# Patient Record
Sex: FEMALE | Race: BLACK OR AFRICAN AMERICAN | NOT HISPANIC OR LATINO | Employment: FULL TIME | URBAN - METROPOLITAN AREA
[De-identification: names, ages, dates, MRNs, and addresses within clinical notes are randomized per-mention and may not be internally consistent; named-entity substitution may affect disease eponyms.]

---

## 2017-03-15 DIAGNOSIS — F41.9 ANXIETY: ICD-10-CM

## 2017-03-15 RX ORDER — CITALOPRAM 10 MG/1
10 TABLET ORAL DAILY
Qty: 30 TABLET | Refills: 0 | Status: SHIPPED | OUTPATIENT
Start: 2017-03-15 | End: 2018-03-01 | Stop reason: SDUPTHER

## 2017-05-10 ENCOUNTER — PATIENT OUTREACH (OUTPATIENT)
Dept: ADMINISTRATIVE | Facility: HOSPITAL | Age: 27
End: 2017-05-10

## 2017-05-10 RX ORDER — DOXYCYCLINE 40 MG/1
CAPSULE ORAL
COMMUNITY
Start: 2017-05-03 | End: 2019-03-28

## 2017-05-10 RX ORDER — DAPSONE 75 MG/G
GEL TOPICAL
COMMUNITY
Start: 2017-05-08 | End: 2019-03-28

## 2017-05-10 RX ORDER — CLINDAMYCIN PHOSPHATE 10 MG/ML
SOLUTION TOPICAL
COMMUNITY
Start: 2017-05-08 | End: 2019-03-28

## 2017-05-10 RX ORDER — IVERMECTIN 10 MG/G
CREAM TOPICAL
COMMUNITY
Start: 2017-05-08 | End: 2019-03-28

## 2017-05-24 RX ORDER — TRETINOIN 0.1 MG/G
GEL TOPICAL
COMMUNITY
Start: 2017-05-09 | End: 2018-01-13

## 2017-06-24 ENCOUNTER — HOSPITAL ENCOUNTER (EMERGENCY)
Facility: OTHER | Age: 27
Discharge: HOME | End: 2017-06-24
Attending: EMERGENCY MEDICINE
Payer: COMMERCIAL

## 2017-06-24 VITALS
SYSTOLIC BLOOD PRESSURE: 129 MMHG | BODY MASS INDEX: 33.32 KG/M2 | RESPIRATION RATE: 17 BRPM | TEMPERATURE: 98 F | HEIGHT: 65 IN | DIASTOLIC BLOOD PRESSURE: 85 MMHG | WEIGHT: 200 LBS | OXYGEN SATURATION: 99 % | HEART RATE: 94 BPM

## 2017-06-24 DIAGNOSIS — M25.511 ACUTE PAIN OF RIGHT SHOULDER: Primary | ICD-10-CM

## 2017-06-24 LAB
B-HCG UR QL: NEGATIVE
CTP QC/QA: YES

## 2017-06-24 PROCEDURE — 25000003 PHARM REV CODE 250: Performed by: PHYSICIAN ASSISTANT

## 2017-06-24 PROCEDURE — 96372 THER/PROPH/DIAG INJ SC/IM: CPT

## 2017-06-24 PROCEDURE — 81025 URINE PREGNANCY TEST: CPT | Performed by: EMERGENCY MEDICINE

## 2017-06-24 PROCEDURE — 63600175 PHARM REV CODE 636 W HCPCS: Performed by: PHYSICIAN ASSISTANT

## 2017-06-24 PROCEDURE — 99283 EMERGENCY DEPT VISIT LOW MDM: CPT | Mod: 25

## 2017-06-24 RX ORDER — METHOCARBAMOL 500 MG/1
500 TABLET, FILM COATED ORAL
Status: COMPLETED | OUTPATIENT
Start: 2017-06-24 | End: 2017-06-24

## 2017-06-24 RX ORDER — METHOCARBAMOL 500 MG/1
1000 TABLET, FILM COATED ORAL 3 TIMES DAILY
Qty: 30 TABLET | Refills: 0 | Status: SHIPPED | OUTPATIENT
Start: 2017-06-24 | End: 2017-06-29

## 2017-06-24 RX ORDER — KETOROLAC TROMETHAMINE 30 MG/ML
30 INJECTION, SOLUTION INTRAMUSCULAR; INTRAVENOUS
Status: COMPLETED | OUTPATIENT
Start: 2017-06-24 | End: 2017-06-24

## 2017-06-24 RX ORDER — IBUPROFEN 800 MG/1
800 TABLET ORAL EVERY 6 HOURS PRN
Qty: 20 TABLET | Refills: 0 | Status: SHIPPED | OUTPATIENT
Start: 2017-06-24 | End: 2018-03-01

## 2017-06-24 RX ADMIN — KETOROLAC TROMETHAMINE 30 MG: 30 INJECTION, SOLUTION INTRAMUSCULAR at 03:06

## 2017-06-24 RX ADMIN — METHOCARBAMOL 500 MG: 500 TABLET ORAL at 03:06

## 2017-06-24 NOTE — ED PROVIDER NOTES
Encounter Date: 6/24/2017       History     Chief Complaint   Patient presents with    Shoulder Pain     right shoulder pain for over a week, pain with any ROM, took Tylenol last night with poor results     Patient is a 26 y.o. female presenting to the emergency department with complaints of right shoulder pain. The patient reports that her symptoms have been persistent for over 1 month. She states she has a baseline, aching pain in her right upper extremity near her shoulder that she rates at a 4/10. She states it worsens with movement to an 8/10. She denies recent injury or trauma. She does admit that her pain started after doing aerial aerobics 1 month ago. She denies numbness, tingling or weakness of her upper extremities. She states she has tried taking tylenol for her symptoms with minimal relief, her last dose was yesterday evening. No medication today thus far.       The history is provided by the patient.     Review of patient's allergies indicates:  No Known Allergies  Past Medical History:   Diagnosis Date    Abnormal Pap smear 1/3/2013    Allergy     Hypertension     Panic attacks      History reviewed. No pertinent surgical history.  Family History   Problem Relation Age of Onset    Hypertension Father     Diabetes Father     Heart attacks under age 50 Father     Hypertension Maternal Grandmother     Hypertension Paternal Grandmother     Hypertension Paternal Grandfather     Diabetes Paternal Grandfather     Heart attack Paternal Grandfather     Breast cancer Neg Hx     Ovarian cancer Neg Hx     Melanoma Neg Hx      Social History   Substance Use Topics    Smoking status: Never Smoker    Smokeless tobacco: Not on file    Alcohol use Yes      Comment: socially     Review of Systems   Constitutional: Negative for activity change, appetite change, chills, fatigue and fever.   HENT: Negative for congestion, rhinorrhea and sore throat.    Eyes: Negative for photophobia and visual  disturbance.   Respiratory: Negative for cough, shortness of breath and wheezing.    Cardiovascular: Negative for chest pain.   Gastrointestinal: Negative for abdominal pain, diarrhea, nausea and vomiting.   Genitourinary: Negative for dysuria, hematuria and urgency.   Musculoskeletal: Positive for arthralgias. Negative for back pain, myalgias and neck pain.   Skin: Negative for color change and wound.   Neurological: Negative for weakness and headaches.   Psychiatric/Behavioral: Negative for agitation and confusion.       Physical Exam     Initial Vitals [06/24/17 1406]   BP Pulse Resp Temp SpO2   129/85 94 17 98.4 °F (36.9 °C) 99 %      MAP       99.67         Physical Exam    Nursing note and vitals reviewed.  Constitutional: Vital signs are normal. She appears well-developed and well-nourished. She is not diaphoretic. She is cooperative.  Non-toxic appearance. She does not have a sickly appearance. She does not appear ill. No distress.   Well appearing, -American female unaccompanied in the emergency department.  She speaking clear and full sentences, smiling on exam.  She is in no acute distress.   HENT:   Head: Normocephalic and atraumatic.   Right Ear: External ear normal.   Left Ear: External ear normal.   Nose: Nose normal.   Mouth/Throat: Oropharynx is clear and moist.   Eyes: Conjunctivae and EOM are normal.   Neck: Normal range of motion. Neck supple.   Cardiovascular: Normal rate, regular rhythm and normal heart sounds.   Pulmonary/Chest: Breath sounds normal. No respiratory distress. She has no wheezes.   Musculoskeletal: Normal range of motion.        Arms:  Tenderness to palpation of the right upper extremity near the anterior aspect of the shoulder and biceps with no palpable deformity, crepitus, step-off.  No overlying skin changes.  Normal strength, sensation.  Normal range of motion, but reported increased pain with abduction.  No edema or erythema of the joint.   Neurological: She is  alert and oriented to person, place, and time. GCS eye subscore is 4. GCS verbal subscore is 5. GCS motor subscore is 6.   Skin: Skin is warm.   Psychiatric: She has a normal mood and affect. Her behavior is normal. Judgment and thought content normal.         ED Course   Procedures  Labs Reviewed   POCT URINE PREGNANCY             Medical Decision Making:   Initial Assessment:   Urgent evaluation of a 26-year-old female presenting to the emergency department with complaints of right shoulder pain ×1 month.  Patient is afebrile, nontoxic appearing, hemodynamically stable, and neurovascularly intact.  Physical exam reveals mild tenderness to palpation of the right upper extremities no palpable deformity, crepitus, step-off.  Normal range of motion, strength, sensation.   Clinical Tests:   Lab Tests: Ordered and Reviewed  The following lab test(s) were unremarkable: UPT  ED Management:  The patient's signs and symptoms are consistent with musculoskeletal etiology.  I do not have a high suspicion for an acute fracture or other acute bony process.  The patient will be administered Toradol and Robaxin the emergency department, we discharged home with a prescription for ibuprofen and Robaxin.  She is counseled on symptomatically care and treatment.  She stable for discharge. The patient was instructed to follow up with a primary care provider in 2 days or to return to the emergency department for worsening symptoms. The treatment plan was discussed with the patient who demonstrated understanding and comfort with plan. The patient's history, physical exam, and plan of care was discussed with and agreed upon with my supervising physician.    Other:   I have discussed this case with another health care provider.       <> Summary of the Discussion: Dr. Hernandez  This note was created using Dragon Medical Dictation. There may be typographical errors secondary to dictation.                    ED Course     Clinical Impression:      1. Acute pain of right shoulder         Disposition:   Disposition: Discharged  Condition: Stable                        Elizabeth Sanchez PA-C  06/24/17 1500

## 2017-09-08 ENCOUNTER — LAB VISIT (OUTPATIENT)
Dept: LAB | Facility: HOSPITAL | Age: 27
End: 2017-09-08
Attending: INTERNAL MEDICINE
Payer: COMMERCIAL

## 2017-09-08 ENCOUNTER — OFFICE VISIT (OUTPATIENT)
Dept: INTERNAL MEDICINE | Facility: CLINIC | Age: 27
End: 2017-09-08
Payer: COMMERCIAL

## 2017-09-08 VITALS
BODY MASS INDEX: 40.84 KG/M2 | SYSTOLIC BLOOD PRESSURE: 118 MMHG | HEART RATE: 105 BPM | RESPIRATION RATE: 16 BRPM | HEIGHT: 65 IN | DIASTOLIC BLOOD PRESSURE: 88 MMHG | TEMPERATURE: 98 F | WEIGHT: 245.13 LBS

## 2017-09-08 DIAGNOSIS — R63.5 WEIGHT GAIN: ICD-10-CM

## 2017-09-08 DIAGNOSIS — E66.01 MORBID (SEVERE) OBESITY DUE TO EXCESS CALORIES: ICD-10-CM

## 2017-09-08 DIAGNOSIS — R63.5 WEIGHT GAIN: Primary | ICD-10-CM

## 2017-09-08 LAB
ALBUMIN SERPL BCP-MCNC: 3.5 G/DL
ALP SERPL-CCNC: 61 U/L
ALT SERPL W/O P-5'-P-CCNC: 16 U/L
ANION GAP SERPL CALC-SCNC: 8 MMOL/L
AST SERPL-CCNC: 20 U/L
BASOPHILS # BLD AUTO: 0.03 K/UL
BASOPHILS NFR BLD: 0.7 %
BILIRUB SERPL-MCNC: 0.3 MG/DL
BUN SERPL-MCNC: 10 MG/DL
CALCIUM SERPL-MCNC: 9 MG/DL
CHLORIDE SERPL-SCNC: 106 MMOL/L
CO2 SERPL-SCNC: 25 MMOL/L
CREAT SERPL-MCNC: 0.8 MG/DL
DIFFERENTIAL METHOD: ABNORMAL
EOSINOPHIL # BLD AUTO: 0.1 K/UL
EOSINOPHIL NFR BLD: 2.9 %
ERYTHROCYTE [DISTWIDTH] IN BLOOD BY AUTOMATED COUNT: 12.7 %
EST. GFR  (AFRICAN AMERICAN): >60 ML/MIN/1.73 M^2
EST. GFR  (NON AFRICAN AMERICAN): >60 ML/MIN/1.73 M^2
ESTIMATED AVG GLUCOSE: 105 MG/DL
GLUCOSE SERPL-MCNC: 93 MG/DL
HBA1C MFR BLD HPLC: 5.3 %
HCT VFR BLD AUTO: 34.4 %
HGB BLD-MCNC: 12 G/DL
LYMPHOCYTES # BLD AUTO: 1.5 K/UL
LYMPHOCYTES NFR BLD: 37.6 %
MCH RBC QN AUTO: 30.1 PG
MCHC RBC AUTO-ENTMCNC: 34.9 G/DL
MCV RBC AUTO: 86 FL
MONOCYTES # BLD AUTO: 0.4 K/UL
MONOCYTES NFR BLD: 9.8 %
NEUTROPHILS # BLD AUTO: 2 K/UL
NEUTROPHILS NFR BLD: 48.8 %
PLATELET # BLD AUTO: 276 K/UL
PMV BLD AUTO: 9.6 FL
POTASSIUM SERPL-SCNC: 4.3 MMOL/L
PROT SERPL-MCNC: 7.1 G/DL
RBC # BLD AUTO: 3.99 M/UL
SODIUM SERPL-SCNC: 139 MMOL/L
TSH SERPL DL<=0.005 MIU/L-ACNC: 1.35 UIU/ML
WBC # BLD AUTO: 4.07 K/UL

## 2017-09-08 PROCEDURE — 99214 OFFICE O/P EST MOD 30 MIN: CPT | Mod: S$GLB,,, | Performed by: INTERNAL MEDICINE

## 2017-09-08 PROCEDURE — 80053 COMPREHEN METABOLIC PANEL: CPT

## 2017-09-08 PROCEDURE — 36415 COLL VENOUS BLD VENIPUNCTURE: CPT | Mod: PO

## 2017-09-08 PROCEDURE — 85025 COMPLETE CBC W/AUTO DIFF WBC: CPT

## 2017-09-08 PROCEDURE — 99999 PR PBB SHADOW E&M-EST. PATIENT-LVL IV: CPT | Mod: PBBFAC,,, | Performed by: INTERNAL MEDICINE

## 2017-09-08 PROCEDURE — 3079F DIAST BP 80-89 MM HG: CPT | Mod: S$GLB,,, | Performed by: INTERNAL MEDICINE

## 2017-09-08 PROCEDURE — 3008F BODY MASS INDEX DOCD: CPT | Mod: S$GLB,,, | Performed by: INTERNAL MEDICINE

## 2017-09-08 PROCEDURE — 3074F SYST BP LT 130 MM HG: CPT | Mod: S$GLB,,, | Performed by: INTERNAL MEDICINE

## 2017-09-08 PROCEDURE — 84443 ASSAY THYROID STIM HORMONE: CPT

## 2017-09-08 PROCEDURE — 83036 HEMOGLOBIN GLYCOSYLATED A1C: CPT

## 2017-09-08 NOTE — PROGRESS NOTES
Subjective:       Patient ID: Carie Sun is a 27 y.o. female.    Chief Complaint: Obesity (unable to lose weight)    HPI     28 yo female here for evaluation of inability to lose weight.  She works out 5 days a week.  She runs in the am and does a bootcamp 3 days a week for an hour.  She has tried kickboxing and cycling classes.  She has changed her eating habits.  She has tried laying off the carbs.  She has tried to stop eating fried food.      Review of Systems   Constitutional: Positive for activity change and unexpected weight change.   HENT: Negative for hearing loss, rhinorrhea and trouble swallowing.    Eyes: Negative for discharge and visual disturbance.   Respiratory: Negative for chest tightness and wheezing.    Cardiovascular: Negative for chest pain and palpitations.   Gastrointestinal: Negative for blood in stool, constipation, diarrhea and vomiting.   Endocrine: Negative for polydipsia and polyuria.   Genitourinary: Negative for difficulty urinating, dysuria, hematuria and menstrual problem.   Musculoskeletal: Negative for arthralgias, joint swelling and neck pain.   Neurological: Negative for weakness and headaches.   Psychiatric/Behavioral: Positive for dysphoric mood. Negative for confusion.       Objective:      Physical Exam   Constitutional: She is oriented to person, place, and time. She appears well-developed and well-nourished.   HENT:   Head: Normocephalic and atraumatic.   Mouth/Throat: No oropharyngeal exudate.   Eyes: EOM are normal. Pupils are equal, round, and reactive to light. Right eye exhibits no discharge. Left eye exhibits no discharge. No scleral icterus.   Neck: Normal range of motion. Neck supple. No tracheal deviation present. No thyromegaly present.   Cardiovascular: Normal rate, regular rhythm and normal heart sounds.  Exam reveals no gallop and no friction rub.    No murmur heard.  Pulmonary/Chest: Effort normal and breath sounds normal. No respiratory distress.  She has no wheezes. She has no rales. She exhibits no tenderness.   Abdominal: Soft. Bowel sounds are normal. She exhibits no distension and no mass. There is no tenderness. There is no rebound and no guarding.   Musculoskeletal: Normal range of motion. She exhibits no edema or tenderness.   Neurological: She is alert and oriented to person, place, and time.   Skin: Skin is warm and dry. No rash noted. No erythema. No pallor.   Psychiatric: She has a normal mood and affect. Her behavior is normal.   Vitals reviewed.      Assessment:       1. Weight gain    2. Morbid (severe) obesity due to excess calories        Plan:       1/2.  Med fit.  Check CBC, CMP, TSH, A1c.  Refer to bariatric medicine.

## 2017-11-22 ENCOUNTER — TELEPHONE (OUTPATIENT)
Dept: BARIATRICS | Facility: CLINIC | Age: 27
End: 2017-11-22

## 2017-11-27 ENCOUNTER — INITIAL CONSULT (OUTPATIENT)
Dept: BARIATRICS | Facility: CLINIC | Age: 27
End: 2017-11-27
Payer: COMMERCIAL

## 2017-11-27 VITALS
HEART RATE: 90 BPM | HEIGHT: 65 IN | SYSTOLIC BLOOD PRESSURE: 104 MMHG | BODY MASS INDEX: 39.67 KG/M2 | WEIGHT: 238.13 LBS | DIASTOLIC BLOOD PRESSURE: 70 MMHG

## 2017-11-27 DIAGNOSIS — E66.01 SEVERE OBESITY (BMI 35.0-39.9): Primary | ICD-10-CM

## 2017-11-27 PROCEDURE — 99999 PR PBB SHADOW E&M-EST. PATIENT-LVL III: CPT | Mod: PBBFAC,,, | Performed by: INTERNAL MEDICINE

## 2017-11-27 PROCEDURE — 99244 OFF/OP CNSLTJ NEW/EST MOD 40: CPT | Mod: S$GLB,,, | Performed by: INTERNAL MEDICINE

## 2017-11-27 RX ORDER — PHENTERMINE HYDROCHLORIDE 37.5 MG/1
37.5 TABLET ORAL
Qty: 30 TABLET | Refills: 2 | Status: SHIPPED | OUTPATIENT
Start: 2017-11-27 | End: 2017-12-27

## 2017-11-27 NOTE — LETTER
[facility]  [address]  [facility] [address]  Phone: [phone]  Fax: [phone] November 30, 2017      Mal Martinez MD  [address] [facility] [place] [address]    Patient: Carie Sun   MR Number: [id number]   YOB: 1990   Date of Visit: 11/27/2017     Dear Dr. Martinez:    Thank you for referring Carie Sun to me for evaluation. Below are the relevant portions of my assessment and plan of care.    ASSESSMENT:  1. Severe obesity (BMI 35.0-39.9)      PLAN:  1. Severe obesity (BMI 35.0-39.9) - Meal ideas, healthy all day and holiday tips given. Needs to be more consistent in her habits.     - Phentermine (ADIPEX-P) 37.5 mg tablet; Take 1 tablet (37.5 mg total) by mouth before breakfast.  Dispense: 30 tablet; Refill: 2    Patient warned of common side effects of phentermine including anxiety, insomnia, palpitations and increased blood pressure. It was also explained that it is for short-term usage along with diet and exercise, and that stopping the medication without making lifestyle changes will result in regain of weight. Patient states understanding.      Weight loss medications are controlled substances.  They require routine follow up. Prescription or pills that are lost or destroyed will not be replaced.      Start Phentermine with 1/2 pill a day to see if that will control your appetite.  Go up to a full pill when needed.     The patient was given individualized diet, exercise, and follow-up instructions.    sweet tea, juices or lemonade     [email or web address] for recipes. Moderate carb intake    If you have questions, please do not hesitate to call me. I look forward to following Carie along with you.    Sincerely,      Samantha Lutz MD   Medical Weight Loss   [facility]     [email or web address]

## 2017-11-27 NOTE — LETTER
November 30, 2017      Mal Martinez MD  [address] [facility] [place] [address]           [facility]  [address]  [place] [address]  Phone: [phone]  Fax: [phone]          Patient: Carie Sun   MR Number: [id number]   YOB: 1990   Date of Visit: 11/27/2017       Dear Dr. Mal Martinez:    Thank you for referring Carie Sun to me for evaluation. Attached you will find relevant portions of my assessment and plan of care.    If you have questions, please do not hesitate to call me. I look forward to following Carie Sun along with you.    Sincerely,    Samantha Lutz MD    Enclosure  CC:  No Recipients    If you would like to receive this communication electronically, please contact [email or web address] or [phone] to request more information on [facility] Link access.    For providers and/or their staff who would like to refer a patient to [facility], please contact us through our one-stop-shop provider referral line, [facility], at [phone].    If you feel you have received this communication in error or would no longer like to receive these types of communications, please e-mail [email or web address]

## 2017-11-27 NOTE — PATIENT INSTRUCTIONS
Patient warned of common side effects of phentermine including anxiety, insomnia, palpitations and increased blood pressure. It was also explained that it is for short-term usage along with diet and exercise, and that stopping the medication without making lifestyle changes will result in regain of weight. Patient states understanding.     Weight loss medications are controlled substances.  They require routine follow up. Prescription or pills that are lost or destroyed will not be replaced.     Start phentermine with 1/2 pill a day to see if that will control your appetite.  Go up to a full pill when needed.       3 meals a day made up of the following:  Unlimited green vegetables, tomatoes, mushrooms, spaghetti squash, cauliflower, meat, poultry, seafood, eggs and hard cheeses.   Milk and plain yogurt  Dressings, seasonings, condiments, etc should have less than 2 g sugars.   beans or nuts can have 1 x a day.   1-2 servings of citrus fruits, berries, pineapple or melon a day (1/2 cup)  Avoid fried foods    No grains, rice, pasta, potatoes, bread, corn, peas, oatmeal, grits, tortillas, crackers, chips    No soda, sweet tea, juices or lemonade    [email or web address] for recipes. Moderate carb intake    Meal Ideas for Regular Bariatric Diet  *Recipes and products available at [email or web address].com      Breakfast: (15-20g protein)    - Egg white omelet: 2 egg whites or ½ cup Egg Beaters. (Optional proteins: cheese, shrimp, black beans, chicken, sliced turkey) (Optional veggies: tomatoes, salsa, spinach, mushrooms, onions, green peppers, or small slice avocado)     - Egg and sausage: 1 egg or ¼ cup Egg Beaters (any variety), with 1 josé or 2 links of Turkey sausage or Veggie breakfast sausage (V[employer]ortex or [facility])    - Crust-less breakfast quiche: To make a glass pie dish, mix 4oz part skim Ricotta, 1 cup skim milk, and 2 eggs as your base. Add protein: shredded cheese, sliced lean ham or turkey, turkey  orlando/sausage. Add veggies: tomato, onion, green onion, mushroom, green pepper, spinach, etc.    - Yogurt parfait: Mix 1 - 6oz container Dannon Light N Fit vanilla yogurt, with ¼ cup Kashi Go Lean cereal    - Cottage cheese and fruit: ½ cup part-skim cottage cheese or ricotta cheese topped with fresh fruit or sugar free preserves     - Cari Mack's Vanilla Egg custard* (add 2 Tbsp instant coffee granules to make Cappuccino Custard*)    - Hi-Protein café latte (skim milk, decaf coffee, 1 scoop protein powder). Optional to add Sugar free syrup or extract flavoring.    Lunch: (20-30g protein)    - ½ cup Black bean soup (Homemade or Progresso), with ¼ cup shredded low-fat cheese. Top with chopped tomato or fresh salsa.     - Lean deli turkey breast and low-fat sliced cheese, mustard or light moctezuma to moisten, rolled up together, or wrapped in a Tre lettuce leaf    - Chicken salad made from dinner leftovers, moisten with low-fat salad dressing or light moctezuma. Also try leftover salmon, shrimp, tuna or boiled eggs. Serve ½ cup over dark green salad    - Fat-free canned refried beans, topped with ¼ cup shredded low-fat cheese. Top with chopped tomato or fresh salsa.     - Greek salad: Top mixed greens with 1-2oz grilled chicken, tomatoes, red onions, 2-3 kalamata olives, and sprinkle lightly with feta cheese. Spritz with Balsamic vinegar to taste.     - Crust-less lunch quiche: To make a glass pie dish, mix 4oz part skim Ricotta, 1 cup skim milk, and 2 eggs as your base. Add protein: shredded cheese, sliced lean ham or turkey, shrimp, chicken. Add veggies: tomato, onion, green onion, mushroom, green pepper, spinach, artichoke, broccoli, etc.    - Pizza bake: tomato sauce, low-fat shredded mozzarella and turkey pepperoni or [place] or[place]. Add any veggies.    - Cucumber crab bites: Spread ¼ cup crab dip (lump crabmeat + light cream cheese and green onions) over sliced cucumber.     - Chicken with light spinach and  artichoke dip*: Puree in : 6oz cooked and drained spinach, 2 cloves garlic, 1 can cannelloni beans, ½ cup chopped green onions, 1 can drained artichoke hearts (not marinated in oil), lemon juice and basil. Mix in 2oz chopped up chicken.    Supper: (20-30g protein)    - Serve grilled fish over dark green salad tossed with low-fat dressing, served with grilled asparagus sanders     - Rotisserie chicken salad: served with sliced strawberries, walnuts, fat-free feta cheese crumbles and 1 tbsp Apples Own Light Raspberry Hinkle Vinaigrette    - Shrimp cocktail: Dip cold boiled shrimp in homemade low-sugar cocktail sauce (1/2 cup Mckenna One Carb ketchup, 2 tbsp horseradish, 1/4 tsp hot sauce, 1 tsp Worcestershire sauce, 1 tbsp freshly-squeezed lemon juice). Serve with dark green salad, walnuts, and crumbled blue cheese drizzled with olive oil and Balsamic vinegar    - Tuna Melt: Spread tuna salad onto 2 thick slices of tomato. Top with low-fat cheese and broil until cheese is melted. May also be made with chicken salad of shrimp salad. [place] with different types of cheeses.    - Homemade low-fat Chili using extra lean ground beef or ground turkey. Top with shredded cheese and salsa as desired. May add dollop fat-free sour cream if desired    - Dinner Omelet with shrimp or chicken and onion, green peppers and chives.    - No noodle lasagna: Use sliced zucchini or eggplant in place of noodles.  Layer with part skim ricotta cheese and low sugar meat sauce (use very lean ground beef or ground turkey).    - Mexican chicken bake: Bake chunks of chicken breast or thigh with taco seasoning, Pace brand enchilada sauce, green onions and low-fat cheese. Serve with ¼ cup black beans or fat free refried beans topped with chopped tomatoes or salsa.    - Los frozen meatballs, simmered in Classico Marinara sauce. Different flavors of salsa or spaghetti sauce create different dishes! Sprinkle with parmesan cheese.  Serve with grilled or steamed veggies, or a dark green salad.    - Simmer boneless skinless chicken thigh chunks in Classico Marinara sauce or roasted salsa until tender with chopped onion, bell pepper, garlic, mushrooms, spinach, etc.     - Hamburger, without the bun, dressed the way you like. Served with grilled or steamed veggies.    - Eggplant parmesan: Bake slices of eggplant at 350 degrees for 15 minutes. Layer tomato sauce, sliced eggplant and low-fat mozzarella cheese in a baking dish and cover with foil. Bake 30-40 more minutes or until bubbly. Uncover and bake at 400 degrees for about 15 more minutes, or until top is slightly crisp.    - Fish tacos: grilled/baked white fish, wrapped in Tre lettuce leaf, topped with salsa, shredded low-fat cheese, and light coleslaw.    Snacks: (100-200 calories; >5g protein)    - 1 low-fat cheese stick with 8 cherry tomatoes or 1 serving fresh fruit  - 4 thin slices fat-free turkey breast and 1 slice low-fat cheese  - 4 thin slices fat-free honey ham with wedge of melon  - 1/4 cup unsalted nuts with ½ cup fruit  - 6-oz container Dannon Light n Fit vanilla yogurt, topped with 1oz unsalted nuts         - apple, celery or baby carrots spread with 2 Tbsp natural peanut butter or almond butter   - apple slices with 1 oz slice low-fat cheese  - celery, cucumber, bell pepper or baby carrots dipped in ¼ cup hummus bean spread or light spinach and artichoke dip (*recipe in lunch section)  - 100 calorie bag microwave light popcorn with 3 tbsp grated parmesan cheese  - Du Links Beef Steak - 14g protein! (similar to beef jerky)  - 2 wedges [place] - Light Herb & Garlic Cheese with sliced cucumber or green bell pepper  - 1/2 cup low-fat cottage cheese with ¼ cup fruit or ¼ cup salsa  - RTD Protein drinks: Atkins, Low Carb Slim Fast, EAS light, Muscle Milk Light, etc.  - Homemade Protein drinks: GNC Soy95, Isopure, Nectar, UNJURY, Whey Gourmet, etc. Mix 1 scoop powder with  8oz skim/1% milk or light soymilk.  - Protein bars: Atkins, EAS, Pure Protein, Think Thin, Detour, etc. Must have 0-4 grams sugar - Read the label.    Takeout Options: No more than twice/week  Deli - Salads (no pasta or rice), meats, cheeses. Roasted chicken. Lox (salmon)    Mexican - Platters which don't include tortillas, chips, or rice. Go easy on the beans. Example: Fajitas without the tortillas. Ask the  not to bring chips to the table if they are too tempting.    Greek - Meat or fish and vegetable, but no bread or rice. Including hummus, baba ganoush, etc, is OK. Most sit-down Greek restaurants can provide you with cucumber slices for dipping instead of elpidio bread.    Fast Food (Avoid as much as possible) - Salads (no croutons and limit salad dressing to 2 tbsp), grilled chicken sandwich without the bun and ask for no moctezuma. Mells low fat chili or Taco Bell pintos and cheese.    BBQ - The meats are fine if you ask for sauces on the side, but most of the traditional side dishes are loaded with carbs. Neal slaw, baked beans and BBQ sauce are typically made with sugar.    Chinese - Nothing deep-fried, no rice or noodles. Many Chinese sauces have starch and sugar in them, so you'll have to use your judgement. If you find that these sauces trigger cravings, or cause Dumping, you can ask for the sauce to be made without sugar or just use soy sauce.      Eating well to be healthy and lose weight does not have to be hard. It also does not have to be time consuming or expensive. There a lots of ways you can work in healthy choices into your day. Many of these are easy, quick and even family friendly!    Homemade hazelnut au lait  Brew your favorite brand of hazelnut flavored coffee (Community makes a good one). Microwave 1/2 cup of milk that fits your eating plan (whole, skim or sugar-free almond milk can all work). Add half to 1 oz sugar free hazelnut syrup.     Quick and easy breakfast  1-2 boiled eggs or  mini-frittatas with a tangerine. The boiled eggs and mini-frittatas can both be made ahead and last for up to 4 days in the refrigerator. Bonus if you portion them out in ready to go containers or zipper bags.     Breakfast Egg Muffins with [place] and Spinach  Makes 12 muffins  Ingredients    6 eggs  ¼ cup milk  ¼ teaspoon salt  2 cups grated cheddar cheese  3/4 cup spinach, cooked and drained (about 8 oz fresh spinach)  6 [place] slices, cooked, drained of fat, and chopped  1/2 cup grated Parmesan cheese (optional)    Instructions      Preheat oven to 350 degrees. Use a regular 12-cup muffin pan. Spray the muffin pan with non-stick cooking spray.  In a large bowl, beat eggs until smooth. Add milk, salt, Cheddar cheese and mix. Stir spinach, cooked or[place] into the egg mixture. Ladle the egg mixture into greased muffin cups ¾ full.  Top each muffin cup with grated Parmesan cheese.  Bake for 25 minutes. Remove from the oven, let the muffins cool for 30 minutes before removing them from the pan.      Be a brown bagger! When you make dinner, plan for an extra helping. When you serve your plate for dinner, serve an additional helping into a container that you can take with you the next day. If you don't have a refrigerator available during the day, an insulated lunch bag and ice packs will help you safely store you lunch.     Cold Brewed Iced tea. Fill a pitcher with 64 oz filtered water. Add either 4 regular tea bags of your choice or a large iced tea bag. Refrigerate over night then remove the tea bags. The tea will not be bitter and is super flavorful. Get creative! Try combinations like green tea and hibiscus tea or black tea with lemon zinger. Add orange or lemon slices for even more flavor.     Snack wisely. Protein filled snacks will fill you up, allowing you to get by with fewer calories. String cheese, pork skins (chicharrones), turkey pepperoni, or celery with cream cheese will all fit the bill.       Ditch the  take out. Turkey tacos (with or without a low carb tortilla), burgers (without the bun), or fun stir fries are all quick and easy. The whole family will be happy, and you can save calories and money.      Orange Chicken Stir doe with asparagus   Makes 6 servings  Ingredients:    1.5 lbs boneless skinless chicken breast/tenders, diced into 1-inch pieces  1 Tbsp extra virgin olive or avocado oil, divided  2 lb asparagus, end portions trimmed and remainder diced into 1 1/2-inch pieces  1 small yellow onion, sliced into thin strips  8 oz button mushrooms, sliced  1 Tbsp peeled and finely grated fresh oanh  4 cloves garlic, minced  1/2 cup low-sodium chicken broth  Juice of 2 fresh oranges  2 Tbsp low sodium soy sauce  2 Tbsp cornstarch  Sea salt and freshly ground black pepper    Directions:    In a 12-inch non-stick wok, heat 1/2 oil over moderately high heat. Once oil is hot, add diced chicken and season lightly with salt and pepper. Sauté until cooked through, tossing occasionally, about 5-6 minutes.  Place chicken on a large plate and set aside. Return wok, reduce to medium-high heat, add remaining oil.  Once oil is hot, add asparagus, yellow onion and mushrooms, and sauté until tender-crisp, about 4 - 5 minutes, adding in garlic and oanh during the last 1 minute of sautéing.  Meanwhile, in a mixing bowl whisk together chicken broth, orange juice, soy sauce and cornstarch until well blended.  Pour chicken broth mixture into skillet with veggies, season with salt and pepper to taste, and bring mixture to a light boil, stirring constantly. Allow mixture to gently boil, stirring constantly, until thickened, about 1 minute.  Toss chicken into mixture and serve immediately over cauliflower rice or Shirataki noodles.      Skinny Chicken Tortilla Soup  Makes 7 servings    2 teaspoons olive oil  1 cup onion, chopped (about 1 small)  2 cups celery, sliced (about 4 medium stalks)  4 garlic cloves, minced  4 medium  tomatoes, chopped  2 cups water  4 cups low-sodium organic chicken broth  3 cups chopped and/or shredded rotisserie chicken, skinless  2 cups sliced carrots (about 3 medium)  1 teaspoon dried oregano leaves  2 teaspoons chili powder  1 teaspoon garlic powder  2 teaspoons cumin  ½ teaspoon cayenne pepper (add less or omit, if you don't want a spicy soup)  ½ teaspoon sea salt + more to taste  ½ teaspoon pepper + more to taste    Directions:   Put all ingredients into a large crock pot. Cook on low for 5-6 hours.     Optional garnish with chopped avocado, chopped fresh cilantro, crumbled Cotija cheese, sour cream, Greek yogurt, your favorite hot sauce.           Vegan Avocado Banana Chocolate Pudding  Makes 4 servings  Ingredients    1 1/2 ripe avocados  2 ripe bananas  6 tbsp raw cacao powder or unsweetened cocoa powder  2-3 tbsp maple syrup (or calorie free sweetener)  1/4 cup almond milk  Instructions    Blend everything together in a  until the consistency is smooth and velvety. Taste and see if more sweetener is needed and stir to make sure everything is evenly mixed. Blend a second time if needed.  Top with banana slices, raw cacao nibs, almond butter, or any other toppings and enjoy!  Helpful tips to survive the holidays:  - Dont save yourself for the meal: Make sure you continue to eat high protein small meals every 3-4 hours to ensure to do not become over-hungry. Avoid temptation by not showing up to a holiday party or gathering hungry.   - Plan ahead. Bring a dish to a party if you know there may not be an appropriate option.   - Choose sugar-free drinks: Stick to water or other sugar-free beverages and make sure you are getting 6-8 cups of fluid each day (but not with meals!). Avoid alcohol, carbonated beverages, and high-fat/high-sugar beverages like hot chocolate and eggnog. Try sugar-free hot cocoa made with skim milk or water, or sugar-free spiced tea to add some holiday flair to your  beverage (see sugar-free mulled cider recipe below)  - Take your time: Eat mindfully. Dont graze on food throughout the day. Sit down to enjoy your small meals. Chew slowly and thoroughly. Cut your food into small pieces before eating.  - Listen to your body: Stop eating as soon as you feel full. Do not feel pressured to try certain (or all) foods or to eat all of the food on your plate. Listen to your hunger cues.   - REMEMBER: Make your holidays about spending time with family and friends instead of focusing gatherings around food.  - Keep up your exercise routine: Make sure you continue to get regular exercise throughout the holiday season. Encourage friends and family to be active by taking a walk together after a meal, to look at holiday lights, or to window-shop.    Good Holiday Meal Options:  - Roasted Turkey, NO skin. Use low sodium broth instead of gravy.   - Stuffed Bell Peppers made WITHOUT bread crumbs or Rice. Try using parmesan cheese instead  - Gumbo, NO rice. Try picking out mostly the meat/seafood and vegetables with little broth.   - Green Bean Casserole made with 98% fat free cream of mushroom soup and crushed almonds/pecans instead of fried onions  - Side salad w/ low fat dressing. Try a different kind of salad maybe use Kale or spinach.   - Roasted non-starchy vegetables like brussel sprouts, broccoli, green beans, zucchini, butternut squash, cauliflower  - Cauliflower Mash (steam or roast cauliflower, puree w/ low fat cheese, dash of fat free milk and 2-3 sprays of I cant believe its not butter spray. Add garlic powder and black pepper to season). Use Low sodium broth instead of gravy.   - Try Loaded Cauliflower Mash (Make cauliflower like above cauliflower mash. Top with diced turkey orlando, ¼ cup low fat cheddar cheese and bake @ 350* F for 5-10 minutes, until cheese is melted. Top with minced chives, black pepper and garlic to taste).   - Homemade cranberry sauce using Splenda or another  alternative sweetener. Boil fresh cranberries and add splenda to taste. Boil until cranberries break open and then simmer until it reaches the consistency you want (less time for more watery sauce and simmer for longer to create a thicker sauce).   - Deviled eggs: make using low fat moctezuma, mustard, DILL relish (not sweet relish).   - Vegetable tray w/ Greek yogurt Ranch Dip. Mix 1 packet of [place] ranch dip mix w/ 16 oz low fat plain greek yogurt.     Good Holiday Dessert Options:  - High protein Pumpkin Cheesecake (see recipe below)  - Pumpkin Whip (see recipe below)  - Quest Apple Pie or Cinnamon Roll flavored protein bar (warm in microwave for 10-15 seconds)  - Eggnog Protein shake (see recipe below)  - Fresh fruit w/ low fat cheese  - Sugar-free Jello Parfaits. Layer Red and Green sugar-free jello in cups and top w/ 2 tbsp Sugar-free cool-whip    Pumpkin Cheesecake    8 ounces fat free cream cheese, softened   2 scoops of vanilla protein powder (<4 g sugar per serving)   ¼ tsp Fine salt   2 eggs, at room temperature   1/3 cup fat free sour cream  1/3 cup fat free half and half  1 15 -ounce can pure pumpkin puree   1 tablespoon pumpkin pie spice, plus more for dusting   Unsalted nuts, crushed  *Add splenda to taste    Directions     1. Preheat the oven to 300 degrees F. Line 18 muffin cups with paper liners. Sprinkle 1 tsp crushed unsalted nuts at the bottom of each of muffin cup liner.     2. In a large bowl, beat the cream cheese, vanilla protein powder and 1/4 teaspoon fine salt on medium-high speed until smooth and creamy, 2 to 3 minutes. Scrape down the sides, reduce speed to low and beat in the eggs, 1 at a time, until combined. Beat in 1/3 cup fat free sour cream and fat free half and half. Stir in the pumpkin puree and pumpkin pie spice until smooth. Divide evenly among cookie-lined paper cups, filling almost all the way to the top.     3. Bake until the filling is just set, 40 to 45 minutes. A  sharp knife inserted into the center will come out moist, but clean. Cool completely in tins on a wire rack. Refrigerate until cold, 4 hours, or overnight. Top with a dusting of pumpkin pie spice.    Recipe altered from the following recipe: [email or web address].Optimal Blue/recipes/food-network-sheela/mini-pumpkin-cheesecakes-recipe.print.html?oc=linkback    Pumpkin Whip    Box of sugar-free vanilla pudding  Can of pumpkin puree  Pumpkin Pie spice (sprinkle to taste)  ½ cup of sugar-free Cool Whip    Directions:  Make sugar-free pudding according to package directions using fat free or 1% milk. Stir in pumpkin and cool whip. Add pumpkin pie spice to taste.     Egg Nog Protein shake    8 oz skim or 1% milk  1 scoop vanilla protein powder  1 tbsp sugar-free vanilla pudding mix  ½ tsp butter flavor extract  ½ tsp rum extract  ½ tsp cinnamon     Shake together or blend with ice and serve.     Sugar-Free Mulled Cider    3 oz diet cran-apple juice  6 oz water  1 packet sugar-free apple cider mix  ½ tsp apple pie spice  ½ tsp butter flavor extract  1 tbsp Sugar-free Syrup    Mix together. Warm if needed and serve w/ orange wedge and cinnamon stick.

## 2017-11-27 NOTE — PROGRESS NOTES
Subjective:       Patient ID: Carie Sun is a 27 y.o. female.    Chief Complaint: No chief complaint on file.    CC: I gained a lot of weight 2 years ago    Current attempts at weight loss: New pt to me, referred by Mal Martinez MD  [address] [facility]  [place] [id number] , with Patient Active Problem List:     Abnormal Pap smear     Adjustment disorder with mixed anxiety and depressed mood       6 days a week jogging and lifting weights. Eating better. Has gained 35 lbs in 2 years. On Celexa for 3 years.     Previous diet attempts: Denies    History of medication for loss: Denies.   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   Breakfast: Cheerios, kix, Cap-N crunch. Yogurt and fruit. Weekends: same.     Lunch: salad with boiled eggs and fruit. School lunch. Weekends: shrimp scampi, BBq shrmip or crab cakes.     Dinner: salmon with veg. Weekends: Grandma's house- fried fish, pasta, macaroni, bread.     Snacks: fruit, veg, mixed nuts, boiled egg,     Beverages: water, juice, coffee 2 times a week. ETOH- 2-3 times a week- wine, beer or mixed drinks.     Willingness to change: 8/10    EKG:Sinus tachycardia  Possible Left atrial enlargement  Borderline Abnormal ECG    BMR: 1821        Review of Systems   Constitutional: Negative for chills and fever.   Respiratory: Negative for shortness of breath.         + snores.    Cardiovascular: Negative for chest pain and leg swelling.   Gastrointestinal: Negative for constipation and diarrhea.        Denies GERD   Genitourinary: Negative for difficulty urinating and menstrual problem.   Musculoskeletal: Positive for arthralgias. Negative for back pain.        Ankle   Neurological: Negative for dizziness and light-headedness.   Psychiatric/Behavioral: Negative for dysphoric mood. The patient is nervous/anxious.        Objective:      "/70   Pulse 90   Ht 5' 5" (1.651 m)   Wt 108 kg (238 lb 1.6 oz)   LMP 11/22/2017 (Approximate)   BMI 39.62 kg/m²     Physical Exam   Constitutional: She is oriented to person, place, and time. She appears well-developed. No distress.   Obese   HENT:   Head: Normocephalic and atraumatic.   Eyes: EOM are normal. Pupils are equal, round, and reactive to light. No scleral icterus.   Neck: Normal range of motion. Neck supple. No thyromegaly present.   Cardiovascular: Normal rate and normal heart sounds.  Exam reveals no gallop and no friction rub.    No murmur heard.  Pulmonary/Chest: Effort normal and breath sounds normal. No respiratory distress. She has no wheezes.   Abdominal: Soft. Bowel sounds are normal. She exhibits no distension. There is no tenderness.   Musculoskeletal: Normal range of motion. She exhibits no edema.   Neurological: She is alert and oriented to person, place, and time. No cranial nerve deficit.   Skin: Skin is warm and dry. No erythema.   Psychiatric: She has a normal mood and affect. Her behavior is normal. Judgment normal.   Vitals reviewed.      Assessment:       1. Severe obesity (BMI 35.0-39.9)        Plan:         1. Severe obesity (BMI 35.0-39.9)  Meal ideas, healthy all day and holiday tips given. Needs to be more consistent in her habits.   - phentermine (ADIPEX-P) 37.5 mg tablet; Take 1 tablet (37.5 mg total) by mouth before breakfast.  Dispense: 30 tablet; Refill: 2  Patient warned of common side effects of phentermine including anxiety, insomnia, palpitations and increased blood pressure. It was also explained that it is for short-term usage along with diet and exercise, and that stopping the medication without making lifestyle changes will result in regain of weight. Patient states understanding.     Weight loss medications are controlled substances.  They require routine follow up. Prescription or pills that are lost or destroyed will not be replaced.     Start " phentermine with 1/2 pill a day to see if that will control your appetite.  Go up to a full pill when needed.     3 meals a day made up of the following:  Unlimited green vegetables, tomatoes, mushrooms, spaghetti squash, cauliflower, meat, poultry, seafood, eggs and hard cheeses.   Milk and plain yogurt  Dressings, seasonings, condiments, etc should have less than 2 g sugars.   beans or nuts can have 1 x a day.   1-2 servings of citrus fruits, berries, pineapple or melon a day (1/2 cup)  Avoid fried foods    No grains, rice, pasta, potatoes, bread, corn, peas, oatmeal, grits, tortillas, crackers, chips    No soda, sweet tea, juices or lemonade    [email or web address] Interpreting for recipes. Moderate carb intake

## 2018-01-05 ENCOUNTER — PATIENT MESSAGE (OUTPATIENT)
Dept: OBSTETRICS AND GYNECOLOGY | Facility: CLINIC | Age: 28
End: 2018-01-05

## 2018-01-05 ENCOUNTER — LAB VISIT (OUTPATIENT)
Dept: LAB | Facility: OTHER | Age: 28
End: 2018-01-05
Payer: COMMERCIAL

## 2018-01-05 ENCOUNTER — OFFICE VISIT (OUTPATIENT)
Dept: OBSTETRICS AND GYNECOLOGY | Facility: CLINIC | Age: 28
End: 2018-01-05
Payer: COMMERCIAL

## 2018-01-05 ENCOUNTER — TELEPHONE (OUTPATIENT)
Dept: OBSTETRICS AND GYNECOLOGY | Facility: CLINIC | Age: 28
End: 2018-01-05

## 2018-01-05 VITALS
SYSTOLIC BLOOD PRESSURE: 138 MMHG | DIASTOLIC BLOOD PRESSURE: 72 MMHG | HEIGHT: 65 IN | WEIGHT: 242.38 LBS | BODY MASS INDEX: 40.38 KG/M2

## 2018-01-05 DIAGNOSIS — N76.0 ACUTE VAGINITIS: Primary | ICD-10-CM

## 2018-01-05 DIAGNOSIS — Z20.2 POSSIBLE EXPOSURE TO STD: ICD-10-CM

## 2018-01-05 DIAGNOSIS — B96.89 BV (BACTERIAL VAGINOSIS): Primary | ICD-10-CM

## 2018-01-05 DIAGNOSIS — N76.0 BV (BACTERIAL VAGINOSIS): Primary | ICD-10-CM

## 2018-01-05 LAB
CANDIDA RRNA VAG QL PROBE: NEGATIVE
G VAGINALIS RRNA GENITAL QL PROBE: POSITIVE
T VAGINALIS RRNA GENITAL QL PROBE: NEGATIVE

## 2018-01-05 PROCEDURE — 86592 SYPHILIS TEST NON-TREP QUAL: CPT

## 2018-01-05 PROCEDURE — 86803 HEPATITIS C AB TEST: CPT

## 2018-01-05 PROCEDURE — 87340 HEPATITIS B SURFACE AG IA: CPT

## 2018-01-05 PROCEDURE — 99999 PR PBB SHADOW E&M-EST. PATIENT-LVL III: CPT | Mod: PBBFAC,,,

## 2018-01-05 PROCEDURE — 36415 COLL VENOUS BLD VENIPUNCTURE: CPT

## 2018-01-05 PROCEDURE — 87491 CHLMYD TRACH DNA AMP PROBE: CPT

## 2018-01-05 PROCEDURE — 86703 HIV-1/HIV-2 1 RESULT ANTBDY: CPT

## 2018-01-05 PROCEDURE — 99214 OFFICE O/P EST MOD 30 MIN: CPT | Mod: S$GLB,,, | Performed by: OBSTETRICS & GYNECOLOGY

## 2018-01-05 PROCEDURE — 87480 CANDIDA DNA DIR PROBE: CPT

## 2018-01-05 RX ORDER — ALBUTEROL SULFATE 90 UG/1
AEROSOL, METERED RESPIRATORY (INHALATION)
Refills: 0 | COMMUNITY
Start: 2017-12-28 | End: 2019-03-28

## 2018-01-05 RX ORDER — METRONIDAZOLE 500 MG/1
500 TABLET ORAL EVERY 12 HOURS
Qty: 14 TABLET | Refills: 0 | Status: SHIPPED | OUTPATIENT
Start: 2018-01-05 | End: 2018-01-12

## 2018-01-05 NOTE — TELEPHONE ENCOUNTER
----- Message from Radames Rogel sent at 1/5/2018 10:17 AM CST -----  Contact: Pt   Pt would like a call back regarding scheduling same day appointment for possible yeast infection        Pt can be reached at [phone]

## 2018-01-05 NOTE — TELEPHONE ENCOUNTER
Made an appointment for the patient to come to the walk-in clinic. She voiced understanding and will be here at 1pm.

## 2018-01-05 NOTE — PROGRESS NOTES
Carie Sun is a 27 y.o. female  p[facility] with complaint of vaginal discharge for 2-3 days.  She reports itching, and denies odor.  She states the discharge is white.  Pt also desires STD check    Pt sees Dr. estevez for her OB/GYN care.    ROS:  GENERAL: No fever, chills, fatigability or weight loss.  VULVAR: No pain, no lesions and no itching.  VAGINAL: No relaxation, no abnormal bleeding and no lesions. Reports itching and discharge  ABDOMEN: No abdominal pain. Denies nausea. Denies vomiting. No diarrhea. No constipation  BREAST: Denies pain. No lumps. No discharge.  URINARY: No incontinence, no nocturia, no frequency and no dysuria.  CARDIOVASCULAR: No chest pain. No shortness of breath. No leg cramps.  NEUROLOGICAL: No headaches. No vision changes.      Review of patient's allergies indicates:  No Known Allergies    Current Outpatient Prescriptions:     ACZONE 7.5 % GlwP, , Disp: , Rfl:     citalopram (CELEXA) 10 MG tablet, Take 1 tablet (10 mg total) by mouth once daily., Disp: 30 tablet, Rfl: 0    clindamycin (CLEOCIN T) 1 % Swab, , Disp: , Rfl:     doxycycline (ORACEA) 40 mg capsule, , Disp: , Rfl:     ibuprofen (ADVIL,MOTRIN) 600 MG tablet, Take 1 tablet (600 mg total) by mouth every 6 (six) hours as needed for Pain., Disp: 20 tablet, Rfl: 0    ibuprofen (ADVIL,MOTRIN) 800 MG tablet, Take 1 tablet (800 mg total) by mouth every 6 (six) hours as needed for Pain., Disp: 20 tablet, Rfl: 0    PROAIR HFA 90 mcg/actuation inhaler, INHALE 2 PUFF EVERY 4 TO 6 HRS AS NEEDED, Disp: , Rfl: 0    SOOLANTRA 1 % Crea, , Disp: , Rfl:     tretinoin (RETIN-A) 0.01 % gel, , Disp: , Rfl:     Past Medical History:   Diagnosis Date    Abnormal Pap smear 1/3/2013    Allergy     Hypertension     Panic attacks      History reviewed. No pertinent surgical history.  Social History   Substance Use Topics    Smoking status: Never Smoker    Smokeless tobacco: Never Used    Alcohol use Yes     "  Comment: socially     OB History    Para Term  AB Living   0             SAB TAB Ectopic Multiple Live Births                         /72   Ht 5' 5" (1.651 m)   Wt 109.9 kg (242 lb 6.3 oz)   LMP 2017   BMI 40.34 kg/m²     PHYSICAL EXAM:  GENERAL: Calm and appropriate affect, alert, oriented x4  SKIN: Color appropriate for race, warm and dry, clean and intact with no rashes.  RESP: Even, unlabored breathing  ABDOMEN: Soft, nontender, no masses.  :   Normal external female genitalia without lesions. Normal hair distribution. Adequate perineal body, normal urethral meatus.  Vagina pink and well rugated, no lesions, vaginal discharge - minimal, mucous like- no odor  No significant cystocele or rectocele.  Cervix pink without discharge or lesions, no cervical motion tenderness.  Uterus 4-6 weeks size, regular, mobile and nontender.  Adnexa: normal adnexa in size, nontender and no masses        ASSESSMENT / PLAN:    ICD-10-CM ICD-9-CM    1. Acute vaginitis N76.0 616.10 Vaginosis Screen by DNA Probe   2. Possible exposure to STD Z20.2 V01.6 C. trachomatis/N. gonorrhoeae by AMP DNA Cervix      Hepatitis B surface antigen      Hepatitis C antibody      HIV-1 and HIV-2 antibodies      RPR     Acute vaginitis  -     Vaginosis Screen by DNA Probe    Possible exposure to STD  -     C. trachomatis/N. gonorrhoeae by AMP DNA Cervix  -     Hepatitis B surface antigen; Future; Expected date: 2018  -     Hepatitis C antibody; Future; Expected date: 2018  -     HIV-1 and HIV-2 antibodies; Future; Expected date: 2018  -     RPR; Future; Expected date: 2018          Patient was counseled today on vaginitis prevention including :  a. avoiding feminine products such as deoderant soaps, body wash, bubble bath, douches, scented toilet paper, deoderant tampons or pads, feminine wipes, chronic pad use, etc.  b. avoiding other vulvovaginal irritants such as long hot baths, humidity, " tight, synthetic clothing, chlorine and sitting around in wet bathing suits  c. wearing cotton underwear, avoiding thong underwear and no underwear to bed  d. taking showers instead of baths and use a hair dryer on cool setting afterwards to dry  e. wearing cotton to exercise and shower immediately after exercise and change clothes  f. using polyurethane condoms without spermicide if sexually active and symptoms are triggered by intercourse  g. Discussed use of vagisil, along with repHresh and probiotics    FOLLOW UP:   Pending lab results, PRN lack of improvement.

## 2018-01-06 LAB
C TRACH DNA SPEC QL NAA+PROBE: NOT DETECTED
N GONORRHOEA DNA SPEC QL NAA+PROBE: NOT DETECTED

## 2018-01-08 LAB
HBV SURFACE AG SERPL QL IA: NEGATIVE
HCV AB SERPL QL IA: NEGATIVE
HIV 1+2 AB+HIV1 P24 AG SERPL QL IA: NEGATIVE
RPR SER QL: NORMAL

## 2018-01-09 ENCOUNTER — PATIENT MESSAGE (OUTPATIENT)
Dept: OBSTETRICS AND GYNECOLOGY | Facility: CLINIC | Age: 28
End: 2018-01-09

## 2018-01-12 PROCEDURE — 99283 EMERGENCY DEPT VISIT LOW MDM: CPT | Mod: ,,, | Performed by: EMERGENCY MEDICINE

## 2018-01-12 PROCEDURE — 99283 EMERGENCY DEPT VISIT LOW MDM: CPT

## 2018-01-13 ENCOUNTER — HOSPITAL ENCOUNTER (EMERGENCY)
Facility: HOSPITAL | Age: 28
Discharge: HOME | End: 2018-01-13
Attending: EMERGENCY MEDICINE
Payer: COMMERCIAL

## 2018-01-13 VITALS
HEIGHT: 65 IN | WEIGHT: 225 LBS | BODY MASS INDEX: 37.49 KG/M2 | SYSTOLIC BLOOD PRESSURE: 121 MMHG | RESPIRATION RATE: 16 BRPM | HEART RATE: 92 BPM | TEMPERATURE: 98 F | OXYGEN SATURATION: 97 % | DIASTOLIC BLOOD PRESSURE: 83 MMHG

## 2018-01-13 DIAGNOSIS — K92.1 BLOOD IN STOOL: Primary | ICD-10-CM

## 2018-01-13 LAB
BUN SERPL-MCNC: 15 MG/DL (ref 6–30)
CHLORIDE SERPL-SCNC: 105 MMOL/L (ref 95–110)
CREAT SERPL-MCNC: 1 MG/DL (ref 0.5–1.4)
GLUCOSE SERPL-MCNC: 102 MG/DL (ref 70–110)
HCT VFR BLD CALC: 34 %PCV (ref 36–54)
POC IONIZED CALCIUM: 1.17 MMOL/L (ref 1.06–1.42)
POC TCO2 (MEASURED): 24 MMOL/L (ref 23–29)
POTASSIUM BLD-SCNC: 4.1 MMOL/L (ref 3.5–5.1)
SAMPLE: ABNORMAL
SODIUM BLD-SCNC: 139 MMOL/L (ref 136–145)

## 2018-01-13 NOTE — ED PROVIDER NOTES
Encounter Date: 1/12/2018       History     Chief Complaint   Patient presents with    Rectal Bleeding     Pt reports blood in her stool x 1 week. Reports rectal pain while having a BM as well as lower abdominal pain. Denies change in bowel pattern.      Patient is a 27-year-old female in no significant past medical history presents with complaint of rectal bleeding for the last week.  The patient reports that she's been having normal bowel movements approximately once a day and has noticed that there was been bright red blood mixed into her stool.  She reports associated abdominal pain that occurs all of the time across the lower quadrants of her abdomen that she describes as dull and aching that worsens whenever she needs to have a bowel movement.  She noticed that the pain is gradually getting worse.  She's not had any issues like this before.  She is not having any issues tolerating by mouth intake.  She denies any nausea or vomiting.          Review of patient's allergies indicates:  No Known Allergies  Past Medical History:   Diagnosis Date    Abnormal Pap smear 1/3/2013    Allergy     Hypertension     Panic attacks      No past surgical history on file.  Family History   Problem Relation Age of Onset    Hypertension Father     Diabetes Father     Heart attacks under age 50 Father     Hypertension Maternal Grandmother     Hypertension Paternal Grandmother     Hypertension Paternal Grandfather     Diabetes Paternal Grandfather     Heart attack Paternal Grandfather     Breast cancer Neg Hx     Ovarian cancer Neg Hx     Melanoma Neg Hx      Social History   Substance Use Topics    Smoking status: Never Smoker    Smokeless tobacco: Never Used    Alcohol use Yes      Comment: socially     Review of Systems   Constitutional: Negative for fever.   HENT: Negative for sore throat.    Respiratory: Negative for shortness of breath.    Cardiovascular: Negative for chest pain.   Gastrointestinal:  Positive for abdominal pain and blood in stool. Negative for nausea.   Genitourinary: Negative for dysuria.   Musculoskeletal: Negative for back pain.   Skin: Negative for rash.   Neurological: Negative for weakness.   Hematological: Does not bruise/bleed easily.       Physical Exam     Initial Vitals [01/12/18 2328]   BP Pulse Resp Temp SpO2   139/88 97 14 98.8 °F (37.1 °C) 98 %      MAP       105         Physical Exam    Constitutional: She appears well-developed and well-nourished. She is not diaphoretic. No distress.   HENT:   Head: Normocephalic and atraumatic.   Eyes: EOM are normal. Pupils are equal, round, and reactive to light.   Neck: Normal range of motion. Neck supple. No thyromegaly present. No tracheal deviation present.   Cardiovascular: Normal rate, regular rhythm, normal heart sounds and intact distal pulses. Exam reveals no gallop and no friction rub.    No murmur heard.  Pulmonary/Chest: Breath sounds normal. No stridor. No respiratory distress. She has no wheezes. She has no rales.   Abdominal: Soft. Bowel sounds are normal. She exhibits no distension. There is tenderness. There is no rebound.   Musculoskeletal: Normal range of motion. She exhibits no edema.   Neurological: She is alert and oriented to person, place, and time. No cranial nerve deficit.   Skin: Skin is warm and dry. No erythema.   Psychiatric: She has a normal mood and affect. Her behavior is normal. Thought content normal.         ED Course   Procedures  Labs Reviewed   ISTAT PROCEDURE - Abnormal; Notable for the following:        Result Value    POC Hematocrit 34 (*)     All other components within normal limits             Medical Decision Making:   Initial Assessment:   27-year-old female with abdominal pain and blood in her stools for the last week.  Differential Diagnosis:   Hemorrhoids, inflammatory bowel disease, diverticulosis, PID, ovarian torsion, appy among other diagnoses.       APC / Resident Notes:   1:19 AM:  Checking stat for hematocrit level.    HCT 34, felt bleeding most likely hemorrhoidal in nature vs possible IBS vs IBD.  VSS and clinically stable appearing.  FElt stable for DC and o/p f/u with PCP.  Given resources for f/u.    Pt Given strict return precautions for rectal bleeding and abd pain and pt expressed understanding of these.           Attending Attestation:   Physician Attestation Statement for Resident:  As the supervising MD   Physician Attestation Statement: I have personally seen and examined this patient.   I agree with the above history. -:   As the supervising MD I agree with the above PE.    As the supervising MD I agree with the above treatment, course, plan, and disposition.  I have reviewed and agree with the residents interpretation of the following: lab data.                    ED Course      Clinical Impression:   The encounter diagnosis was Blood in stool.    Disposition:   Disposition: Discharged                        Jesus Manuel Marquez MD  01/15/18 1951

## 2018-01-13 NOTE — ED TRIAGE NOTES
Carie Sun, a 27 y.o. female presents to the ED c/o bright red blood after having bowel movements. Pt reports straining when having BM. Pt also c/o lower abdominal pain described as pressure x 1 week. Denies urinary symptoms.      Chief Complaint   Patient presents with    Rectal Bleeding     Pt reports blood in her stool x 1 week. Reports rectal pain while having a BM as well as lower abdominal pain. Denies change in bowel pattern.      Review of patient's allergies indicates:  No Known Allergies  Past Medical History:   Diagnosis Date    Abnormal Pap smear 1/3/2013    Allergy     Hypertension     Panic attacks         Adult Physical Assessment  LOC: Carie Sun, 27 y.o. female verified via two identifiers.  The patient is awake, alert, oriented and speaking appropriately at this time.  APPEARANCE: Patient resting comfortably and appears to be in no acute distress at this time. Patient is clean and well groomed, patient's clothing is properly fastened.  SKIN:The skin is warm and dry, color consistent with ethnicity, patient has normal skin turgor and moist mucus membranes, skin intact, no breakdown or brusing noted.  MUSCULOSKELETAL: Patient moving all extremities well, no obvious swelling or deformities noted.  RESPIRATORY: Airway is open and patent, respirations are spontaneous, patient has a normal effort and rate, no accessory muscle use noted.  CARDIAC: Patient has a normal rate and rhythm, no periphreal edema noted in any extremity, capillary refill < 3 seconds in all extremities  ABDOMEN: Soft and non tender to palpation, no abdominal distention noted. Bowel sounds present in all four quadrants.  NEUROLOGIC: Eyes open spontaneously, behavior appropriate to situation, follows commands, facial expression symmetrical, bilateral hand grasp equal and even, purposeful motor response noted, normal sensation in all extremities when touched with a finger.

## 2018-01-25 ENCOUNTER — OFFICE VISIT (OUTPATIENT)
Dept: OBSTETRICS AND GYNECOLOGY | Facility: CLINIC | Age: 28
End: 2018-01-25
Payer: COMMERCIAL

## 2018-01-25 VITALS
SYSTOLIC BLOOD PRESSURE: 114 MMHG | HEIGHT: 65 IN | WEIGHT: 239.06 LBS | BODY MASS INDEX: 39.83 KG/M2 | DIASTOLIC BLOOD PRESSURE: 82 MMHG

## 2018-01-25 VITALS
BODY MASS INDEX: 39.67 KG/M2 | WEIGHT: 238.13 LBS | HEIGHT: 65 IN | SYSTOLIC BLOOD PRESSURE: 122 MMHG | DIASTOLIC BLOOD PRESSURE: 82 MMHG

## 2018-01-25 DIAGNOSIS — N90.89 VULVAR IRRITATION: Primary | ICD-10-CM

## 2018-01-25 DIAGNOSIS — Z01.419 PAP SMEAR, AS PART OF ROUTINE GYNECOLOGICAL EXAMINATION: ICD-10-CM

## 2018-01-25 DIAGNOSIS — Z01.411 ENCOUNTER FOR GYNECOLOGICAL EXAMINATION WITH ABNORMAL FINDING: Primary | ICD-10-CM

## 2018-01-25 DIAGNOSIS — Z30.011 ENCOUNTER FOR INITIAL PRESCRIPTION OF CONTRACEPTIVE PILLS: ICD-10-CM

## 2018-01-25 PROCEDURE — 99999 PR PBB SHADOW E&M-EST. PATIENT-LVL II: CPT | Mod: PBBFAC,,, | Performed by: OBSTETRICS & GYNECOLOGY

## 2018-01-25 PROCEDURE — 99999 PR PBB SHADOW E&M-EST. PATIENT-LVL III: CPT | Mod: PBBFAC,,,

## 2018-01-25 PROCEDURE — 99213 OFFICE O/P EST LOW 20 MIN: CPT | Mod: S$GLB,,, | Performed by: OBSTETRICS & GYNECOLOGY

## 2018-01-25 PROCEDURE — 99395 PREV VISIT EST AGE 18-39: CPT | Mod: S$GLB,,, | Performed by: OBSTETRICS & GYNECOLOGY

## 2018-01-25 RX ORDER — CLOTRIMAZOLE AND BETAMETHASONE DIPROPIONATE 10; .64 MG/G; MG/G
CREAM TOPICAL
Qty: 15 G | Refills: 1 | Status: SHIPPED | OUTPATIENT
Start: 2018-01-25 | End: 2019-01-25

## 2018-01-25 RX ORDER — LEVONORGESTREL / ETHINYL ESTRADIOL AND ETHINYL ESTRADIOL 150-30(84)
1 KIT ORAL DAILY
Qty: 84 EACH | Refills: 4 | Status: SHIPPED | OUTPATIENT
Start: 2018-01-25 | End: 2019-02-23 | Stop reason: SDUPTHER

## 2018-01-25 RX ORDER — PHENTERMINE HYDROCHLORIDE 37.5 MG/1
TABLET ORAL
Refills: 2 | COMMUNITY
Start: 2017-12-09 | End: 2018-05-23 | Stop reason: SDUPTHER

## 2018-01-25 NOTE — PROGRESS NOTES
"Carie Sun is a 27 y.o. female  presents with complaint of vulvar irritation around her panty line. Denies abnormal vaginal discharge, no odor. Reports mild itching in groin area. Reports travelling and using a new soap at the hotel that she thinks sparked an irritation, then wore new control top underwear that she thinks was too tight and made situation worse. Symptoms have been there for about 5 days now.     Past Medical History:   Diagnosis Date    Abnormal Pap smear 1/3/2013    Allergy     Hypertension     Panic attacks      History reviewed. No pertinent surgical history.  Social History   Substance Use Topics    Smoking status: Never Smoker    Smokeless tobacco: Never Used    Alcohol use Yes      Comment: socially     Family History   Problem Relation Age of Onset    Hypertension Father     Diabetes Father     Heart attacks under age 50 Father     Hypertension Maternal Grandmother     Hypertension Paternal Grandmother     Hypertension Paternal Grandfather     Diabetes Paternal Grandfather     Heart attack Paternal Grandfather     Breast cancer Neg Hx     Ovarian cancer Neg Hx     Melanoma Neg Hx      OB History    Para Term  AB Living   0             SAB TAB Ectopic Multiple Live Births                         /82   Ht 5' 5" (1.651 m)   Wt 108.4 kg (239 lb 1.4 oz)   LMP 2018 (Approximate)   BMI 39.79 kg/m²     ROS:  GENERAL: No fever, chills, fatigability or weight loss.  VULVAR: No pain, no lesions and + itching and discomfort in panty line area R worse then L side  VAGINAL: No relaxation, no itching, no discharge, no abnormal bleeding and no lesions.  ABDOMEN: No abdominal pain. Denies nausea. Denies vomiting. No diarrhea. No constipation  BREAST: Denies pain. No lumps. No discharge.  URINARY: No incontinence, no nocturia, no frequency and no dysuria.  CARDIOVASCULAR: No chest pain. No shortness of breath. No leg cramps.  NEUROLOGICAL: No " headaches. No vision changes.    PHYSICAL EXAM:  VULVA: normal appearing vulva with no masses, tenderness or lesions r groin area red and irritated  VAGINA: normal appearing vagina with normal color. no discharge, no lesions   CERVIX: normal appearing cervix without discharge or lesions   UTERUS: uterus is normal size, shape, consistency and nontender   ADNEXA: normal adnexa in size, nontender and no masses    ASSESSMENT and PLAN:    ICD-10-CM ICD-9-CM    1. Vulvar dermatitis N76.89 616.89 clotrimazole-betamethasone 1-0.05% (LOTRISONE) cream     -rx lotrisone today  -discussed loose fitting underwear and garments, may also use A&D ointment  -RTC with no improvement    Patient was counseled today on vaginitis prevention including :  a. avoiding feminine products such as deoderant soaps, body wash, bubble bath, douches, scented toilet paper, deoderant tampons or pads, feminine wipes, chronic pad use, etc.  b. avoiding other vulvovaginal irritants such as long hot baths, humidity, tight, synthetic clothing, chlorine and sitting around in wet bathing suits  c. wearing cotton underwear, avoiding thong underwear and no underwear to bed  d. taking showers instead of baths and use a hair dryer on cool setting afterwards to dry  e. wearing cotton to exercise and shower immediately after exercise and change clothes  f. using polyurethane condoms without spermicide if sexually active and symptoms are triggered by intercourse    FOLLOW UP: PRN lack of improvement.

## 2018-01-29 NOTE — PROGRESS NOTES
SUBJECTIVE:   27 y.o. female   for annual routine Pap and checkup. Patient's last menstrual period was 2018 (approximate)..  She has no unusual complaints.        Past Medical History:   Diagnosis Date    Abnormal Pap smear 1/3/2013    Allergy     Panic attacks      History reviewed. No pertinent surgical history.  Social History     Social History    Marital status: Single     Spouse name: N/A    Number of children: N/A    Years of education: N/A     Occupational History    Not on file.     Social History Main Topics    Smoking status: Never Smoker    Smokeless tobacco: Never Used    Alcohol use Yes      Comment: socially    Drug use: No    Sexual activity: Yes     Partners: Male     Birth control/ protection: Condom     Other Topics Concern    Not on file     Social History Narrative    No narrative on file     Family History   Problem Relation Age of Onset    Hypertension Father     Diabetes Father     Heart attacks under age 50 Father     Hypertension Maternal Grandmother     Hypertension Paternal Grandmother     Hypertension Paternal Grandfather     Diabetes Paternal Grandfather     Heart attack Paternal Grandfather     Breast cancer Neg Hx     Ovarian cancer Neg Hx     Melanoma Neg Hx      OB History    Para Term  AB Living   0             SAB TAB Ectopic Multiple Live Births                           Current Outpatient Prescriptions   Medication Sig Dispense Refill    ACZONE 7.5 % GlwP       citalopram (CELEXA) 10 MG tablet Take 1 tablet (10 mg total) by mouth once daily. 30 tablet 0    clindamycin (CLEOCIN T) 1 % Swab       clotrimazole-betamethasone 1-0.05% (LOTRISONE) cream Apply to affected area 2 times daily 15 g 1    doxycycline (ORACEA) 40 mg capsule       ibuprofen (ADVIL,MOTRIN) 600 MG tablet Take 1 tablet (600 mg total) by mouth every 6 (six) hours as needed for Pain. 20 tablet 0    ibuprofen (ADVIL,MOTRIN) 800 MG tablet Take 1 tablet (800 mg  "total) by mouth every 6 (six) hours as needed for Pain. 20 tablet 0    phentermine (ADIPEX-P) 37.5 mg tablet TAKE 1 TABLET BY MOUTH BEFOR BREAKFAST  2    PROAIR HFA 90 mcg/actuation inhaler INHALE 2 PUFF EVERY 4 TO 6 HRS AS NEEDED  0    SOOLANTRA 1 % Crea       L norgest/e.estradiol-e.estrad 0.15 mg-30 mcg (84)/10 mcg (7) 3MPk Take 1 tablet by mouth once daily. 84 each 4     No current facility-administered medications for this visit.      Allergies: Patient has no known allergies.     ROS:  Constitutional: no weight loss, weight gain, fever, fatigue  Eyes:  No vision changes, glasses/contacts  ENT/Mouth: No ulcers, sinus problems, ears ringing, headache  Cardiovascular: No inability to lie flat, chest pain, exercise intolerance, swelling, heart palpitations  Respiratory: No wheezing, coughing blood, shortness of breath, or cough  Gastrointestinal: No diarrhea, bloody stool, nausea/vomiting, constipation, gas, hemorrhoids  Genitourinary: No blood in urine, painful urination, urgency of urination, frequency of urination, incomplete emptying, incontinence, abnormal bleeding, painful periods, heavy periods, vaginal discharge, vaginal odor, painful intercourse, sexual problems, bleeding after intercourse.  Musculoskeletal: No muscle weakness  Skin/Breast: No painful breasts, nipple discharge, masses, rash, ulcers  Neurological: No passing out, seizures, numbness, headache  Endocrine: No diabetes, hypothyroid, hyperthyroid, hot flashes, hair loss, abnormal hair growth, ance  Psychiatric: No depression, crying  Hematologic: No bruises, bleeding, swollen lymph nodes, anemia.      OBJECTIVE:   The patient appears well, alert, oriented x 3, in no distress.  /82   Ht 5' 5" (1.651 m)   Wt 108 kg (238 lb 1.6 oz)   LMP 01/18/2018 (Approximate)   BMI 39.62 kg/m²   NECK: no thyromegaly, trachea midline  SKIN: no acne, striae, hirsutism  BREAST EXAM: breasts appear normal, no suspicious masses, no skin or nipple " changes or axillary nodes  ABDOMEN: soft, non-tender; bowel sounds normal; no masses,  no organomegaly and no hernias, masses, or hepatosplenomegaly  GENITALIA: normal external genitalia, no erythema, no discharge  URETHRA: normal appearing urethra with no masses, tenderness or lesions and normal urethra, normal urethral meatus  VAGINA: Normal  CERVIX: no lesions or cervical motion tenderness  UTERUS: normal size, contour, position, consistency, mobility, non-tender  ADNEXA: no mass, fullness, tenderness    \  ASSESSMENT:   Demarcus was seen today for gynecologic exam and contraception.    Diagnoses and all orders for this visit:    Encounter for gynecological examination with abnormal finding  Health Maintenance   Topic Date Due    Lipid Panel  1991    HPV Vaccines (1 of 3 - Female 3 Dose Series) 09/08/2002    TETANUS VACCINE  09/20/2013    Influenza Vaccine  08/01/2017    Pap Smear  08/02/2019       Pap smear, as part of routine gynecological examination    Encounter for initial prescription of contraceptive pills  -     L norgest/e.estradiol-e.estrad 0.15 mg-30 mcg (84)/10 mcg (7) 3MPk; Take 1 tablet by mouth once daily.  The use of the oral contraceptive has been fully discussed with the patient. This includes the proper method to initiate and continue the pills, the need for regular compliance to ensure adequate contraceptive effect, the physiology which make the pill effective, the instructions for what to do in event of a missed pill, and warnings about anticipated minor side effects such as breakthrough spotting, nausea, breast tenderness, weight changes, acne, headaches, etc.  She has been told of the more serious potential side effects such as MI, stroke, and deep vein thrombosis, all of which are very unlikely.  She has been asked to report any signs of such serious problems immediately.  She should back up the pill with a condom during any cycle in which antibiotics are prescribed, and during  the first cycle as well. The need for additional protection, such as a condom, to prevent exposure to sexually transmitted diseases has also been discussed- the patient has been clearly reminded that OCP's cannot protect her against diseases such as HIV and others. She understands and wishes to take the medication as prescribed.

## 2018-02-26 ENCOUNTER — OFFICE VISIT (OUTPATIENT)
Dept: BARIATRICS | Facility: CLINIC | Age: 28
End: 2018-02-26
Payer: COMMERCIAL

## 2018-02-26 VITALS
WEIGHT: 237 LBS | BODY MASS INDEX: 39.49 KG/M2 | SYSTOLIC BLOOD PRESSURE: 125 MMHG | HEART RATE: 85 BPM | DIASTOLIC BLOOD PRESSURE: 74 MMHG | HEIGHT: 65 IN

## 2018-02-26 DIAGNOSIS — E66.01 SEVERE OBESITY WITH BODY MASS INDEX (BMI) OF 35.0 TO 39.9: Primary | ICD-10-CM

## 2018-02-26 PROCEDURE — 99999 PR PBB SHADOW E&M-EST. PATIENT-LVL III: CPT | Mod: PBBFAC,,, | Performed by: INTERNAL MEDICINE

## 2018-02-26 PROCEDURE — 99213 OFFICE O/P EST LOW 20 MIN: CPT | Mod: S$GLB,,, | Performed by: INTERNAL MEDICINE

## 2018-02-26 PROCEDURE — 3008F BODY MASS INDEX DOCD: CPT | Mod: S$GLB,,, | Performed by: INTERNAL MEDICINE

## 2018-02-26 RX ORDER — PREDNISONE 20 MG/1
TABLET ORAL
COMMUNITY
Start: 2017-12-28 | End: 2018-03-01

## 2018-02-26 NOTE — PROGRESS NOTES
"Subjective:       Patient ID: Carie Sun is a 27 y.o. female.    Chief Complaint: Follow-up ( medical weight loss)    Pt here today for follow-up. Has lost only 2 lbs. Filled phentermine once 12/9/2017. She has not really been taking it. She did into put forth much effort with eating and exercise. She does think she saw a difference in her appetite when she took it. Denies SE. She has decreased her intake of starches and cut out juice. Says she has been under a lot of stress. Does plan to see her pcp to discuss the stress.       Review of Systems   Constitutional: Negative for chills and fever.   Respiratory: Negative for shortness of breath.         + snores.    Cardiovascular: Negative for chest pain and leg swelling.   Gastrointestinal: Negative for constipation and diarrhea.        Denies GERD   Genitourinary: Negative for difficulty urinating and menstrual problem.   Musculoskeletal: Positive for arthralgias. Negative for back pain.        Ankle   Neurological: Negative for dizziness and light-headedness.   Psychiatric/Behavioral: Negative for dysphoric mood. The patient is nervous/anxious.        Objective:     /74   Pulse 85   Ht 5' 5" (1.651 m)   Wt 107.5 kg (236 lb 15.9 oz)   LMP 02/08/2018 (Approximate)   BMI 39.44 kg/m²     Physical Exam   Constitutional: She is oriented to person, place, and time. She appears well-developed. No distress.   Obese   HENT:   Head: Normocephalic and atraumatic.   Eyes: EOM are normal. Pupils are equal, round, and reactive to light. No scleral icterus.   Neck: Normal range of motion. Neck supple.   Cardiovascular: Normal rate and normal heart sounds.  Exam reveals no gallop and no friction rub.    No murmur heard.  Pulmonary/Chest: Effort normal and breath sounds normal. No respiratory distress. She has no wheezes.   Musculoskeletal: Normal range of motion. She exhibits no edema.   Neurological: She is alert and oriented to person, place, and time. No " cranial nerve deficit.   Skin: Skin is warm and dry. No erythema.   Psychiatric: She has a normal mood and affect. Her behavior is normal. Judgment normal.   Vitals reviewed.      Assessment:       1. Severe obesity with body mass index (BMI) of 35.0 to 39.9        Plan:         1. Severe obesity (BMI 35.0-39.9)        She can use the remaining meds and rf she has. 30 min recipes given. Pt advised that she can start with some smaller changes.      Patient warned of common side effects of phentermine including anxiety, insomnia, palpitations and increased blood pressure. It was also explained that it is for short-term usage along with diet and exercise, and that stopping the medication without making lifestyle changes will result in regain of weight. Patient states understanding.     Weight loss medications are controlled substances.  They require routine follow up. Prescription or pills that are lost or destroyed will not be replaced.     Start phentermine with 1/2 pill a day to see if that will control your appetite.  Go up to a full pill when needed.     3 meals a day made up of the following:  Unlimited green vegetables, tomatoes, mushrooms, spaghetti squash, cauliflower, meat, poultry, seafood, eggs and hard cheeses.   Milk and plain yogurt  Dressings, seasonings, condiments, etc should have less than 2 g sugars.   beans or nuts can have 1 x a day.   1-2 servings of citrus fruits, berries, pineapple or melon a day (1/2 cup)  Avoid fried foods    No grains, rice, pasta, potatoes, bread, corn, peas, oatmeal, grits, tortillas, crackers, chips    No soda, sweet tea, juices or lemonade    [email or web address] for recipes. Moderate carb intake

## 2018-02-26 NOTE — PATIENT INSTRUCTIONS
Patient warned of common side effects of phentermine including anxiety, insomnia, palpitations and increased blood pressure. It was also explained that it is for short-term usage along with diet and exercise, and that stopping the medication without making lifestyle changes will result in regain of weight. Patient states understanding.     Weight loss medications are controlled substances.  They require routine follow up. Prescription or pills that are lost or destroyed will not be replaced.     Start phentermine with 1/2 pill a day to see if that will control your appetite.  Go up to a full pill when needed.     3 meals a day made up of the following:  Unlimited green vegetables, tomatoes, mushrooms, spaghetti squash, cauliflower, meat, poultry, seafood, eggs and hard cheeses.   Milk and plain yogurt  Dressings, seasonings, condiments, etc should have less than 2 g sugars.   beans or nuts can have 1 x a day.   1-2 servings of citrus fruits, berries, pineapple or melon a day (1/2 cup)  Avoid fried foods    No grains, rice, pasta, potatoes, bread, corn, peas, oatmeal, grits, tortillas, crackers, chips    No soda, sweet tea, juices or lemonade    [email or web address] for recipes. Moderate carb intake      Dinner in Under 30 minutes and 400 calories.     Baked Chicken breast with Broccoli Cheese Casserole  2-3 chicken breast (approximately 6-8 oz each)    2 tsp each garlic and herb Mrs. Dash seasoning   2 tsp your favorite creole seasoning  2 tablespoons of balsamic vinegar  2 - 10 oz packages of frozen broccoli  1 egg   ½ cup skim milk  ½ tsp paprika   ½ tsp cayenne pepper   1 can fat free cream of mushroom soup.   1 .5 cups of shredded cheddar cheese    Pre-heat oven to 450 degrees. Toss 2-3 chicken breast (approximately 6-8 oz each) in 2 tsp each garlic and herb Mrs. Dash seasoning, 2 tsp your favorite creole seasoning, and 2 tablespoons of balsamic vinegar. This can also be done up to 24 hours ahead of time, and the  chicken can be left in the refrigerator to marinate. Place on a baking sheet sprayed with non-stick cooking spray and bake on 450 degrees for 10 min, then reduce heat to 350 degrees and cook an addition 10-15 minutes until chicken is cooked through.   While chicken is baking, microwave safe dish, thaw 2 - 10 oz packages of frozen broccoli. Drain any excess water, season with salt, pepper, all-purpose seasoning of your choice. In another bowl, whisk together 1 egg, ½ cup skim milk, ½ tsp paprika, ½ tsp cayenne pepper and 1 can fat free cream of mushroom soup. Combine broccoli, soup mixture, 1 cup of shredded cheddar cheese in baking dish sprayed with cooking spray. Top with remaining cheese. Bake in the oven with chicken for about 20 min or until set cheese is melted and oakes.     Makes 4 servings. 389 calories per serving.10 g fat, 13 g carbs, 54g protein     Sheet Pan Izard Shrimp  1 pound large shrimp, shelled, peeled and deveined.   2 tablespoon olive oil  The zest and the juice of 1 lime  ½ tsp chili powder  ½-1 tsp cayenne pepper  1 tsp cumin  ½ tsp dry oregano  1 red bell pepper  1 green bell pepper  1 red onion  2 cups mushrooms, quartered  1 avocado  Whole eloise lettuce leaves  Preheat oven to 375 degrees.  In a bowl combine shrimp, lime juice, lime zest, cumin, cayenne pepper, chili powder, and a pinch of salt. Set aside.   Slice peppers and onions into thin strips. Toss in 1 tablespoon of olive oil. Sprinkle with salt and pepper and arrange in a single layer over 1/3 of a large sheet pan  Toss mushrooms with remaining olive oil, oregano, salt and pepper. Arrange in single layer on sheet pan. Place sheet pan in oven for about 15-20 minutes until vegetables are softened, cooked about ¾ of the way through. Remove the sheet pan from oven.  Change setting on oven to low broil.  If needed, rearrange vegetables to make room for shrimp. Arrange the shrimp in a single layer on the sheet pan and return pan to  oven. After 5 minutes, flip shrimp. Cook 3-5 additional minutes, or until  Shrimp are opaque.   Serve shrimp and cooked vegetables on lettuce leaves with sliced avocado.   Makes 4 servings. Calories per servin; 23g fat, 19 g carbohydrates, 27g protein.    Zoodles Primavera with Seasoned Ricotta  8 cups zucchini noodles. These can be purchased already prepared, or you can make them on your own with whole zucchini and a vegetable spiralizer.   1.5 cups cherry tomatoes, quartered  2 cups sliced mushrooms  1 cup chopped fresh broccoli  1 cup frozen peas and carrots  2 cloves garlic, finely chopped  16 oz part skim ricotta cheese  2 oz Neufchatel cream cream cheese, cut into small cubes  Juice and zest of 1 lemon  1 pinch red pepper flakes    2 tsp Italian seasoning  4-5 leaves fresh basil, thinly sliced  1 tablespoon of olive oil  Parmesan cheese for topping (optional)     In a bowl, combine ricotta cheese, 1 tsp Italian seasoning, ½ teaspoon lemon zest. Season with salt and pepper to taste. Mix well. Fold in half of fresh basil. Set aside.   Heat a large skillet to medium high. Add olive oil. Sautee mushrooms until starting to become tender. Season them with salt and pepper while cooking.  Add broccoli and peas and carrots. Reduce heat to medium. Cover pan and cook for 4-5 minutes until broccoli is tender and peas and carrots are warmed through. Add Neufchatel cheese, Italian seasoning, red pepper flakes, 1 tsp lemon zest and lemon juice. Stir until a smooth sauce is formed. Add zucchini noodles (zoodles) to skillet and toss in sauce, then add cherry tomatoes.  Separate zoodle and vegetables into 4 equal portions. Serve each with a ¼ cup serving of seasoned ricotta cheese. Sprinkle with grated parmesan cheese or fresh basil,  if desired.   Makes 4 servings. Calories per servin calories, 32 g carbs, 33 g protein, 18 g fat

## 2018-02-27 ENCOUNTER — HOSPITAL ENCOUNTER (EMERGENCY)
Facility: HOSPITAL | Age: 28
Discharge: HOME | End: 2018-02-27
Attending: EMERGENCY MEDICINE
Payer: COMMERCIAL

## 2018-02-27 ENCOUNTER — TELEPHONE (OUTPATIENT)
Dept: INTERNAL MEDICINE | Facility: CLINIC | Age: 28
End: 2018-02-27

## 2018-02-27 VITALS
SYSTOLIC BLOOD PRESSURE: 99 MMHG | OXYGEN SATURATION: 98 % | HEART RATE: 86 BPM | HEIGHT: 65 IN | DIASTOLIC BLOOD PRESSURE: 65 MMHG | TEMPERATURE: 98 F | RESPIRATION RATE: 14 BRPM | WEIGHT: 230 LBS | BODY MASS INDEX: 38.32 KG/M2

## 2018-02-27 DIAGNOSIS — F41.0 PANIC ATTACK: Primary | ICD-10-CM

## 2018-02-27 LAB
ALBUMIN SERPL BCP-MCNC: 3.6 G/DL
ALP SERPL-CCNC: 44 U/L
ALT SERPL W/O P-5'-P-CCNC: 14 U/L
ANION GAP SERPL CALC-SCNC: 8 MMOL/L
APAP SERPL-MCNC: <3 UG/ML
AST SERPL-CCNC: 18 U/L
BASOPHILS # BLD AUTO: 0.03 K/UL
BASOPHILS NFR BLD: 0.6 %
BILIRUB SERPL-MCNC: 0.3 MG/DL
BUN SERPL-MCNC: 12 MG/DL
CALCIUM SERPL-MCNC: 8.9 MG/DL
CHLORIDE SERPL-SCNC: 107 MMOL/L
CO2 SERPL-SCNC: 22 MMOL/L
CREAT SERPL-MCNC: 0.8 MG/DL
DIFFERENTIAL METHOD: ABNORMAL
EOSINOPHIL # BLD AUTO: 0.1 K/UL
EOSINOPHIL NFR BLD: 1.8 %
ERYTHROCYTE [DISTWIDTH] IN BLOOD BY AUTOMATED COUNT: 12.4 %
EST. GFR  (AFRICAN AMERICAN): >60 ML/MIN/1.73 M^2
EST. GFR  (NON AFRICAN AMERICAN): >60 ML/MIN/1.73 M^2
ETHANOL SERPL-MCNC: <10 MG/DL
GLUCOSE SERPL-MCNC: 95 MG/DL
HCT VFR BLD AUTO: 33.3 %
HGB BLD-MCNC: 11 G/DL
IMM GRANULOCYTES # BLD AUTO: 0.01 K/UL
IMM GRANULOCYTES NFR BLD AUTO: 0.2 %
LYMPHOCYTES # BLD AUTO: 1.2 K/UL
LYMPHOCYTES NFR BLD: 23.8 %
MCH RBC QN AUTO: 30.2 PG
MCHC RBC AUTO-ENTMCNC: 33 G/DL
MCV RBC AUTO: 92 FL
MONOCYTES # BLD AUTO: 0.3 K/UL
MONOCYTES NFR BLD: 6.6 %
NEUTROPHILS # BLD AUTO: 3.4 K/UL
NEUTROPHILS NFR BLD: 67 %
NRBC BLD-RTO: 0 /100 WBC
PLATELET # BLD AUTO: 279 K/UL
PMV BLD AUTO: 9.6 FL
POTASSIUM SERPL-SCNC: 4.4 MMOL/L
PROT SERPL-MCNC: 7 G/DL
RBC # BLD AUTO: 3.64 M/UL
SODIUM SERPL-SCNC: 137 MMOL/L
TSH SERPL DL<=0.005 MIU/L-ACNC: 1.48 UIU/ML
WBC # BLD AUTO: 5.01 K/UL

## 2018-02-27 PROCEDURE — 80053 COMPREHEN METABOLIC PANEL: CPT

## 2018-02-27 PROCEDURE — 80329 ANALGESICS NON-OPIOID 1 OR 2: CPT

## 2018-02-27 PROCEDURE — 93005 ELECTROCARDIOGRAM TRACING: CPT

## 2018-02-27 PROCEDURE — 99285 EMERGENCY DEPT VISIT HI MDM: CPT | Mod: ,,,

## 2018-02-27 PROCEDURE — 85025 COMPLETE CBC W/AUTO DIFF WBC: CPT

## 2018-02-27 PROCEDURE — 84443 ASSAY THYROID STIM HORMONE: CPT

## 2018-02-27 PROCEDURE — 93010 ELECTROCARDIOGRAM REPORT: CPT | Mod: ,,, | Performed by: INTERNAL MEDICINE

## 2018-02-27 PROCEDURE — 99285 EMERGENCY DEPT VISIT HI MDM: CPT | Mod: 25

## 2018-02-27 PROCEDURE — 80320 DRUG SCREEN QUANTALCOHOLS: CPT

## 2018-02-27 NOTE — TELEPHONE ENCOUNTER
"----- Message from [employer] Dia sent at 2/27/2018  9:49 AM CST -----  Contact: Dipesh/Mother/[phone]  RX request - refill or new RX.  Is this a refill or new RX:  refill  RX name and strength: citalopram (CELEXA) 10 MG tablet  Directions: Take 1 tablet (10 mg total) by mouth once daily. - Oral  Is this a 30 day or 90 day RX:    Pharmacy name and phone # (DON'T enter "on file" or "in chart"): [facility]- [phone] (Phone) or [phone] (Fax)  Comments:  Pt was sent to ER due to anxeity attack , the physiatrist states that patient needs to get script.  was the prescriber but mother states  is patient's PCP.        "

## 2018-02-27 NOTE — ED NOTES
Contacted patient's mother, Valeri Donnelly, per patient's request.  Mother states she will be here shortly.  Patient notified.

## 2018-02-27 NOTE — ED NOTES
Appearance:  Pt awake, alert & oriented to person, place & time.  Crying.   Skin:  Skin warm, dry & intact.  Mucous membranes moist.  Skin turgor normal.  Respiratory:  Respirations even, non-labored.    Neurologic:  Pt moving all extremities without difficulty.  Sensation intact.     Peripheral Vascular:  All peripheral pulses present.

## 2018-02-27 NOTE — ED NOTES
PEC received in [facility].  Psych resident note states patient does not meet criteria for PEC.

## 2018-02-27 NOTE — CONSULTS
"Emergency Psychiatry Consult Note      Chief Complaint / Reason for Consult:     suicidal ideation     Subjective:     History of Present Illness:   Carie Sun is a 27 y.o. female with a history of anxiety and depression who [place] [facility] [place] associated with a panic attack. Psychiatry was consulted to address the patient's symptoms of suicidal ideation.     Per ER Pa: "27-year-old female with medical history of anxiety presents to ED with chest pain. Patient states it woke her up from her sleep at 5a today and describes as central sternal pain that is non radiating. Reports 1 episode of nausea and vomiting.  During in the review, the patient became very tearful states that she is a harm to herself.  She had a dream that she killed herself with a gun.  Patient denies having a gun at home but states she has access to a family member's gun. Patient denies past suicidal attempt or inpatient psychiatric admissions. Has been on celexa for panic attacks in the past but has not taken medications in >3 months. She states her refill ran out and she never followed up with prescriber."    Per chart review: Patient had one appointment with an [facility]  > 1 year ago to discuss symptoms of anxiety and depression. She was diagnosed with an adjustment disorder and advised to continue celexa as prescribed by her PCP and seek counseling.  She did not follow up.   Utox pending at time of evaluation.  BAL < 10.     Patient was calm and cooperative when interviewed at the bedside.  She explained that she came to the ED with symptoms of a panic attack.  She is feeling better now.  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  She is living with her mother and stepfather.  Denies drug use and reports social alcohol use.  Patient explains that last night she had a dream of shooting herself.  This is the first time she has thought of harming herself.  No prior " attempts or suicide or self harm.  She has no suicidal thoughts currently.  She does not desire inpatient hospitalization at this time and does not feel she is a danger to herself.  She reports that she has not taken celexa for several months as she has not followed up with her PCP but it was helpful with depression and anxiety.  Has not received counseling as recommended by  she saw a year ago due to her busy work schedule.  She wishes to establish with an outpatient psychiatrist to continue medication and therapy.    Collateral: Valeri Donnelly, mother, [phone]  Mother reports that daughter is a perfectionist and has experienced anxiety for several years.  She does not feel her daughter is a legitimate danger to herself at this time and denies any history of severe depression or self harm.  Daughter is established in a job and active in her Taoism community.  She is close to family and living with mother and stepfather while she looks for her own home.  Mother feels comfortable with watching over the patient at home while she waits to connect with an outpatient provider.  She feels confident her 's weapon will remain locked up and safe.    Medical Review of Systems:  Pertinent items are noted in HPI.    Psychiatric Review of Systems:  sleep: yes  appetite: no  weight: yes  energy/anergy: no  interest/pleasure/anhedonia: yes  somatic symptoms: no  libido: no  anxiety/panic: no  guilty/hopelessness: yes  concentration: yes  S.I.B.s/risky behavior: no  any drugs: no  alcohol: no     Allergies:  Patient has no known allergies.    Past Medical/Surgical History:  Past Medical History:   Diagnosis Date    Abnormal Pap smear 1/3/2013    Allergy     Panic attacks      History reviewed. No pertinent surgical history.    Past Psychiatric History:  Previous Medication Trials: yes, celexa 20 mg daily as prescribed by PCP  Previous Psychiatric Hospitalizations: no   Previous Suicide Attempts: no    History of Violence: no  Outpatient Psychiatrist: no    Social History:  Marital Status: single  Children: 0   Employment Status/Info: currently employed, teacher  Education: college graduate  Special Ed: no  Housing Status: with mother and stepfather  History of phys/sexual abuse: no  Access to gun: does not personally own a gun, stepfather has one locked up in the home    Substance Abuse History:  Recreational Drugs: denied  Use of Alcohol: occasional, social use  Rehab History:no   Tobacco Use:no  Use of OTC: denied    Legal History:  Past Charges/Incarcerations:no   Pending charges:no     Family Psychiatric History:   Mother- anxiety     Objective:     Current Medications:  Infusions:    Scheduled:    PRN:      Home Medications:  Prior to Admission medications    Medication Sig Start Date End Date Taking? Authorizing Provider   ACZONE 7.5 % GlwP  5/8/17  Yes Historical Provider, MD   clindamycin (CLEOCIN T) 1 % Swab  5/8/17  Yes Historical Provider, MD   clotrimazole-betamethasone 1-0.05% (LOTRISONE) cream Apply to affected area 2 times daily 1/25/18 1/25/19 Yes Radha Castellano NP   doxycycline (ORACEA) 40 mg capsule  5/3/17  Yes Historical Provider, MD   L norgest/eWallyestradiol-e.estrad 0.15 mg-30 mcg (84)/10 mcg (7) 3MPk Take 1 tablet by mouth once daily. 1/25/18 1/25/19 Yes Clark Bernstein MD   phentermine (ADIPEX-P) 37.5 mg tablet TAKE 1 TABLET BY MOUTH BEFOR BREAKFAST 12/9/17  Yes Historical Provider, MD   SOOLANTRA 1 % Crea  5/8/17  Yes Historical Provider, MD   citalopram (CELEXA) 10 MG tablet Take 1 tablet (10 mg total) by mouth once daily. 3/15/17   Delmis Wilkinson MD   ibuprofen (ADVIL,MOTRIN) 600 MG tablet Take 1 tablet (600 mg total) by mouth every 6 (six) hours as needed for Pain. 8/6/16   Samantha Stanton PA-C   ibuprofen (ADVIL,MOTRIN) 800 MG tablet Take 1 tablet (800 mg total) by mouth every 6 (six) hours as needed for Pain. 6/24/17   Elizabeth Tayeh, PA-C   predniSONE (DELTASONE) 20 MG  tablet  12/28/17   Historical Provider, MD HAMPTON HFA 90 mcg/actuation inhaler INHALE 2 PUFF EVERY 4 TO 6 HRS AS NEEDED 12/28/17   Historical Provider, MD     Vital Signs:  Temp:  [99.2 °F (37.3 °C)]   Pulse:  []   Resp:  [18]   BP: (125-129)/(74-83)   SpO2:  [97 %]     Physical Exam:  Gen: Alert, calm, cooperative, NAD   Head: EOMI, MMM   Back: Symmetric, no curvature, ROM normal   Lungs: respirations unlabored   Chest wall: No tenderness or deformity   Heart: RRR   Abdomen: +BS   Extremities: no cyanosis or edema   Pulses: 2+ and symmetric all extremities   Skin: no rashes or lesions   Neurologic: CN II-XII grossly intact     Mental Status Exam:  Appearance: age appropriate, casually dressed, neatly groomed, lying in bed   Behavior: friendly and cooperative   Speech/Language: normal tone, normal rate, normal pitch, normal volume   Mood: anxious   Affect: Normal    Thought Process:  normal and logical   Thought Content: normal, no suicidality, no homicidality, delusions, or paranoia   Orientation: grossly intact   Cognition: grossly intact   Insight: fair   Judgment: fair     Laboratory Data:  Recent Results (from the past 48 hour(s))   CBC auto differential    Collection Time: 02/27/18  8:13 AM   Result Value Ref Range    WBC 5.01 3.90 - 12.70 K/uL    RBC 3.64 (L) 4.00 - 5.40 M/uL    Hemoglobin 11.0 (L) 12.0 - 16.0 g/dL    Hematocrit 33.3 (L) 37.0 - 48.5 %    MCV 92 82 - 98 fL    MCH 30.2 27.0 - 31.0 pg    MCHC 33.0 32.0 - 36.0 g/dL    RDW 12.4 11.5 - 14.5 %    Platelets 279 150 - 350 K/uL    MPV 9.6 9.2 - 12.9 fL    Immature Granulocytes 0.2 0.0 - 0.5 %    Gran # (ANC) 3.4 1.8 - 7.7 K/uL    Immature Grans (Abs) 0.01 0.00 - 0.04 K/uL    Lymph # 1.2 1.0 - 4.8 K/uL    Mono # 0.3 0.3 - 1.0 K/uL    Eos # 0.1 0.0 - 0.5 K/uL    Baso # 0.03 0.00 - 0.20 K/uL    nRBC 0 0 /100 WBC    Gran% 67.0 38.0 - 73.0 %    Lymph% 23.8 18.0 - 48.0 %    Mono% 6.6 4.0 - 15.0 %    Eosinophil% 1.8 0.0 - 8.0 %    Basophil% 0.6 0.0 -  1.9 %    Differential Method Automated    Comprehensive metabolic panel    Collection Time: 02/27/18  8:13 AM   Result Value Ref Range    Sodium 137 136 - 145 mmol/L    Potassium 4.4 3.5 - 5.1 mmol/L    Chloride 107 95 - 110 mmol/L    CO2 22 (L) 23 - 29 mmol/L    Glucose 95 70 - 110 mg/dL    BUN, Bld 12 6 - 20 mg/dL    Creatinine 0.8 0.5 - 1.4 mg/dL    Calcium 8.9 8.7 - 10.5 mg/dL    Total Protein 7.0 6.0 - 8.4 g/dL    Albumin 3.6 3.5 - 5.2 g/dL    Total Bilirubin 0.3 0.1 - 1.0 mg/dL    Alkaline Phosphatase 44 (L) 55 - 135 U/L    AST 18 10 - 40 U/L    ALT 14 10 - 44 U/L    Anion Gap 8 8 - 16 mmol/L    eGFR if African American >60.0 >60 mL/min/1.73 m^2    eGFR if non African American >60.0 >60 mL/min/1.73 m^2   Ethanol    Collection Time: 02/27/18  8:13 AM   Result Value Ref Range    Alcohol, Medical, Serum <10 <10 mg/dL   Acetaminophen level    Collection Time: 02/27/18  8:13 AM   Result Value Ref Range    Acetaminophen (Tylenol), Serum <3.0 (L) 10.0 - 20.0 ug/mL      No results found for: PHENYTOIN, PHENOBARB, VALPROATE, CBMZ  Imaging:  Imaging Results    None       Assessment:     Carie Sun is a 27 y.o. female with a history of anxiety, depression who [place] [facility] [place]27/2018 due to a panic attack.    Recommendations:     Generalized Anxiety Disorder with Panic Attack  Depressive Disorder Unspecified  · Patient does not meet PEC criteria at this time.  She is not an imminent danger to herself and has many protective factors against suicide including female gender, middle age, steady employment, close family relationships, Hindu involvement, no substance abuse, no prior suicide attempts, no current SI or plan.  Family is comfortable with discharge to home and outpatient follow up.  Mother counseled about gun safety within the home  · Patient recommended to establish care with outpatient psychiatry for medication management and psychotherapy.  Patient is aware of potential wait times to see  an outpatient provider and is comfortable meeting with her PCP to resume celexa.  Will defer psychotropic medications to outpatient provider.    Case discussed with staff psychiatrist, Richard Benson MD.      Roopa Urbano MD  [facility] PGY2  [phone]

## 2018-02-27 NOTE — ED PROVIDER NOTES
Encounter Date: 2/27/2018       History     Chief Complaint   Patient presents with    Chest Pain     sternal CP  , non radiating ight. constant.onset this am .      27-year-old female with medical history of anxiety presents to ED with chest pain. Patient states it woke her up from her sleep at 5a today and describes as central sternal pain that is non radiating. Reports 1 episode of nausea and vomiting.  During in the interview, patient became very tearful states that she is a harm to herself.  She had a dream that she killed herself with a gun.  Patient denies having a gun at home but states she has access to a family member's gun. Patient denies past suicidal attempt or inpatient psychiatric admissions. Has been on celexa for panic attacks in the past but has not taken medications in >3 months. She states her refill ran out and she never followed up with prescriber.           Review of patient's allergies indicates:  No Known Allergies  Past Medical History:   Diagnosis Date    Abnormal Pap smear 1/3/2013    Allergy     Panic attacks      History reviewed. No pertinent surgical history.  Family History   Problem Relation Age of Onset    Hypertension Father     Diabetes Father     Heart attacks under age 50 Father     Hypertension Maternal Grandmother     Hypertension Paternal Grandmother     Hypertension Paternal Grandfather     Diabetes Paternal Grandfather     Heart attack Paternal Grandfather     Breast cancer Neg Hx     Ovarian cancer Neg Hx     Melanoma Neg Hx      Social History   Substance Use Topics    Smoking status: Never Smoker    Smokeless tobacco: Never Used    Alcohol use Yes      Comment: socially     Review of Systems   Constitutional: Negative for chills, diaphoresis, fatigue and fever.   HENT: Negative for congestion and sore throat.    Eyes: Negative for visual disturbance.   Respiratory: Positive for chest tightness and shortness of breath. Negative for cough.     Cardiovascular: Positive for chest pain. Negative for palpitations.   Gastrointestinal: Positive for nausea and vomiting. Negative for abdominal pain.   Genitourinary: Negative for dysuria.   Musculoskeletal: Negative for back pain and myalgias.   Skin: Negative for rash.   Neurological: Negative for syncope, weakness, light-headedness and headaches.   Hematological: Does not bruise/bleed easily.   Psychiatric/Behavioral: Positive for suicidal ideas. Negative for confusion and hallucinations. The patient is nervous/anxious.        Physical Exam     Initial Vitals [02/27/18 0712]   BP Pulse Resp Temp SpO2   129/83 (!) 113 18 99.2 °F (37.3 °C) 97 %      MAP       98.33         Physical Exam    Vitals reviewed.  Constitutional: Vital signs are normal. She appears well-developed and well-nourished. She is not diaphoretic. No distress.   HENT:   Head: Normocephalic and atraumatic.   Nose: Nose normal.   Mouth/Throat: Oropharynx is clear and moist.   Eyes: Conjunctivae, EOM and lids are normal. Pupils are equal, round, and reactive to light. Lids are everted and swept, no foreign bodies found. Right eye exhibits no discharge. Left eye exhibits no discharge.   Neck: Trachea normal and normal range of motion. Neck supple. No thyromegaly present.   Cardiovascular: Normal pulses. Tachycardia present.    Pulmonary/Chest: Breath sounds normal. She has no wheezes. She has no rhonchi. She has no rales.   Abdominal: Soft. Normal appearance and bowel sounds are normal. There is no rebound and no guarding.   Musculoskeletal: She exhibits no edema.   Lymphadenopathy:     She has no cervical adenopathy.   Neurological: She is alert and oriented to person, place, and time. She has normal strength. No cranial nerve deficit or sensory deficit.   Skin: Skin is warm. Capillary refill takes less than 2 seconds. No rash noted. No cyanosis.   Psychiatric: Her mood appears anxious. Her speech is not rapid and/or pressured. She exhibits a  depressed mood. She expresses suicidal ideation. She expresses no homicidal ideation.   Tearful during interview         ED Course   Procedures  Labs Reviewed   CBC W/ AUTO DIFFERENTIAL - Abnormal; Notable for the following:        Result Value    RBC 3.64 (*)     Hemoglobin 11.0 (*)     Hematocrit 33.3 (*)     All other components within normal limits   COMPREHENSIVE METABOLIC PANEL - Abnormal; Notable for the following:     CO2 22 (*)     Alkaline Phosphatase 44 (*)     All other components within normal limits   ACETAMINOPHEN LEVEL - Abnormal; Notable for the following:     Acetaminophen (Tylenol), Serum <3.0 (*)     All other components within normal limits   TSH   ALCOHOL,MEDICAL (ETHANOL)             Medical Decision Making:   History:   Old Medical Records: I decided to obtain old medical records.  Old Records Summarized: records from clinic visits.  Initial Assessment:   27-year-old female with medical history of anxiety presents to ED with mid sternal chest pain that is non radiating. H/o panic attacks and has been off celexa for > 3 months. Tearful during interview and states she is a harm to herself. Had a dream last night that she committed suicide by shooting herself in the head.  Differential Diagnosis:   DDx includes but is not limited to panic attack, tachycardia, hyperthyroidism, substance abuse, MDD, substance withdrawal.   Clinical Tests:   Lab Tests: Ordered and Reviewed  Radiological Study: Ordered and Reviewed  Medical Tests: Ordered and Reviewed  ED Management:  Will get labs, EKG and consult psychiatry. PEC in place.     EKG shows sinus tach at 108 BPM.Labs are benign. No signs of intoxication, thyroid dysfunction, electrolyte abnormality.     9:46 AM Psychiatry evaluated patient and states that she is stable to be discharged. Will rescind PEC. Ordered not to send home with medication. Will diagnose with panic attack.  Patient is now calm and no longer tearful. Mother at bedside. When I asked  what she would do if suicidal ideation or homicidal ideation returned, she stated that she would return to the ED. .plans to follow up with PCP today or tomorrow. Both patient and mother are comfortable with discharge.  Discharged to home in stable condition, return to ED warnings given, follow up and patient care instructions given.        I have discussed the treatment and management of this patient with my supervisory physician, and we agree on the plan of care.                         Clinical Impression:   The encounter diagnosis was Panic attack.    Disposition:   Disposition: Discharged  Condition: Stable                        Skyla Izquierdo PA-C  02/27/18 [id number]

## 2018-02-27 NOTE — ED TRIAGE NOTES
Pt c/o chest pain that began intermittently last night.  States she woke up several times during the night with chest pain and felt anxious and like she was having a panic attack.  During PA interview pt states she has had thoughts of hurting herself since last night.  Pt denies any previous thoughts in the past.

## 2018-03-01 ENCOUNTER — OFFICE VISIT (OUTPATIENT)
Dept: INTERNAL MEDICINE | Facility: CLINIC | Age: 28
End: 2018-03-01
Payer: COMMERCIAL

## 2018-03-01 VITALS
RESPIRATION RATE: 16 BRPM | SYSTOLIC BLOOD PRESSURE: 110 MMHG | TEMPERATURE: 99 F | BODY MASS INDEX: 39.05 KG/M2 | HEIGHT: 65 IN | HEART RATE: 80 BPM | WEIGHT: 234.38 LBS | DIASTOLIC BLOOD PRESSURE: 77 MMHG

## 2018-03-01 DIAGNOSIS — Z20.828 EXPOSURE TO THE FLU: ICD-10-CM

## 2018-03-01 DIAGNOSIS — F41.9 ANXIETY: Primary | ICD-10-CM

## 2018-03-01 PROCEDURE — 99214 OFFICE O/P EST MOD 30 MIN: CPT | Mod: S$GLB,,, | Performed by: INTERNAL MEDICINE

## 2018-03-01 PROCEDURE — 99999 PR PBB SHADOW E&M-EST. PATIENT-LVL III: CPT | Mod: PBBFAC,,, | Performed by: INTERNAL MEDICINE

## 2018-03-01 RX ORDER — CITALOPRAM 10 MG/1
10 TABLET ORAL DAILY
Qty: 30 TABLET | Refills: 11 | Status: SHIPPED | OUTPATIENT
Start: 2018-03-01 | End: 2018-11-30 | Stop reason: SDUPTHER

## 2018-03-01 RX ORDER — OSELTAMIVIR PHOSPHATE 75 MG/1
75 CAPSULE ORAL 2 TIMES DAILY
Qty: 10 CAPSULE | Refills: 0 | Status: SHIPPED | OUTPATIENT
Start: 2018-03-01 | End: 2018-03-06

## 2018-03-01 RX ORDER — GABAPENTIN 300 MG/1
300 CAPSULE ORAL NIGHTLY PRN
Qty: 30 CAPSULE | Refills: 2 | Status: SHIPPED | OUTPATIENT
Start: 2018-03-01 | End: 2019-03-28

## 2018-03-01 NOTE — PROGRESS NOTES
Subjective:       Patient ID: Carie Sun is a 27 y.o. female.    Chief Complaint: Follow-up    HPI     27-year-old female here for evaluation of anxiety.  Patient is on no medication.  She reports that it is out of control.  She had a break down Tuesday morning on her way to work. She has not been able to sleep or manage.  She cannot focus on anything without feeling overwhelmed.  She took celexa prior and felt like this helped her.  She cannot sleep.  It is getting worse.  She was exercising and stopped because of work schedule.  She is having trouble sleeping and used to take benadryl for sleeping.  This is no longer effective for her.    Review of Systems   Constitutional: Negative for activity change and unexpected weight change.   HENT: Negative for hearing loss, rhinorrhea and trouble swallowing.    Eyes: Negative for discharge and visual disturbance.   Respiratory: Positive for chest tightness. Negative for wheezing.    Cardiovascular: Positive for palpitations. Negative for chest pain.   Gastrointestinal: Negative for blood in stool, constipation, diarrhea and vomiting.   Endocrine: Negative for polydipsia and polyuria.   Genitourinary: Negative for difficulty urinating, dysuria, hematuria and menstrual problem.   Musculoskeletal: Negative for arthralgias, joint swelling and neck pain.   Neurological: Positive for headaches. Negative for weakness.   Psychiatric/Behavioral: Positive for dysphoric mood. Negative for confusion.       Objective:      Physical Exam   Constitutional: She is oriented to person, place, and time. She appears well-developed and well-nourished.   HENT:   Head: Normocephalic and atraumatic.   Mouth/Throat: No oropharyngeal exudate.   Eyes: EOM are normal. Pupils are equal, round, and reactive to light. Right eye exhibits no discharge. Left eye exhibits no discharge. No scleral icterus.   Neck: Normal range of motion. Neck supple. No tracheal deviation present. No thyromegaly  present.   Cardiovascular: Normal rate, regular rhythm and normal heart sounds.  Exam reveals no gallop and no friction rub.    No murmur heard.  Pulmonary/Chest: Effort normal and breath sounds normal. No respiratory distress. She has no wheezes. She has no rales. She exhibits no tenderness.   Abdominal: Soft. Bowel sounds are normal. She exhibits no distension and no mass. There is no tenderness. There is no rebound and no guarding.   Musculoskeletal: Normal range of motion. She exhibits no edema or tenderness.   Neurological: She is alert and oriented to person, place, and time.   Skin: Skin is warm and dry. No rash noted. No erythema. No pallor.   Psychiatric: She has a normal mood and affect. Her behavior is normal.   Vitals reviewed.      Assessment:       1. Anxiety    2. Exposure to the flu        Plan:       1.  Restart Celexa.  Try gabapentin for sleep.  Return to clinic in 2 months to reassess.  2.  Tamiflu 75 mg twice a day for 5 days.

## 2018-03-02 ENCOUNTER — PATIENT MESSAGE (OUTPATIENT)
Dept: INTERNAL MEDICINE | Facility: CLINIC | Age: 28
End: 2018-03-02

## 2018-03-02 ENCOUNTER — PATIENT MESSAGE (OUTPATIENT)
Dept: BARIATRICS | Facility: CLINIC | Age: 28
End: 2018-03-02

## 2018-03-15 ENCOUNTER — HOSPITAL ENCOUNTER (EMERGENCY)
Facility: OTHER | Age: 28
Discharge: HOME | End: 2018-03-15
Attending: EMERGENCY MEDICINE
Payer: COMMERCIAL

## 2018-03-15 VITALS
HEART RATE: 70 BPM | SYSTOLIC BLOOD PRESSURE: 138 MMHG | RESPIRATION RATE: 18 BRPM | BODY MASS INDEX: 39.99 KG/M2 | OXYGEN SATURATION: 99 % | DIASTOLIC BLOOD PRESSURE: 72 MMHG | TEMPERATURE: 99 F | WEIGHT: 240 LBS | HEIGHT: 65 IN

## 2018-03-15 DIAGNOSIS — N93.8 DYSFUNCTIONAL UTERINE BLEEDING: Primary | ICD-10-CM

## 2018-03-15 LAB
B-HCG UR QL: NEGATIVE
CTP QC/QA: YES

## 2018-03-15 PROCEDURE — 99283 EMERGENCY DEPT VISIT LOW MDM: CPT | Mod: 25

## 2018-03-15 PROCEDURE — 81025 URINE PREGNANCY TEST: CPT | Performed by: PHYSICIAN ASSISTANT

## 2018-03-16 NOTE — ED NOTES
Pt presents with c/o lower abdominal cramping and breakthrough bleeding. Pt  Reports just starting new birthcontrol a month ago, only supposed to have periods q 3 months. Pts last period was February.

## 2018-03-16 NOTE — ED PROVIDER NOTES
Encounter Date: 3/15/2018    SCRIBE #1 NOTE: I, Tran Stanton, am scribing for, and in the presence of, Dr. Cummings.       History     Chief Complaint   Patient presents with    Vaginal Bleeding     with cramps, LMP, nornal and first week of February, bleeding X 3 days     Time seen by provider: 9:19 PM    This is a 27 y.o. female who presents with complaint of vaginal bleeding with lower abdominal cramping that began two days ago. Patient reports her last menstrual period was in early February, and then she started a new oral contraceptive. Patient expected her next menstrual period to start in May. She states she normally does not have cramping with menstruation prior to starting new oral contraceptive. She denies chest pain, dyspnea, dizziness/lightheadedness, nausea, vomiting, dysuria, urinary urgency, urinary frequency, or vaginal discharge.  She denies taking medications for symptoms prior to arrival.        The history is provided by the patient.     Review of patient's allergies indicates:  No Known Allergies  Past Medical History:   Diagnosis Date    Abnormal Pap smear 1/3/2013    Allergy     Panic attacks      No past surgical history on file.  Family History   Problem Relation Age of Onset    Hypertension Father     Diabetes Father     Heart attacks under age 50 Father     Hypertension Maternal Grandmother     Hypertension Paternal Grandmother     Hypertension Paternal Grandfather     Diabetes Paternal Grandfather     Heart attack Paternal Grandfather     Breast cancer Neg Hx     Ovarian cancer Neg Hx     Melanoma Neg Hx      Social History   Substance Use Topics    Smoking status: Never Smoker    Smokeless tobacco: Never Used    Alcohol use Yes      Comment: socially     Review of Systems   Constitutional: Negative for fever.   HENT: Negative for sore throat.    Respiratory: Negative for shortness of breath.    Cardiovascular: Negative for chest pain.   Gastrointestinal: Negative for  nausea and vomiting.   Genitourinary: Positive for pelvic pain and vaginal bleeding. Negative for dysuria, frequency, urgency and vaginal discharge.   Musculoskeletal: Negative for back pain.   Skin: Negative for rash.   Neurological: Negative for weakness.   Hematological: Does not bruise/bleed easily.       Physical Exam     Initial Vitals [03/15/18 1955]   BP Pulse Resp Temp SpO2   (!) 149/85 98 18 98.9 °F (37.2 °C) --      MAP       106.33         Physical Exam    Nursing note and vitals reviewed.  Constitutional: She appears well-developed and well-nourished. She is not diaphoretic. No distress.   HENT:   Head: Normocephalic and atraumatic.   Right Ear: External ear normal.   Left Ear: External ear normal.   Eyes: EOM are normal. Right eye exhibits no discharge. Left eye exhibits no discharge.   Neck: Normal range of motion.   Cardiovascular: Normal rate, regular rhythm and normal heart sounds. Exam reveals no gallop and no friction rub.    No murmur heard.  Pulmonary/Chest: Breath sounds normal. No respiratory distress. She has no wheezes. She has no rhonchi. She has no rales.   Abdominal: Soft. There is no tenderness. There is no rebound and no guarding.   Musculoskeletal: Normal range of motion. She exhibits no edema or tenderness.   Neurological: She is alert and oriented to person, place, and time.   Skin: Skin is warm and dry. No rash and no abscess noted. No erythema. No pallor.   Psychiatric: She has a normal mood and affect. Her behavior is normal. Judgment and thought content normal.         ED Course   Procedures  Labs Reviewed   POCT URINE PREGNANCY             Medical Decision Making:   Clinical Tests:   Lab Tests: Ordered and Reviewed    Additional MDM:   Comments: 28 y/o female with ? Irregular vaginal bleeding in the setting of starting a new OCP last month.  She denies any other sx except for lower abd cramping.  VS WNL.  UPT neg.  Bleeding likely related to starting of new OCP.  Patient was  reassured and instructed to f/u with her Gyn if bleeding persists.  No need for emergent w/u.  .            Attending Attestation:           Physician Attestation for Scribe:  Physician Attestation Statement for Scribe #1: I, Dr. Cummings, reviewed documentation, as scribed by Tran Stanton in my presence, and it is both accurate and complete.                    Clinical Impression:     1. Dysfunctional uterine bleeding                                 Alessia Cummings MD  03/15/18 [id number]

## 2018-03-16 NOTE — ED NOTES
Patient given a gown and sheet and asked to undress for provider examination.  Patient verbalized understanding.

## 2018-05-23 RX ORDER — PHENTERMINE HYDROCHLORIDE 37.5 MG/1
TABLET ORAL
Qty: 30 TABLET | Refills: 0 | Status: SHIPPED | OUTPATIENT
Start: 2018-05-23 | End: 2018-06-06

## 2018-05-23 NOTE — TELEPHONE ENCOUNTER
----- Message from Jenifer Medina sent at 5/23/2018 10:42 AM CDT -----  Contact: [facility]  Pharmacy Calling    Name of Caller:  Pharmacy Name:[facility]  Prescription Name:phentermine (ADIPEX-P) 37.5 mg tablet  What do they need to clarify?: checking status of refill   Communication Preference (MyChart response to Pt. (or) Call Back # and timeframe):  [phone]   Additional Information:checking status of refill phentermine (ADIPEX-P) 37.5 mg tablet

## 2018-06-06 ENCOUNTER — OFFICE VISIT (OUTPATIENT)
Dept: BARIATRICS | Facility: CLINIC | Age: 28
End: 2018-06-06
Payer: COMMERCIAL

## 2018-06-06 VITALS
HEIGHT: 65 IN | HEART RATE: 90 BPM | BODY MASS INDEX: 37.03 KG/M2 | DIASTOLIC BLOOD PRESSURE: 70 MMHG | SYSTOLIC BLOOD PRESSURE: 120 MMHG | WEIGHT: 222.25 LBS

## 2018-06-06 DIAGNOSIS — E66.01 SEVERE OBESITY (BMI 35.0-39.9): Primary | ICD-10-CM

## 2018-06-06 PROCEDURE — 99999 PR PBB SHADOW E&M-EST. PATIENT-LVL III: CPT | Mod: PBBFAC,,, | Performed by: INTERNAL MEDICINE

## 2018-06-06 PROCEDURE — 99213 OFFICE O/P EST LOW 20 MIN: CPT | Mod: S$GLB,,, | Performed by: INTERNAL MEDICINE

## 2018-06-06 RX ORDER — DIETHYLPROPION HYDROCHLORIDE 75 MG/1
75 TABLET, EXTENDED RELEASE ORAL DAILY
Qty: 30 TABLET | Refills: 2 | Status: SHIPPED | OUTPATIENT
Start: 2018-06-06 | End: 2019-03-28

## 2018-06-06 NOTE — PROGRESS NOTES
"Subjective:       Patient ID: Carie Sun is a 27 y.o. female.    Chief Complaint: Follow-up    Pt here today for follow-up. Has lost only 14 lbs, net neg16 lbs. Filled phentermine last 5/23/18. Was a little jittery at first, but that past. She is exercising. Working to be more consistent with eating habits. Of note, went to ED with Panic attack. States she was stressed at end of school year.       Review of Systems   Constitutional: Negative for chills and fever.   Respiratory: Negative for shortness of breath.         + snores.    Cardiovascular: Negative for chest pain and leg swelling.   Gastrointestinal: Negative for constipation and diarrhea.        Denies GERD   Genitourinary: Negative for difficulty urinating and menstrual problem.   Musculoskeletal: Positive for arthralgias. Negative for back pain.        Ankle   Neurological: Negative for dizziness and light-headedness.   Psychiatric/Behavioral: Negative for dysphoric mood. The patient is nervous/anxious.        Objective:     /70   Pulse 90   Ht 5' 5" (1.651 m)   Wt 100.8 kg (222 lb 3.6 oz)   BMI 36.98 kg/m²     Physical Exam   Constitutional: She is oriented to person, place, and time. She appears well-developed. No distress.   Obese   HENT:   Head: Normocephalic and atraumatic.   Eyes: EOM are normal. Pupils are equal, round, and reactive to light. No scleral icterus.   Neck: Normal range of motion. Neck supple.   Cardiovascular: Normal rate and normal heart sounds.  Exam reveals no gallop and no friction rub.    No murmur heard.  Pulmonary/Chest: Effort normal and breath sounds normal. No respiratory distress. She has no wheezes.   Musculoskeletal: Normal range of motion. She exhibits no edema.   Neurological: She is alert and oriented to person, place, and time. No cranial nerve deficit.   Skin: Skin is warm and dry. No erythema.   Psychiatric: She has a normal mood and affect. Her behavior is normal. Judgment normal.   Vitals " reviewed.      Assessment:       1. Severe obesity (BMI 35.0-39.9)        Plan:         1. Severe obesity (BMI 35.0-39.9)        Patient warned of common side effects of diethylpropion including anxiety, insomnia, palpitations and increased blood pressure. It was also explained that it is for short-term usage along with diet and exercise, and that stopping the medication without making lifestyle changes will result in regain of weight. Patient states understanding.    Weight loss medications are controlled substances.  They require routine follow up. Prescription or pills that are lost or destroyed will not be replaced.      3 meals a day made up of the following:  Unlimited green vegetables, tomatoes, mushrooms, spaghetti squash, cauliflower, meat, poultry, seafood, eggs and hard cheeses.   Milk and plain yogurt  Dressings, seasonings, condiments, etc should have less than 2 g sugars.   Beans (1-1.5 cups) or nuts (1/4 cup) can have 1 x a day.   1-2 servings of citrus fruits, berries, pineapple or melon a day (1/2 cup)  Avoid fried foods    No grains, rice, pasta, potatoes, bread, corn, peas, oatmeal, grits, tortillas, crackers, chips    No soda, sweet tea, juices or lemonade    [email or web address] for recipes. Moderate carb intake      Spring recipes given.

## 2018-06-06 NOTE — PATIENT INSTRUCTIONS
"Patient warned of common side effects of diethylpropion including anxiety, insomnia, palpitations and increased blood pressure. It was also explained that it is for short-term usage along with diet and exercise, and that stopping the medication without making lifestyle changes will result in regain of weight. Patient states understanding.    Weight loss medications are controlled substances.  They require routine follow up. Prescription or pills that are lost or destroyed will not be replaced.      3 meals a day made up of the following:  Unlimited green vegetables, tomatoes, mushrooms, spaghetti squash, cauliflower, meat, poultry, seafood, eggs and hard cheeses.   Milk and plain yogurt  Dressings, seasonings, condiments, etc should have less than 2 g sugars.   Beans (1-1.5 cups) or nuts (1/4 cup) can have 1 x a day.   1-2 servings of citrus fruits, berries, pineapple or melon a day (1/2 cup)  Avoid fried foods    No grains, rice, pasta, potatoes, bread, corn, peas, oatmeal, grits, tortillas, crackers, chips    No soda, sweet tea, juices or lemonade    [email or web address] for recipes. Moderate carb intake        Spring Recipes:    Belford Shake:     Ingredients  ½ cup low fat cottage cheese  ¼ cup vanilla protein powder  1/8 tsp mint extract  2-3 packets of stevia or artificial sweetener of choice  5-10 ice cubes (more or less depending on how thick you would like it)  4 oz of water  2-3 drops of green food coloring OR a small handful of spinach to make it green    Put all ingredients in  and  until desired consistency.      "Unstuffed" Cabbage Rolls:    Ingredients:  1 1/2 to 2 pounds lean ground beef or turkey  1 large onion, chopped  1 clove garlic, minced  1 small cabbage, chopped  2 cans (14.5 ounces each) diced tomatoes  1 can (8 ounces) tomato sauce  ¼ - ½ cup water depending on desired consistency  1 teaspoon ground black pepper, salt to taste    Spray large skillet with nonstick cookspray. " Heat pan on medium heat. Sauté the onions until tender, and then add the ground beef (or turkey) and cook until the meat is browned.    Add the garlic, cook an additional minute before adding the remaining ingredients. Cover, reduce the heat and simmer about 25 minutes (or until the cabbage is  fork tender)        Not so Phillip's Pie:    Ingredients  2 (or more) pounds of lean ground beef, or turkey  2 heads of cauliflower or 3 bags of frozen cauliflower, chopped  1 bag of frozen mixed veggies          (NO Corn, Peas or Potatoes!)  1-2 onions  1 egg  1 teaspoon each of basil, garlic powder, pepper, oregano and a little cayenne  4-5 sprays of I cant believe its not butter spray  4 ounces of fat free cream cheese (optional)  Low fat Cheese to top (optional)    Roast cauliflower in oven on 350* F for about 15-20 minutes, until browned and fork tender. (begin carballo meat while cauliflower is cooking). Place cooked cauliflower in . Add 4 ounces of fat free cream cheese, 4-5 sprays of I cant believe its not butter SPRAY, and spices and puree until creamy.     While cauliflower is roasting, brown meat in large skillet and season to taste. Set aside. Sauté diced onion in skillet until somewhat soft. Set aside with meat. Pour mixed veggies in the skillet to heat on low heat.     Mix the meat, onions, mixed veggies, raw egg and any additional seasonings and put in bottom of 9x13 baking dish. Spread mashed cauliflower mixture over it until smooth.    Bake at 350 for approximately 30 minutes. Add cheese and bake 5 additional minutes (optional). Serve warm (or reheat later).    Crock Pot Balsamic Pork Tenderloin:    Ingredients:  1 tsp dried thyme  1 tsp ground pepper  ½ tsp salt  3 tbsp dried minced onion  2 tsp dried basil  ¼ cup low sodium broth  ½ cup balsamic vinegar  2 cloves garlic, minced  2 lbs Pork Tenderloin    Mix first 5 ingredients together. Rub pork tenderloin with dry seasoning mixture.  In a large  sauté pan, heat ¼ cup balsamic vinegar and garlic on medium heat. Add pork to sauté neri and sear, carballo all sides. Add low sodium broth, 1 tbsp at a time to keep tenderloin from sticking or use nonstick cookspray.     Transfer meat to crock pot. Pour remaining balsamic vinegar into sauté pan and continue  to stir for a few minutes to deglaze the pan. Pour juices over the top of the tenderloin in crockpot. Cook on high for 3 hours or until meat thermometer says 170*. Let rest 5-10 minutes and then slice.       Side Dish: Steamed carrots w/ garlic and oanh    Ingredients:  2 garlic cloves, minced  1 lb baby carrots  5-6 sprays of I cant believe its not butter SPRAY  1 tsp minced peeled fresh oanh  1 tbsp chopped fresh cilantro  ½ tsp grated lime rind  1 tbsp fresh lime juice   ¼ tsp salt    Prepare garlic; let stand 10 minutes. Steam carrots, covered in pot, about 10 minutes or until tender.     In a nonstick skillet over medium heat, spray pan w/ I cant believe its not butter SPRAY. Add garlic and oanh and cook 1 minute, stirring constantly. Remove from  heat; stir in carrots, cilantro and remaining ingredients.         Side Dish: Green Bean Almondine    Ingredients:  1 pound fresh green beans, trimmed  1 tbsp edith vinegar  1 ½ tsp water  1 tsp Gary Mustard  ¼ tsp salt  ¼ tsp freshly ground black pepper  1 tbsp sliced almonds, toasted    Cook green beans in boiling water for 4 minutes or until crisp-tender. Drain and plunge beans into ice water. Drain well. Pat beans dry w/ paper towel.     Combine vinegar, water, mustard, salt and pepper in a medium bowl. Add beans to vinegar mixture; toss to coat well. Sprinkle with toasted almonds.      How to Cook the Perfect Hard Boiled Egg:    Steam in water: Fill a large pot with 1 inch of water. Place steamer insert inside, cover, and bring to a boil over high heat. Add eggs to steamer basket, cover, and continue cooking 12 minute. If serving cold, immediately  place eggs in a bowl of ice water and allow to cool for at least 15 minutes before peeling under cool running water. Store in the refrigerator for up to 5 days.    OR    Purchase Dash Go Rapid Egg Boiler: available @ [employer], [employer], [employer], [employer] for ~$15-$20      Classic Egg Salad Recipe:    Ingredients:  8 hard cooked eggs, peeled and coarsely chopped  4-6 tbsp of low fat or fat free moctezuma  2 tbsp celery, chopped  2 tsp chantal mustard  Dash of cayenne pepper (for spice)  Black pepper, salt to taste    In a medium bowl, combine moctezuma, celery, mustard and cayenne pepper with chopped eggs. Season w/ pepper and salt. Serve over Lettuce leaves.     Get Creative! Add chopped turkey or[place] and tomato and serve on lettuce leaves for an egg-cellent BLT egg salad or mix lean diced ham, low fat cheddar and tomatoes for  egg salad    Tuna Deviled Eggs:    Ingredients:  12 hard boiled eggs  1 can of tuna packed in water  1 rib celery, diced  ¼ cup low fat moctezuma  1 tsp mustard  Garlic powder, black pepper to taste  Dash of paprika (for finish)    Cut eggs in half lengthwise. Remove yolk and mash in bowl. In separate bowl, mix tuna, celery, low fat moctezuma, mustard and seasonings.  Stir in egg yolks until blended. Stuff eggs and sprinkle dash of paprika      Orlando Jalapeno Deviled Eggs:    Ingredients:  12 hard boiled eggs, peeled  ½ cup low fat moctezuma  1 ½ tsp rice vinegar  ¾ tsp ground mustard  ½ packet splenda  2 jalapenos, seeded and chopped, 6 pieces turkey orlando, cooked crisp and crumbled  Dash of paprika (for finish)    Cut eggs in half lengthwise. Remove yolk and mash in bowl. Add moctezuma, vinegar, spices and Splenda until well combined. Mix in jalapenos and orlando. Stuff eggs and sprinkle w/ dash of paprika      Sugar-Free High Protein Brownie Recipe:    Ingredients:  2 cups of pureed zucchini  4 oz fat free cream cheese  1 large egg  2 scoops chocolate protein powder  1 tbsp pure vanilla extract  1/3 cup  unsweetened cocoa powder    ¼ tsp salt  2 tbsp chopped nuts  *8-9 packets of splenda or artificial sweetener of choice    Preheat oven to 350* F.     Line an 8x8 pan w/ parchment paper or spray with nonstick cookspray.     In a medium bowl, blend the fat free cream cheese with egg until creamy. Add chocolate protein powder and cocoa powder until creamy. Add vanilla extract. Stir in pureed zucchini and salt.     The batter will be thick, but not dry. If batter seems dry, add 1-2 tbsp water. Fold in Nuts. Pour batter in prepared pan.     Bake for 30 minutes. Allow to cool completely. Cut into squares and chill to semi-set.     *best if eaten within 2 days but may last 3-4 days in fridge.         Lemon Whip:    Ingredients:  Small box of sugar-free vanilla instant pudding mix  1 small packet of crystal light lemonade mix  2 cups skim milk or fat free fairlife milk  Sugar-free, fat free cool whip     Make sugar-free pudding using 2 cups skim milk according to package. Stir in 1 small packet of crystal light lemonade mix.  Fold in a dollop of sugar-free, fat free cool whip and stir to combine.     Home-made Sugar-free Pudding Pops:    Ingredients:  1 small pkg of sugar-free instant pudding mix (flavor of choice!)  2 cups fat free milk     Beat ingredients with whisk for 2 minutes. Pour into 6 paper or plastic cups or into a popsicle mold. Insert wooden pop stick in center of each cup.     Freeze for 5 hours or until firm. Peel off paper or pull out of popsicle mold.     Variations: add sugar-free syrups to change up the flavor, such as sugar-free chocolate pudding + sugar free raspberry syrup = chocolate raspberry fudgesicle.

## 2018-11-30 DIAGNOSIS — F41.9 ANXIETY: ICD-10-CM

## 2018-11-30 RX ORDER — CITALOPRAM 10 MG/1
10 TABLET ORAL DAILY
Qty: 30 TABLET | Refills: 3 | Status: SHIPPED | OUTPATIENT
Start: 2018-11-30 | End: 2019-02-28 | Stop reason: SDUPTHER

## 2019-02-23 DIAGNOSIS — Z30.011 ENCOUNTER FOR INITIAL PRESCRIPTION OF CONTRACEPTIVE PILLS: ICD-10-CM

## 2019-02-25 RX ORDER — LEVONORGESTREL AND ETHINYL ESTRADIOL 150-30(84)
KIT ORAL
Qty: 91 EACH | Refills: 3 | Status: SHIPPED | OUTPATIENT
Start: 2019-02-25 | End: 2020-02-05 | Stop reason: SDUPTHER

## 2019-02-28 DIAGNOSIS — F41.9 ANXIETY: ICD-10-CM

## 2019-02-28 RX ORDER — CITALOPRAM 10 MG/1
10 TABLET ORAL DAILY
Qty: 30 TABLET | Refills: 0 | Status: SHIPPED | OUTPATIENT
Start: 2019-02-28 | End: 2019-03-15 | Stop reason: SDUPTHER

## 2019-02-28 NOTE — TELEPHONE ENCOUNTER
Please clarify with patient if she is seeing me as her PCP or going to Dr. Schneider.  If she is following with me, she is coming due for a follow-up appointment.  One refill done.

## 2019-02-28 NOTE — TELEPHONE ENCOUNTER
vm left for patient to notify office if she is being followed by DR Martinez or Dr Schneider. If Dr Martinez is to follow, instructed to schedule follow up visit in next month.

## 2019-03-15 DIAGNOSIS — F41.9 ANXIETY: ICD-10-CM

## 2019-03-15 RX ORDER — CITALOPRAM 10 MG/1
10 TABLET ORAL DAILY
Qty: 30 TABLET | Refills: 0 | Status: SHIPPED | OUTPATIENT
Start: 2019-03-15 | End: 2020-02-05 | Stop reason: SDUPTHER

## 2019-03-15 NOTE — TELEPHONE ENCOUNTER
----- Message from Kaley Boyer sent at 3/15/2019  9:59 AM CDT -----  Contact: patient [phone]  Pt had a message from Heide that she needs  to get celexa from her former doctor, Dr.Sheldon Schneider.. He was her pediatrician and she has not seen him since she was a child. Please call for further information.  only gave her a 1 month refill but he is the one who has been refilling this rx.

## 2019-03-15 NOTE — TELEPHONE ENCOUNTER
Patient states Dr Schneider was pediatrician that saw as child. States Dr Martinez is her PCP. Instructed she will need to schedule appt with Dr Martinez but refill submitted to Dr Martinez. Patient will look at school schedule and book appt online through [facility] in next few weeks

## 2019-03-28 ENCOUNTER — OFFICE VISIT (OUTPATIENT)
Dept: BARIATRICS | Facility: CLINIC | Age: 29
End: 2019-03-28
Payer: COMMERCIAL

## 2019-03-28 VITALS
WEIGHT: 234.13 LBS | BODY MASS INDEX: 39.01 KG/M2 | HEIGHT: 65 IN | HEART RATE: 80 BPM | DIASTOLIC BLOOD PRESSURE: 64 MMHG | SYSTOLIC BLOOD PRESSURE: 110 MMHG

## 2019-03-28 DIAGNOSIS — E66.01 CLASS 2 SEVERE OBESITY WITH BODY MASS INDEX (BMI) OF 35 TO 39.9 WITH SERIOUS COMORBIDITY: Primary | ICD-10-CM

## 2019-03-28 PROCEDURE — 3008F PR BODY MASS INDEX (BMI) DOCUMENTED: ICD-10-PCS | Mod: CPTII,S$GLB,, | Performed by: INTERNAL MEDICINE

## 2019-03-28 PROCEDURE — 3078F PR MOST RECENT DIASTOLIC BLOOD PRESSURE < 80 MM HG: ICD-10-PCS | Mod: CPTII,S$GLB,, | Performed by: INTERNAL MEDICINE

## 2019-03-28 PROCEDURE — 3078F DIAST BP <80 MM HG: CPT | Mod: CPTII,S$GLB,, | Performed by: INTERNAL MEDICINE

## 2019-03-28 PROCEDURE — 99999 PR PBB SHADOW E&M-EST. PATIENT-LVL IV: CPT | Mod: PBBFAC,,, | Performed by: INTERNAL MEDICINE

## 2019-03-28 PROCEDURE — 99999 PR PBB SHADOW E&M-EST. PATIENT-LVL IV: ICD-10-PCS | Mod: PBBFAC,,, | Performed by: INTERNAL MEDICINE

## 2019-03-28 PROCEDURE — 3074F SYST BP LT 130 MM HG: CPT | Mod: CPTII,S$GLB,, | Performed by: INTERNAL MEDICINE

## 2019-03-28 PROCEDURE — 3074F PR MOST RECENT SYSTOLIC BLOOD PRESSURE < 130 MM HG: ICD-10-PCS | Mod: CPTII,S$GLB,, | Performed by: INTERNAL MEDICINE

## 2019-03-28 PROCEDURE — 99213 OFFICE O/P EST LOW 20 MIN: CPT | Mod: S$GLB,,, | Performed by: INTERNAL MEDICINE

## 2019-03-28 PROCEDURE — 3008F BODY MASS INDEX DOCD: CPT | Mod: CPTII,S$GLB,, | Performed by: INTERNAL MEDICINE

## 2019-03-28 PROCEDURE — 99213 PR OFFICE/OUTPT VISIT, EST, LEVL III, 20-29 MIN: ICD-10-PCS | Mod: S$GLB,,, | Performed by: INTERNAL MEDICINE

## 2019-03-28 RX ORDER — PHENTERMINE HYDROCHLORIDE 37.5 MG/1
37.5 TABLET ORAL
Qty: 30 TABLET | Refills: 2 | Status: SHIPPED | OUTPATIENT
Start: 2019-03-28 | End: 2019-04-27

## 2019-03-28 NOTE — PATIENT INSTRUCTIONS
Patient warned of common side effects of phentermine including anxiety, insomnia, palpitations and increased blood pressure. It was also explained that it is for short-term usage along with diet and exercise, and that stopping the medication without making lifestyle changes will result in regain of weight. Patient states understanding.     Weight loss medications are controlled substances.  They require routine follow up. Prescription or pills that are lost or destroyed will not be replaced.         Start phentermine with 1/2 pill a day for at least 1 week to see if that will control your appetite.  Go up to a full pill when needed.       1200 calories a day  40% protein (120 grams)  30% carbs (90 grams)  30 % fat (40 grams)  Food diary- Intr[employer] Jame (code 32230), lose it.     Exercise- 45 min 5 times a week.     Sample meal plan  80-120g protein; 7273-8041 calories  Protein drinks and bars: 0-4 grams sugar  Drink lots of water throughout the day and exercise!  MENU # 1  Breakfast: 2 eggs, 1 turkey sausage josé  Lunch: 2-3 roll-ups (sliced turkey, low-fat slice cheese), baby carrots dipped in 1 Tbsp natural peanut butter  Mid-Day Snack: ¼ cup unsalted almonds, ½ cup fruit  Supper: 1 chicken thigh simmered in tomato sauce + 2 Tbsp mozzarella cheese, ½ cup black beans, 1/2 cup steamed veggies  Evening Snack: 1/2 cup grapes with 4 cubes low-fat cheddar cheese   ___________________________________________________  MENU # 2  Breakfast: protein drink  Mid-morning snack : ¼ cup unsalted nuts  Lunch: 1 cup tuna or chicken salad made with light moctezuma, over salad.   Supper: hamburger josé, slice of cheese, 1 cup steamed veggies.   Snack: light yogurt      Menu #3  Breakfast: 6oz plain Greek yogurt + fruit (½ banana, ½ cup fruit - pineapple, blueberries, strawberries, peach), may add Splenda to edgardo.  Lunch: ½  chicken breast w/ slice pepper filemon cheese, 1/2 cup steamed veggies and small salad with Salad Spritzer.     Mid-Day snack: protein drink   Supper: 4oz Grilled fish, grilled veggie kabob ( mushrooms, onion, bell pepper, yellow squash, zucchini, cherry tomatoes)  Evening Snack: fudgsicle no-sugar-added    Menu # 4  Breakfast: vanilla iced coffee: 1 scoop vanilla protein powder + 4oz skim milk + 4oz coffee   Snack: protein bar  Lunch: 2 Lettuce tacos: ¼ cup seasoned ground turkey wrapped in a Tre lettuce leaf with 1 Tbsp shredded cheese and dollop low-fat sour cream  Dinner: Shrimp omelet: 2 eggs, ½ cup shrimp, green onions, and shredded cheese        Menu #5  Breakfast: 1 cup low-fat cottage cheese, ½ cup peaches (no sugar added)  Lunch: 2 oz baked pork chop, 1 cup okra and tomato stew  Snack: 1 cup black beans + salsa + dollop sour cream  Dinner: Caprese chicken salad: 2 oz chicken, 1oz fresh mozzarella, sliced tomato, 1 Tbsp olive oil, basil  Snack: sugar-free pudding cup      Menu #6  Breakfast: ½ cup part-skim ricotta cheese, 2 Tbsp sugar-free strawberry preserves, ¼ cup slivered almonds  Lunch: 2 oz canned chicken, 1oz shredded cheddar cheese, ½ cup black beans, 2 Tbsp salsa  Snack: Protein drink  Dinner: 4 oz grilled salmon steak, over mixed green salad with light dressing

## 2019-03-28 NOTE — PROGRESS NOTES
"Subjective:       Patient ID: Carie Sun is a 28 y.o. female.    Chief Complaint: Follow-up    Pt here today for follow-up. Has gained 12 lbs since last in in June, net neg 4 lbs. Filled diethylpropion last 8/18/18. She states she took it for about 3 weeks. Was making her nauseous.   Pts effort has been variable in the past. States shehas been following eating plan and exercising, but just does not know why she is gaining weight. But does say she eats a lot.     Review of Systems   Constitutional: Negative for chills and fever.   Respiratory: Negative for shortness of breath.         + snores.    Cardiovascular: Negative for chest pain and leg swelling.   Gastrointestinal: Negative for constipation and diarrhea.        Denies GERD   Genitourinary: Negative for difficulty urinating and menstrual problem.   Musculoskeletal: Positive for arthralgias. Negative for back pain.        Ankle   Neurological: Negative for dizziness and light-headedness.   Psychiatric/Behavioral: Negative for dysphoric mood. The patient is nervous/anxious.        Objective:     /64   Pulse 80   Ht 5' 5" (1.651 m)   Wt 106.2 kg (234 lb 2.1 oz)   BMI 38.96 kg/m²     Physical Exam   Constitutional: She is oriented to person, place, and time. She appears well-developed. No distress.   Obese   HENT:   Head: Normocephalic and atraumatic.   Eyes: Pupils are equal, round, and reactive to light. EOM are normal. No scleral icterus.   Neck: Normal range of motion. Neck supple.   Cardiovascular: Normal rate and normal heart sounds. Exam reveals no gallop and no friction rub.   No murmur heard.  Pulmonary/Chest: Effort normal and breath sounds normal. No respiratory distress. She has no wheezes.   Musculoskeletal: Normal range of motion. She exhibits no edema.   Neurological: She is alert and oriented to person, place, and time. No cranial nerve deficit.   Skin: Skin is warm and dry. No erythema.   Psychiatric: She has a normal mood and " affect. Her behavior is normal. Judgment normal.   Vitals reviewed.      Assessment:       1. Class 2 severe obesity with body mass index (BMI) of 35 to 39.9 with serious comorbidity        Plan:         Carie was seen today for follow-up.    Diagnoses and all orders for this visit:    Class 2 severe obesity with body mass index (BMI) of 35 to 39.9 with serious comorbidity  -     phentermine (ADIPEX-P) 37.5 mg tablet; Take 1 tablet (37.5 mg total) by mouth before breakfast.          Patient warned of common side effects of phentermine including anxiety, insomnia, palpitations and increased blood pressure. It was also explained that it is for short-term usage along with diet and exercise, and that stopping the medication without making lifestyle changes will result in regain of weight. Patient states understanding.     Weight loss medications are controlled substances.  They require routine follow up. Prescription or pills that are lost or destroyed will not be replaced.         Start phentermine with 1/2 pill a day for at least 1 week to see if that will control your appetite.  Go up to a full pill when needed.       1200 calories a day  40% protein (120 grams)  30% carbs (90 grams)  30 % fat (40 grams)  Food diary- [employer] (code [id number]), lose it.     Exercise- 45 min 5 times a week.       [id number] taryn diet given

## 2019-04-01 ENCOUNTER — HOSPITAL ENCOUNTER (EMERGENCY)
Facility: HOSPITAL | Age: 29
Discharge: HOME | End: 2019-04-01
Attending: EMERGENCY MEDICINE
Payer: COMMERCIAL

## 2019-04-01 VITALS
WEIGHT: 230 LBS | HEART RATE: 72 BPM | RESPIRATION RATE: 16 BRPM | HEIGHT: 65 IN | DIASTOLIC BLOOD PRESSURE: 80 MMHG | OXYGEN SATURATION: 98 % | TEMPERATURE: 99 F | SYSTOLIC BLOOD PRESSURE: 120 MMHG | BODY MASS INDEX: 38.32 KG/M2

## 2019-04-01 DIAGNOSIS — R42 LIGHTHEADED: ICD-10-CM

## 2019-04-01 DIAGNOSIS — R07.9 CHEST PAIN: Primary | ICD-10-CM

## 2019-04-01 DIAGNOSIS — Z30.41 ORAL CONTRACEPTIVE USE: ICD-10-CM

## 2019-04-01 LAB
ALBUMIN SERPL BCP-MCNC: 3.7 G/DL (ref 3.5–5.2)
ALP SERPL-CCNC: 39 U/L (ref 55–135)
ALT SERPL W/O P-5'-P-CCNC: 15 U/L (ref 10–44)
ANION GAP SERPL CALC-SCNC: 7 MMOL/L (ref 8–16)
AST SERPL-CCNC: 18 U/L (ref 10–40)
B-HCG UR QL: NEGATIVE
BASOPHILS # BLD AUTO: 0.02 K/UL (ref 0–0.2)
BASOPHILS NFR BLD: 0.6 % (ref 0–1.9)
BILIRUB SERPL-MCNC: 0.2 MG/DL (ref 0.1–1)
BUN SERPL-MCNC: 12 MG/DL (ref 6–20)
CALCIUM SERPL-MCNC: 8.9 MG/DL (ref 8.7–10.5)
CHLORIDE SERPL-SCNC: 106 MMOL/L (ref 95–110)
CO2 SERPL-SCNC: 23 MMOL/L (ref 23–29)
CREAT SERPL-MCNC: 0.9 MG/DL (ref 0.5–1.4)
CTP QC/QA: YES
D DIMER PPP IA.FEU-MCNC: 0.25 MG/L FEU
DIFFERENTIAL METHOD: ABNORMAL
EOSINOPHIL # BLD AUTO: 0.1 K/UL (ref 0–0.5)
EOSINOPHIL NFR BLD: 3 % (ref 0–8)
ERYTHROCYTE [DISTWIDTH] IN BLOOD BY AUTOMATED COUNT: 12.4 % (ref 11.5–14.5)
EST. GFR  (AFRICAN AMERICAN): >60 ML/MIN/1.73 M^2
EST. GFR  (NON AFRICAN AMERICAN): >60 ML/MIN/1.73 M^2
GLUCOSE SERPL-MCNC: 90 MG/DL (ref 70–110)
HCT VFR BLD AUTO: 35.3 % (ref 37–48.5)
HGB BLD-MCNC: 11.7 G/DL (ref 12–16)
IMM GRANULOCYTES # BLD AUTO: 0.01 K/UL (ref 0–0.04)
IMM GRANULOCYTES NFR BLD AUTO: 0.3 % (ref 0–0.5)
LYMPHOCYTES # BLD AUTO: 1.4 K/UL (ref 1–4.8)
LYMPHOCYTES NFR BLD: 39.8 % (ref 18–48)
MCH RBC QN AUTO: 30.3 PG (ref 27–31)
MCHC RBC AUTO-ENTMCNC: 33.1 G/DL (ref 32–36)
MCV RBC AUTO: 92 FL (ref 82–98)
MONOCYTES # BLD AUTO: 0.4 K/UL (ref 0.3–1)
MONOCYTES NFR BLD: 10.8 % (ref 4–15)
NEUTROPHILS # BLD AUTO: 1.7 K/UL (ref 1.8–7.7)
NEUTROPHILS NFR BLD: 45.5 % (ref 38–73)
NRBC BLD-RTO: 0 /100 WBC
PLATELET # BLD AUTO: 272 K/UL (ref 150–350)
PMV BLD AUTO: 9.3 FL (ref 9.2–12.9)
POTASSIUM SERPL-SCNC: 4 MMOL/L (ref 3.5–5.1)
PROT SERPL-MCNC: 7 G/DL (ref 6–8.4)
RBC # BLD AUTO: 3.86 M/UL (ref 4–5.4)
SODIUM SERPL-SCNC: 136 MMOL/L (ref 136–145)
TROPONIN I SERPL DL<=0.01 NG/ML-MCNC: <0.006 NG/ML (ref 0–0.03)
TSH SERPL DL<=0.005 MIU/L-ACNC: 1.04 UIU/ML (ref 0.4–4)
WBC # BLD AUTO: 3.62 K/UL (ref 3.9–12.7)

## 2019-04-01 PROCEDURE — 99284 PR EMERGENCY DEPT VISIT,LEVEL IV: ICD-10-PCS | Mod: ,,, | Performed by: PHYSICIAN ASSISTANT

## 2019-04-01 PROCEDURE — 84443 ASSAY THYROID STIM HORMONE: CPT

## 2019-04-01 PROCEDURE — 84484 ASSAY OF TROPONIN QUANT: CPT

## 2019-04-01 PROCEDURE — 93010 EKG 12-LEAD: ICD-10-PCS | Mod: ,,, | Performed by: INTERNAL MEDICINE

## 2019-04-01 PROCEDURE — 25000003 PHARM REV CODE 250: Performed by: PHYSICIAN ASSISTANT

## 2019-04-01 PROCEDURE — 99284 EMERGENCY DEPT VISIT MOD MDM: CPT | Mod: ,,, | Performed by: PHYSICIAN ASSISTANT

## 2019-04-01 PROCEDURE — 93005 ELECTROCARDIOGRAM TRACING: CPT

## 2019-04-01 PROCEDURE — 80053 COMPREHEN METABOLIC PANEL: CPT

## 2019-04-01 PROCEDURE — 81025 URINE PREGNANCY TEST: CPT | Performed by: PHYSICIAN ASSISTANT

## 2019-04-01 PROCEDURE — 85025 COMPLETE CBC W/AUTO DIFF WBC: CPT

## 2019-04-01 PROCEDURE — 99284 EMERGENCY DEPT VISIT MOD MDM: CPT

## 2019-04-01 PROCEDURE — 93010 ELECTROCARDIOGRAM REPORT: CPT | Mod: ,,, | Performed by: INTERNAL MEDICINE

## 2019-04-01 PROCEDURE — 85379 FIBRIN DEGRADATION QUANT: CPT

## 2019-04-01 RX ORDER — ACETAMINOPHEN 500 MG
1000 TABLET ORAL
Status: COMPLETED | OUTPATIENT
Start: 2019-04-01 | End: 2019-04-01

## 2019-04-01 RX ADMIN — ACETAMINOPHEN 1000 MG: 500 TABLET ORAL at 04:04

## 2019-04-01 NOTE — ED TRIAGE NOTES
"Pt c/o chest pain that began 3-4 days ago.  States it feels "tight" all the time and intermittently gets a sharp pain that radiates to neck.  States left arm "maranda went numb a little bit".   (+) SOB.    "

## 2019-04-01 NOTE — ED PROVIDER NOTES
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 Patient reports having 4 days of left upper sternum chest pain described as a constant "tightness" sensation. She reports associated intermittent spasms that radiate up the left side of her neck. Patient is a  and stands on her feet most of the day. She reports feeling lightheaded today and felt like she might pass out. She is currently on OCP. She denies fever, cough, abdominal pain, nausea, vomiting She denies history of cardiac disease, cardiac arrhythmia, recent travel or surgeries, leg pain, calf edema. Patient recently received RX for Phentermine for weight loss 3 days ago, but states she has not started the medication. Patient reports father had a heart attack in his 40's.     The history is provided by the patient.     Review of patient's allergies indicates:  No Known Allergies  Past Medical History:   Diagnosis Date    Abnormal Pap smear 1/3/2013    Allergy     Anxiety     Panic attacks      History reviewed. No pertinent surgical history.  Family History   Problem Relation Age of Onset    Hypertension Father     Diabetes Father     Heart attacks under age 50 Father     Hypertension Maternal Grandmother     Hypertension Paternal Grandmother     Hypertension Paternal Grandfather     Diabetes Paternal Grandfather     Heart attack Paternal Grandfather     Breast cancer Neg Hx     Ovarian cancer Neg Hx     Melanoma Neg Hx      Social History     Tobacco Use    Smoking status: Never Smoker    Smokeless tobacco: Never Used   Substance Use Topics    Alcohol use: Yes     Comment: socially    Drug use: No     Review of Systems   Constitutional: Negative for chills, diaphoresis and " fever.   HENT: Negative for congestion, sore throat and trouble swallowing.    Eyes: Negative for pain and redness.   Respiratory: Negative for cough and shortness of breath.    Cardiovascular: Positive for chest pain. Negative for palpitations.   Gastrointestinal: Negative for abdominal pain, diarrhea, nausea and vomiting.   Genitourinary: Negative for dysuria and hematuria.   Musculoskeletal: Negative for neck pain and neck stiffness.   Skin: Negative for wound.   Neurological: Positive for light-headedness. Negative for headaches.     Physical Exam     Initial Vitals [04/01/19 1542]   BP Pulse Resp Temp SpO2   126/81 89 18 97.7 °F (36.5 °C) 97 %      MAP       --         Physical Exam    Constitutional: She appears well-developed and well-nourished. She is not diaphoretic. No distress.   Obese female   HENT:   Head: Normocephalic and atraumatic.   Mouth/Throat: Oropharynx is clear and moist. No oropharyngeal exudate.   Eyes: EOM are normal. Pupils are equal, round, and reactive to light.   Neck: Normal range of motion.   Cardiovascular: Normal rate and regular rhythm.   Pulmonary/Chest: Breath sounds normal. No respiratory distress. She has no wheezes. She exhibits no tenderness.   Speaking in full sentences without difficulty   Abdominal: Soft. There is no tenderness.   Musculoskeletal: Normal range of motion. She exhibits no edema or tenderness.   No calf TTP or edema   Neurological: She is alert and oriented to person, place, and time. She has normal strength. No cranial nerve deficit or sensory deficit.   Skin: Skin is warm and dry.         ED Course   Procedures  Labs Reviewed   CBC W/ AUTO DIFFERENTIAL - Abnormal; Notable for the following components:       Result Value    WBC 3.62 (*)     RBC 3.86 (*)     Hemoglobin 11.7 (*)     Hematocrit 35.3 (*)     Gran # (ANC) 1.7 (*)     All other components within normal limits   COMPREHENSIVE METABOLIC PANEL - Abnormal; Notable for the following components:     Alkaline Phosphatase 39 (*)     Anion Gap 7 (*)     All other components within normal limits   TROPONIN I   TSH   D DIMER, QUANTITATIVE   POCT URINE PREGNANCY          Imaging Results    None          Medical Decision Making:   History:   Old Medical Records: I decided to obtain old medical records.  Old Records Summarized: records from clinic visits.  Clinical Tests:   Lab Tests: Ordered and Reviewed  Medical Tests: Ordered and Reviewed       APC / Resident Notes:   28 year old female with medical history of obesity, anxiety, panic attacks presenting to the ED c/o chest pain for 4 days. Reports feeling lightheaded today while standing up at work. DDx includes but not limited to pulmonary embolism, anxiety, musculoskeletal injury, thyroid disease, cardiac arrhythmia, electrolyte disturbance, medication side effect. Unable to PERC patient out due to OCP therapy. Will get cardiac labs and d-dimer. Will give tylenol for pain.    ECG shows NSR at 86 bpm. No STEMI.     Work-up shows WBC 3.6, H/H 11.7/35.3, Plt 272, CMP unremarkable, Trop <0.006, TSH 1.0, D-dimer 0.25.     No emergent findings on ED work-up today. D-dimer negative and feel PE unlikely. Do not feel troponin trending warranted as patient has been having pain for 4 days. Patient is stable for discharge with outpatient management with PCP. Patient expresses understanding and agreeable to the plan. Return to ED precautions given for new, worsening, or concerning symptoms. I have discussed the care of this patient with my supervising physician.          Attending Attestation:     Physician Attestation Statement for NP/PA:   I have conducted a face to face encounter with this patient in addition to the NP/PA, due to NP/PA Request    Other NP/PA Attestation Additions:    History of Present Illness: Chest pain/pressure left anterior chest.  Said that father she believes had a heart attack in his late 30s or early 40s.   Physical Exam: Physical exam is  unremarkable.   Medical Decision Making: Differential diagnosis includes but is not limited to pulmonary thromboembolism, anxiety, atypical presentation for acute coronary syndrome                    Clinical Impression:       ICD-10-CM ICD-9-CM   1. Chest pain R07.9 786.50   2. Oral contraceptive use Z30.41 V25.41   3. Lightheaded R42 780.4         Disposition:   Disposition: Discharged  Condition: Stable                        Ras Ramsey PA-C  04/01/19 2245

## 2019-04-01 NOTE — DISCHARGE INSTRUCTIONS
You may take over-the-counter Tylenol or Advil from the pharmacy as directed for ongoing management of your pain.    Return to the emergency room for new, worsening, or concerning symptoms.      Our goal in the emergency department is to always give you outstanding care and exceptional service. You may receive a survey by mail or e-mail in the next week regarding your experience in our ED. We would greatly appreciate your completing and returning the survey. Your feedback provides us with a way to recognize our staff who give very good care and it helps us learn how to improve when your experience was below our aspiration of excellence.

## 2019-08-12 ENCOUNTER — OFFICE VISIT (OUTPATIENT)
Dept: BARIATRICS | Facility: CLINIC | Age: 29
End: 2019-08-12
Payer: COMMERCIAL

## 2019-08-12 VITALS
BODY MASS INDEX: 40 KG/M2 | SYSTOLIC BLOOD PRESSURE: 130 MMHG | HEIGHT: 65 IN | WEIGHT: 240.06 LBS | DIASTOLIC BLOOD PRESSURE: 80 MMHG | HEART RATE: 107 BPM

## 2019-08-12 DIAGNOSIS — E66.01 CLASS 2 SEVERE OBESITY WITH BODY MASS INDEX (BMI) OF 35 TO 39.9 WITH SERIOUS COMORBIDITY: Primary | ICD-10-CM

## 2019-08-12 PROCEDURE — 3008F PR BODY MASS INDEX (BMI) DOCUMENTED: ICD-10-PCS | Mod: CPTII,S$GLB,, | Performed by: INTERNAL MEDICINE

## 2019-08-12 PROCEDURE — 3079F DIAST BP 80-89 MM HG: CPT | Mod: CPTII,S$GLB,, | Performed by: INTERNAL MEDICINE

## 2019-08-12 PROCEDURE — 3075F PR MOST RECENT SYSTOLIC BLOOD PRESS GE 130-139MM HG: ICD-10-PCS | Mod: CPTII,S$GLB,, | Performed by: INTERNAL MEDICINE

## 2019-08-12 PROCEDURE — 99999 PR PBB SHADOW E&M-EST. PATIENT-LVL IV: CPT | Mod: PBBFAC,,, | Performed by: INTERNAL MEDICINE

## 2019-08-12 PROCEDURE — 3079F PR MOST RECENT DIASTOLIC BLOOD PRESSURE 80-89 MM HG: ICD-10-PCS | Mod: CPTII,S$GLB,, | Performed by: INTERNAL MEDICINE

## 2019-08-12 PROCEDURE — 3075F SYST BP GE 130 - 139MM HG: CPT | Mod: CPTII,S$GLB,, | Performed by: INTERNAL MEDICINE

## 2019-08-12 PROCEDURE — 3008F BODY MASS INDEX DOCD: CPT | Mod: CPTII,S$GLB,, | Performed by: INTERNAL MEDICINE

## 2019-08-12 PROCEDURE — 99213 PR OFFICE/OUTPT VISIT, EST, LEVL III, 20-29 MIN: ICD-10-PCS | Mod: S$GLB,,, | Performed by: INTERNAL MEDICINE

## 2019-08-12 PROCEDURE — 99999 PR PBB SHADOW E&M-EST. PATIENT-LVL IV: ICD-10-PCS | Mod: PBBFAC,,, | Performed by: INTERNAL MEDICINE

## 2019-08-12 PROCEDURE — 99213 OFFICE O/P EST LOW 20 MIN: CPT | Mod: S$GLB,,, | Performed by: INTERNAL MEDICINE

## 2019-08-12 RX ORDER — TOPIRAMATE 25 MG/1
25 TABLET ORAL 2 TIMES DAILY
Qty: 60 TABLET | Refills: 3 | Status: SHIPPED | OUTPATIENT
Start: 2019-08-12 | End: 2020-02-05

## 2019-08-12 RX ORDER — PHENTERMINE HYDROCHLORIDE 37.5 MG/1
TABLET ORAL
Qty: 30 TABLET | Refills: 1 | Status: SHIPPED | OUTPATIENT
Start: 2019-08-12 | End: 2022-09-13

## 2019-08-12 RX ORDER — PHENTERMINE HYDROCHLORIDE 37.5 MG/1
37.5 TABLET ORAL
Refills: 2 | COMMUNITY
Start: 2019-06-29 | End: 2019-08-12 | Stop reason: SDUPTHER

## 2019-08-12 NOTE — PATIENT INSTRUCTIONS
Patient was informed that topiramate is used for migraine prevention and seizures. Weight loss is a common side effect that is well documented. S/he understands this. S/he was informed of the potential side effects such as serious and possibly fatal rash in which case the medication should be discontinued immediately. Paresthesias, forgetfulness, fatigue, kidney stones, GI symptoms, and changes in lab values such as electrolytes, blood counts and kidney function. Do not get pregnant while on this medication.     Start topiramate  in the evening for 1 week, then morning and evening.       Patient warned of common side effects of phentermine including anxiety, insomnia, palpitations and increased blood pressure. It was also explained that it is for short-term usage along with diet and exercise, and that stopping the medication without making lifestyle changes will result in regain of weight. Patient states understanding.     Weight loss medications are controlled substances.  They require routine follow up. Prescription or pills that are lost or destroyed will not be replaced.       Start phentermine with 1/2 pill a day         3 meals a day made up of the following:  Unlimited green vegetables, tomatoes, mushrooms, spaghetti squash, cauliflower, meat, poultry, seafood, eggs and hard cheeses.   Milk and plain yogurt  Dressings, seasonings, condiments, etc should have less than 2 g sugars.   Beans (1-1.5 cups) or nuts (1/4 cup) can have 1 x a day.   1-2 servings of citrus fruits, berries, pineapple or melon a day (1/2 cup)  Avoid fried foods    No grains, rice, pasta, potatoes, bread, corn, peas, oatmeal, grits, tortillas, crackers, chips    No soda, sweet tea, juices or lemonade    Www.dietdoctor.com for recipes. Moderate carb intake      January 28, 2019  Hot Spinach and Artichoke Dip (Recipe)  BY HOWARD RUIZ RD, CSSD    This creamy spinach dip can double as a protein-rich, gluten-free side dish, game day  appetizer or parade route snack.  Calories 85 calories    Fat 3 grams saturated fat    Prep Time 5 minutes  Cook Time10 minutes  Yield Serves 5-10 people  Ingredients   1/2 cup onion, finely chopped   3 (10 ounce) packs of frozen chopped spinach, thawed and squeezed dry   1 (8 ounce) package reduced-fat cream cheese   1 (8 ounce) carton fat-free plain Greek yogurt   1/2 cup Parmesan cheese, grated   1 (14 ounce) can artichoke hearts   1/4 teaspoon salt (optional)   1/4 teaspoon black pepper   1/8 teaspoon crushed red pepper flakes  Instructions  1. Lightly coat a skillet with cooking spray.  2. Cook and stir onion over medium heat until transparent (about 5 minutes).  3. Add spinach. Cook until thoroughly heated (about 1-2 minutes).  4. Reduce heat and add cream cheese. Stir until melted and smooth.  5. Stir in Greek yogurt, Parmesan cheese and artichokes. Remove from heat.  6. Season with salt (optional) and black and red pepper.  7. Serve with raw vegetables.                          January 31, 2019  Pizza Cups (Recipe)    Trying to eat better but craving pizza? These protein-packed pizza cups will do the trick.    Calories 65 calories per cup  Fat 2.7 grams per cup  Prep Time5 minutes  Cook Time 25 minutes  Yield 12 pizza cups  Ingredients   1 ½ cups liquid egg whites   2 large eggs   1 cup fat free or low moisture shredded mozzarella cheese   37 turkey pepperonis (25 chopped and 12 sliced)   ¼ cup Parmesan cheese   ¼ tsp oregano   ¼ tsp black pepper  Instructions  1. Preheat oven to 350 degrees Fahrenheit.  2. Chop up 25 turkey pepperonis into small pieces. In a bowl, combine all ingredients except the remaining 12 sliced turkey pepperoni.  3. Spray a muffin neri with nonstick spray.  4. Place a whole sliced turkey pepperoni in the bottom of each of the 12 muffin molds.  5. Pour or spoon in the mixture in your bowl into each muffin mold evenly ¾ full.  6. Bake in the oven for 20-25 minutes. Place  a toothpick in the center of the pizza cup to ensure all liquid egg has cooked. For a crispier pizza cup, leave in the oven a little longer.  7. Let cool for a few minutes before serving. Enjoy!

## 2019-08-12 NOTE — PROGRESS NOTES
"Subjective:       Patient ID: Carie Sun is a 28 y.o. female.    Chief Complaint: Follow-up    Pt here today for follow-up. Has gained 4 more lbs since last in in March, net neg 0 lbs. Filled phentermne last 6/29/19. Pts effort has been variable in the past. She did not find that tracking was working out, so went back to [facility] diet. Has been eating fried foods Not exercising regularly. She did feel like the phentermine was helpful when she took it. Has been off of it a couple of weeks.   On diethylpropion in past and states that it made her nauseous and she had weight gain.   EKG 4.1.19    Normal sinus rhythm with sinus arrhythmia  Possible Left atrial enlargement    When compared with ECG of 27-FEB-2018 07:19,  Nonspecific T wave abnormality, worse in Anterior leads    Review of Systems   Constitutional: Negative for chills and fever.   Respiratory: Negative for shortness of breath.         + snores.    Cardiovascular: Negative for chest pain and leg swelling.   Gastrointestinal: Negative for constipation and diarrhea.        Denies GERD   Genitourinary: Negative for difficulty urinating and menstrual problem.   Musculoskeletal: Positive for arthralgias. Negative for back pain.        Ankle   Neurological: Negative for dizziness and light-headedness.   Psychiatric/Behavioral: Negative for dysphoric mood. The patient is nervous/anxious.        Objective:     /80   Pulse 107   Ht 5' 5" (1.651 m)   Wt 108.9 kg (240 lb 1.3 oz)   LMP 06/11/2019   BMI 39.95 kg/m²     Physical Exam   Constitutional: She is oriented to person, place, and time. She appears well-developed. No distress.   Obese   HENT:   Head: Normocephalic and atraumatic.   Eyes: Pupils are equal, round, and reactive to light. EOM are normal. No scleral icterus.   Neck: Normal range of motion. Neck supple.   Cardiovascular: Normal rate.   Pulmonary/Chest: Effort normal.   Musculoskeletal: Normal range of motion. She exhibits no edema. "   Neurological: She is alert and oriented to person, place, and time. No cranial nerve deficit.   Skin: Skin is warm and dry. No erythema.   Psychiatric: She has a normal mood and affect. Her behavior is normal. Judgment normal.   Vitals reviewed.      Assessment:       1. Class 2 severe obesity with body mass index (BMI) of 35 to 39.9 with serious comorbidity        Plan:         Carie was seen today for follow-up.    Diagnoses and all orders for this visit:    Class 2 severe obesity with body mass index (BMI) of 35 to 39.9 with serious comorbidity  -     phentermine (ADIPEX-P) 37.5 mg tablet; 1/2 tab po daily    Other orders  -     topiramate (TOPAMAX) 25 MG tablet; Take 1 tablet (25 mg total) by mouth 2 (two) times daily.          Patient was informed that topiramate is used for migraine prevention and seizures. Weight loss is a common side effect that is well documented. S/he understands this. S/he was informed of the potential side effects such as serious and possibly fatal rash in which case the medication should be discontinued immediately. Paresthesias, forgetfulness, fatigue, kidney stones, GI symptoms, and changes in lab values such as electrolytes, blood counts and kidney function. Do not get pregnant while on this medication.     Start topiramate  in the evening for 1 week, then morning and evening.       Patient warned of common side effects of phentermine including anxiety, insomnia, palpitations and increased blood pressure. It was also explained that it is for short-term usage along with diet and exercise, and that stopping the medication without making lifestyle changes will result in regain of weight. Patient states understanding.     Weight loss medications are controlled substances.  They require routine follow up. Prescription or pills that are lost or destroyed will not be replaced.       Start phentermine with 1/2 pill a day         3 meals a day made up of the following:  Unlimited green  vegetables, tomatoes, mushrooms, spaghetti squash, cauliflower, meat, poultry, seafood, eggs and hard cheeses.   Milk and plain yogurt  Dressings, seasonings, condiments, etc should have less than 2 g sugars.   Beans (1-1.5 cups) or nuts (1/4 cup) can have 1 x a day.   1-2 servings of citrus fruits, berries, pineapple or melon a day (1/2 cup)  Avoid fried foods    No grains, rice, pasta, potatoes, bread, corn, peas, oatmeal, grits, tortillas, crackers, chips    No soda, sweet tea, juices or lemonade    [email or web address].dietdoctor.IZI-collecte for recipes. Moderate carb intake        Exercise- 45 min 5 times a week.     Alia hugo recipes given.

## 2020-02-05 ENCOUNTER — OFFICE VISIT (OUTPATIENT)
Dept: OBSTETRICS AND GYNECOLOGY | Facility: CLINIC | Age: 30
End: 2020-02-05
Payer: COMMERCIAL

## 2020-02-05 VITALS — BODY MASS INDEX: 41.51 KG/M2 | WEIGHT: 249.13 LBS | HEIGHT: 65 IN

## 2020-02-05 DIAGNOSIS — F41.9 ANXIETY: ICD-10-CM

## 2020-02-05 DIAGNOSIS — Z30.41 ENCOUNTER FOR SURVEILLANCE OF CONTRACEPTIVE PILLS: ICD-10-CM

## 2020-02-05 DIAGNOSIS — Z11.3 SCREEN FOR STD (SEXUALLY TRANSMITTED DISEASE): ICD-10-CM

## 2020-02-05 DIAGNOSIS — Z23 NEED FOR HPV VACCINATION: ICD-10-CM

## 2020-02-05 DIAGNOSIS — Z01.419 ENCOUNTER FOR GYNECOLOGICAL EXAMINATION: Primary | ICD-10-CM

## 2020-02-05 PROCEDURE — 99999 PR PBB SHADOW E&M-EST. PATIENT-LVL III: ICD-10-PCS | Mod: PBBFAC,,, | Performed by: OBSTETRICS & GYNECOLOGY

## 2020-02-05 PROCEDURE — 88175 CYTOPATH C/V AUTO FLUID REDO: CPT

## 2020-02-05 PROCEDURE — 99395 PR PREVENTIVE VISIT,EST,18-39: ICD-10-PCS | Mod: 25,S$GLB,, | Performed by: OBSTETRICS & GYNECOLOGY

## 2020-02-05 PROCEDURE — 99395 PREV VISIT EST AGE 18-39: CPT | Mod: 25,S$GLB,, | Performed by: OBSTETRICS & GYNECOLOGY

## 2020-02-05 PROCEDURE — 87491 CHLMYD TRACH DNA AMP PROBE: CPT

## 2020-02-05 PROCEDURE — 99999 PR PBB SHADOW E&M-EST. PATIENT-LVL III: CPT | Mod: PBBFAC,,, | Performed by: OBSTETRICS & GYNECOLOGY

## 2020-02-05 RX ORDER — CITALOPRAM 10 MG/1
10 TABLET ORAL DAILY
Qty: 90 TABLET | Refills: 3 | Status: SHIPPED | OUTPATIENT
Start: 2020-02-05 | End: 2021-08-23 | Stop reason: SDUPTHER

## 2020-02-05 RX ORDER — LEVONORGESTREL / ETHINYL ESTRADIOL AND ETHINYL ESTRADIOL 150-30(84)
1 KIT ORAL DAILY
Qty: 91 EACH | Refills: 3 | Status: SHIPPED | OUTPATIENT
Start: 2020-02-05 | End: 2021-02-24

## 2020-02-07 LAB
C TRACH DNA SPEC QL NAA+PROBE: NOT DETECTED
N GONORRHOEA DNA SPEC QL NAA+PROBE: NOT DETECTED

## 2020-03-02 LAB
FINAL PATHOLOGIC DIAGNOSIS: NORMAL
Lab: NORMAL

## 2020-04-30 ENCOUNTER — PATIENT MESSAGE (OUTPATIENT)
Dept: OBSTETRICS AND GYNECOLOGY | Facility: CLINIC | Age: 30
End: 2020-04-30

## 2020-10-05 ENCOUNTER — PATIENT MESSAGE (OUTPATIENT)
Dept: ADMINISTRATIVE | Facility: HOSPITAL | Age: 30
End: 2020-10-05

## 2021-01-04 ENCOUNTER — PATIENT MESSAGE (OUTPATIENT)
Dept: ADMINISTRATIVE | Facility: HOSPITAL | Age: 31
End: 2021-01-04

## 2021-02-24 DIAGNOSIS — Z30.41 ENCOUNTER FOR SURVEILLANCE OF CONTRACEPTIVE PILLS: ICD-10-CM

## 2021-02-24 RX ORDER — LEVONORGESTREL AND ETHINYL ESTRADIOL 150-30(84)
KIT ORAL
Qty: 91 EACH | Refills: 0 | Status: SHIPPED | OUTPATIENT
Start: 2021-02-24 | End: 2021-05-24

## 2021-05-24 DIAGNOSIS — Z30.41 ENCOUNTER FOR SURVEILLANCE OF CONTRACEPTIVE PILLS: ICD-10-CM

## 2021-05-24 RX ORDER — LEVONORGESTREL AND ETHINYL ESTRADIOL 150-30(84)
KIT ORAL
Qty: 84 EACH | Refills: 0 | Status: SHIPPED | OUTPATIENT
Start: 2021-05-24 | End: 2021-08-23

## 2021-08-23 ENCOUNTER — PATIENT MESSAGE (OUTPATIENT)
Dept: INTERNAL MEDICINE | Facility: CLINIC | Age: 31
End: 2021-08-23

## 2021-08-23 ENCOUNTER — PATIENT MESSAGE (OUTPATIENT)
Dept: OBSTETRICS AND GYNECOLOGY | Facility: CLINIC | Age: 31
End: 2021-08-23

## 2021-08-23 DIAGNOSIS — F41.9 ANXIETY: ICD-10-CM

## 2021-08-23 RX ORDER — CITALOPRAM 10 MG/1
10 TABLET ORAL DAILY
Qty: 90 TABLET | Refills: 0 | Status: SHIPPED | OUTPATIENT
Start: 2021-08-23 | End: 2021-11-09 | Stop reason: SDUPTHER

## 2021-08-23 RX ORDER — CITALOPRAM 10 MG/1
10 TABLET ORAL DAILY
Qty: 90 TABLET | Refills: 3 | OUTPATIENT
Start: 2021-08-23

## 2021-10-31 NOTE — PROGRESS NOTES
"Chief Complaint: Well Woman Exam, Right Nipple Pain     HPI:      Carie Sun is a 29 y.o.  who presents today for well woman exam.  LMP: Patient's last menstrual period was 2019.  C/o right nipple pain which began 3 days ago when she noticed yellow discharge around piercing site. Pt has had nipples pierced twice, most recently 2 years ago. No problems before and none since she removed the bar yesterday. Specifically, patient denies abnormal vaginal bleeding, discharge, pelvic pain, urinary problems, or changes in appetite. Ms. Sun is currently sexually active with a single male partner. She is currently using oral contraceptives (estrogen/progesterone) for contraception. She would like STD screening today.    Previous Pap:  NILM (2016)    Gardasil:has never had     Ms. Sun confirms that she wears her seatbelt when riding in the car and does not text while driving.     OB History        0    Para        Term                AB        Living           SAB        TAB        Ectopic        Multiple        Live Births                     ROS:     GENERAL: Denies unintentional weight gain or weight loss. Feeling well overall.   SKIN: Denies rash or lesions.   HEENT: Denies headaches, or vision changes.   CARDIOVASCULAR: Denies palpitations or chest pain.   RESPIRATORY: Denies shortness of breath or dyspnea on exertion.  BREASTS: Denies pain, lumps, or nipple discharge.   ABDOMEN: Denies abdominal pain, constipation, diarrhea, nausea, vomiting, change in appetite.  URINARY: Denies frequency, dysuria, hematuria.  NEUROLOGIC: Denies syncope or weakness.   PSYCHIATRIC: Denies depression, anxiety or mood swings.    Physical Exam:      PHYSICAL EXAM:  Ht 5' 5" (1.651 m)   Wt 113 kg (249 lb 1.9 oz)   LMP 2019   BMI 41.46 kg/m²   Body mass index is 41.46 kg/m².     APPEARANCE: Well nourished, well developed, in no acute distress.  PSYCH: Appropriate mood and affect.  SKIN: No " Hospitalist History and Physical   Admit Date:  10/31/2021  9:27 AM   Name:  Abigail Chou   Age:  81 y.o. Sex:  female  :  1930   MRN:  [id number]   Room:  ER01/01    Presenting Complaint: Chest Pain (Angina)    Reason(s) for Admission: Acute hypoxemic respiratory failure ([facility] [J96.01]  Acute exacerbation of CHF (congestive heart failure) ([facility] [I50.9]  Cellulitis of right leg [L03.115]     History of Present Illness:   Abigail Chou is a 81 y.o. female with medical history of  Hypertension, coronary artery disease, atrial fibrillation, diastolic CHF who was brought to the ER by EMS from [facility] because of chest heaviness. Patient stated that she was in her usual state of health until this morning when she started having chest pain. She was given sublingual nitro x2 and her chest pain improved. She had substernal chest pain with no radiation earlier this morning. Patient was given aspirin in route to the hospital.  She denies shortness of breath however oxygen levels in the ER was found to be 80 to 85% on room air intermittently dropped. No cough. She denies any nausea vomiting abdominal pain diarrhea. She complained of pain in her right leg. She did not notice any redness. No tingling numbness or weakness of extremities. No fever no chills. No sick contacts. She reports dysuria. No urgency or frequency of urination. ER course:    Patient is afebrile, hemodynamically stable, oxygen saturation initially 92% on 2 L oxygen nasal cannula. Oxygen levels were checked without the oxygen and the patient dropped to 80 to 85% on room air. EKG, troponins negative. CBC fairly unremarkable except for hemoglobin of 10.4 which is approximately baseline. CMP shows creatinine of 1.16.  proBNP 2615. Chest x-ray shows pulmonary edema with right pleural effusion. Patient was initially planned to be discharged to the [facility] from the ER today. Unfortunately her oxygen levels dropped. Arterial blood gas was done and showed PO2 of 52. Patient admitted for further evaluation management of acute hypoxemic respiratory failure. Review of Systems:  10 systems reviewed and negative except as noted in HPI. Assessment & Plan:     Principal Problem:    Acute hypoxemic respiratory failure ([facility] (10/31/2021)  Acute on chronic diastolic CHF exacerbation  Patient's oxygen saturation 80 to 85% on room air. Arterial blood gas was done and showed PO2 of 52. Chest x-ray shows pulmonary edema and right pleural effusion. IV Lasix 40 mg x 1 given in the ER. Will do IV Lasix 40 mg daily. Acute on chronic diastolic CHF exacerbation/chest pain/coronary artery disease Eliquis. Cardiology consulted. Appreciate recommendations. IV Lasix as above. Fluid restrictions. Repeat echocardiogram.    Right leg cellulitis  Patient has significant erythema warmth and tenderness of the right lower extremity. Duplex ultrasound right lower extremity ordered. IV clindamycin. Chronic atrial fibrillation  Continue home medications metoprolol, Eliquis. Hypothyroidism  Levothyroxine    Dyslipidemia  Statin    Depression  Cymbalta      Dispo/Discharge Planning: Medically, inpatient, remote telemetry. Anticipate inpatient hospital stay for 24 to 48 hours. Plan to discharge back to assisted living when medically ready. Diet: ADULT DIET Regular; Low Fat/Low Chol/High Fiber/2 gm Na; 1800 ml  VTE ppx: Eliquis  Code status: DNR DPOA patient's daughter     Ms. Shiva He phone number [phone]    Hospital Problems as of 10/31/2021 Date Reviewed: 6/22/2021        Codes Class Noted - Resolved POA    Acute exacerbation of CHF (congestive heart failure) ([facility] ICD-10-CM: I50.9  ICD-9-CM: 428.0  10/31/2021 - Present Unknown        Cellulitis of right leg ICD-10-CM: L03.115  ICD-9-CM: 682.6  10/31/2021 - Present Unknown        * (Principal) Acute hypoxemic acne or hirsutism  NECK: Neck symmetric without masses or thyromegaly  NODES: No inguinal, axillary, or supraclavicular lymph node enlargement  ABDOMEN: Soft.  No tenderness or masses.    CARDIOVASCULAR: No edema of peripheral extremities  BREASTS: Symmetrical, no skin changes or visible lesions.  No palpable masses or nipple discharge bilaterally. Left nipple with piercing in. Right nipple without piercing, no discharge or erythema.  PELVIC: Normal external genitalia without lesions.  Normal hair distribution.  Adequate perineal body, normal urethral meatus.  Vagina moist and well rugated without lesions or discharge.  Cervix pink, without lesions, discharge or tenderness.  No significant cystocele or rectocele.  Bimanual exam shows uterus to be normal size, regular, mobile and nontender.  Adnexa without masses or tenderness.      Assessment/Plan:     Encounter for gynecological examination  -     Liquid-Based Pap Smear, Screening    Screen for STD (sexually transmitted disease)  -     C. trachomatis/N. gonorrhoeae by AMP DNA    Anxiety  -     citalopram (CELEXA) 10 MG tablet; Take 1 tablet (10 mg total) by mouth once daily.  Dispense: 90 tablet; Refill: 3    Encounter for surveillance of contraceptive pills  -     L norgest/e.estradiol-e.estrad (DAYSEE) 0.15 mg-30 mcg (84)/10 mcg (7) 3MPk; Take 1 tablet by mouth once daily.  Dispense: 91 each; Refill: 3    Need for HPV vaccination  -     (In Office Administered) HPV Vaccine (9-Valent) (3 Dose) (IM)    For nipple pain - recommended sterilizing nipple bar and soak nipple with epsom salts x 10 minutes then replace piercing after drying for about 20 minutes.     Counseling:     Patient was counseled today on current ASCCP pap guidelines, the recommendation for yearly pelvic exams, healthy diet and exercise routines, breast self awareness.She is to see her PCP for other health maintenance.     Use of the [employer] Patient Portal discussed and encouraged during today's  respiratory failure ([facility] ICD-10-CM: J96.01  ICD-9-CM: 518.81  10/31/2021 - Present Unknown        Diastolic CHF, acute on chronic ([facility]) ICD-10-CM: I50.33  ICD-9-CM: 428.33, 428.0  12/25/2017 - Present Yes        Atrial fibrillation ([facility] ICD-10-CM: I48.91  ICD-9-CM: 427.31  7/13/2011 - Present Yes        Hypothyroidism (Chronic) ICD-10-CM: E03.9  ICD-9-CM: 244.9  7/7/2011 - Present Yes        Coronary atherosclerosis of native coronary artery--PTCA 1997 LAD/diag (Chronic) ICD-10-CM: I25.10  ICD-9-CM: 414.01  7/1/2011 - Present Yes        Hyperlipemia (Chronic) ICD-10-CM: E78.5  ICD-9-CM: 272.4  3/29/2009 - Present Yes              Past History:  Past Medical History:   Diagnosis Date    Anemia associated with acute blood loss 7/24/2011    Atrial fibrillation ([facility] 7/13/2011    CAD (coronary artery disease) 1997    MI, PCI , CABG 7/13/2011    Chronic diastolic heart failure ([facility] 10/29/2015    Chronic pain     Constipation- no BM since 7/30 8/5/2011    Coronary atherosclerosis of native coronary artery--PTCA 1997 LAD/diag 7/1/2011    CVA (cerebrovascular accident) ([facility] 3/29/2009    mild memory issues, rls, afib    Diastolic CHF, acute on chronic ([facility]) 12/25/2017    GI bleed 1/11/2015    HEMOTHORAX ON LEFT  7/24/2011    Hyperlipemia 3/29/2009    Hypertension 3/29/2009    Hypertension, benign 10/29/2015    Hyponatremia 7/24/2011    Hypothyroidism 7/7/2011    Leucocytosis 8/3/2011    Leukocytosis 1/11/2015    Irene-Starr tear 1/12/2015    Mitral valve insufficiency 4/2/2009    [facility]ox[facility] ([facility] 10/29/2015    Pleural effusion, bilateral 7/23/2011    Right thorocetesis 1200ml removed 7/23/11 and 1000ml removed 7/28/11 Left thorocentesis 1100ml removed 7/23/11     Pneumonia due to methicillin resistant Staphylococcus aureus ([facility] 1/17/2019    S/p thoracotomy- evauation of hemothorax 8/4/2011    Unspecified hypothyroidism 3/30/2009     Past Surgical History:   Procedure visit.              Laterality Date    HX BACK SURGERY      HX ORTHOPAEDIC  4 years ago    neck    HX ORTHOPAEDIC  10 years    back    HX OTHER SURGICAL      steroid injection neck, 2 weeks ago    SD CABG, ARTERY-VEIN, THREE  2011      Allergies   Allergen Reactions    Versed [Midazolam] Anxiety     Delirium       Social History     Tobacco Use    Smoking status: Former Smoker     Quit date:      Years since quittin.8    Smokeless tobacco: Never Used   Substance Use Topics    Alcohol use: No      Family History   Problem Relation Age of Onset    Heart Attack Father 36      Family history reviewed and negative except as otherwise noted. Immunization History   Administered Date(s) Administered    COVID-19, PFIZER, MRNA, LNP-S, PF, 30MCG/0.3ML DOSE 2021     Prior to Admit Medications:  Current Outpatient Medications   Medication Instructions    apixaban (ELIQUIS) 2.5 mg, Oral, 2 TIMES DAILY    ascorbic acid (vitamin C) (VITAMIN C) 500 mg, DAILY    buprenorphine (BUTRANS) 10 mcg/hour 1 Patch, TransDERmal, EVERY 7 DAYS    Calcium Carbonate-Vit D3-Min (CALTRATE 600+D PLUS MINERALS) 600 mg calcium- 400 unit tab Take  by mouth.  dextran 70-hypromellose (ARTIFICIAL TEARS) ophthalmic solution 2 Drops, AS NEEDED    diclofenac (VOLTAREN) 1 % gel APPLY TOPICALLY 4 (FOUR) TIMES A DAY. 100 GRAM PER AFFECTED AREA    DULoxetine (CYMBALTA) 60 mg, Oral, DAILY    ferrous sulfate 140 mg, Oral, Take 1 tab by mouth every morning and bedtime.     furosemide (LASIX) 40 mg, Oral, DAILY    gabapentin (NEURONTIN) 300 mg, Oral, 2 TIMES DAILY    gemfibroziL (LOPID) 600 mg, 2 TIMES DAILY    latanoprost (XALATAN) 0.005 % ophthalmic solution 1 Drop, Both Eyes, EVERY BEDTIME    levothyroxine (SYNTHROID) 112 mcg, Oral, DAILY BEFORE BREAKFAST    metoprolol succinate (TOPROL-XL) 25 mg, Oral, DAILY    nitroglycerin (NITROSTAT) 0.4 mg, SubLINGual, EVERY 5 MIN AS NEEDED    potassium chloride (K-DUR, KLOR-CON) 20 mEq tablet 20 mEq, Oral, 2 TIMES DAILY    rOPINIRole (REQUIP) 1 mg, Oral, 3 TIMES DAILY, 12pm - 3pm -9pm    rosuvastatin (CRESTOR) 40 mg, Oral, EVERY BEDTIME    timolol maleate 0.5 % drpd ophthalmic solution 1 Drop, DAILY       Objective:     Patient Vitals for the past 24 hrs:   Temp Pulse Resp BP SpO2   10/31/21 1532  (!) 59 11 (!) 167/70 96 %   10/31/21 1332  65 21 (!) 147/67    10/31/21 1309  (!) 56 21  95 %   10/31/21 1302  61 17 (!) 147/70    10/31/21 1253  61   95 %   10/31/21 1247  (!) 57 20  97 %   10/31/21 1244  63 16 (!) 175/74 94 %   10/31/21 1203  (!) 58 15 138/60 98 %   10/31/21 1132  (!) 59 28 (!) 151/71 99 %   10/31/21 1118    (!) 147/64    10/31/21 1116     100 %   10/31/21 1103  (!) 59 20 (!) 147/64 90 %   10/31/21 1032  (!) 56 19 118/62 100 %   10/31/21 1002  62 21 (!) 147/83 97 %   10/31/21 0935 98.2 °F (36.8 °C) 63 18 128/67 98 %   10/31/21 0931  67  128/67 92 %     Oxygen Therapy  O2 Sat (%): 96 % (10/31/21 1532)  Pulse via Oximetry: 62 beats per minute (10/31/21 1532)  O2 Device: Nasal cannula (10/31/21 1116)  O2 Flow Rate (L/min): 1 l/min (10/31/21 1116)    Estimated body mass index is 24.22 kg/m² as calculated from the following:    Height as of this encounter: 5' (1.524 m). Weight as of this encounter: 56.2 kg (124 lb). No intake or output data in the 24 hours ending 10/31/21 1557      Physical Exam:    Blood pressure (!) 167/70, pulse (!) 59, temperature 98.2 °F (36.8 °C), resp. rate 11, height 5' (1.524 m), weight 56.2 kg (124 lb), SpO2 96 %. General:    Well nourished. No overt distress on 2 L oxygen nasal cannula,  Head:  Normocephalic, atraumatic  Eyes:  Sclerae appear normal.  Pupils equally round. ENT:  Nares appear normal, no drainage. Moist oral mucosa  Neck:  No restricted ROM. Trachea midline   CV:   RRR. No m/r/g. No jugular venous distension.   Lungs:   Diminished breath sounds right lung base, basilar crackles bilaterally, no wheezing,  Abdomen: Bowel sounds present. Soft, nontender, nondistended. Extremities: No cyanosis or clubbing. Erythema, right lower extremity, tenderness  Skin:     No rashes. Warm and dry. Erythema, tenderness right lower extremity,  Neuro:  CN II-XII grossly intact. Sensation intact. A&Ox3  Psych:  Normal mood and affect. I have reviewed ordered lab tests and independently visualized imaging below:    Last 24hr Labs:  Recent Results (from the past 24 hour(s))   EKG, 12 LEAD, INITIAL    Collection Time: 10/31/21  9:40 AM   Result Value Ref Range    Ventricular Rate 63 BPM    Atrial Rate 122 BPM    QRS Duration 112 ms    Q-T Interval 402 ms    QTC Calculation (Bezet) 411 ms    Calculated R Axis 59 degrees    Calculated T Axis 84 degrees    Diagnosis       !! AGE AND GENDER SPECIFIC ECG ANALYSIS !! Atrial fibrillation  Low voltage QRS  Cannot rule out Anteroseptal infarct , age undetermined  Abnormal ECG  When compared with ECG of 06-FEB-2018 13:07,  Atrial fibrillation has replaced Sinus rhythm    Confirmed by ST GUILLERMO READ MD (), DANIEL LARIOS ([id number]) on 10/31/2021 12:44:24 PM     CBC WITH AUTOMATED DIFF    Collection Time: 10/31/21  9:58 AM   Result Value Ref Range    WBC 6.9 4.3 - 11.1 K/uL    RBC 3.16 (L) 4.05 - 5.2 M/uL    HGB 10.4 (L) 11.7 - 15.4 g/dL    HCT 32.9 (L) 35.8 - 46.3 %    .1 (H) 79.6 - 97.8 FL    MCH 32.9 26.1 - 32.9 PG    MCHC 31.6 31.4 - 35.0 g/dL    RDW 14.7 (H) 11.9 - 14.6 %    PLATELET 999 795 - 779 K/uL    MPV 10.2 9.4 - 12.3 FL    ABSOLUTE NRBC 0.00 0.0 - 0.2 K/uL    DF AUTOMATED      NEUTROPHILS 64 43 - 78 %    LYMPHOCYTES 18 13 - 44 %    MONOCYTES 14 (H) 4.0 - 12.0 %    EOSINOPHILS 3 0.5 - 7.8 %    BASOPHILS 1 0.0 - 2.0 %    IMMATURE GRANULOCYTES 1 0.0 - 5.0 %    ABS. NEUTROPHILS 4.4 1.7 - 8.2 K/UL    ABS. LYMPHOCYTES 1.3 0.5 - 4.6 K/UL    ABS. MONOCYTES 1.0 0.1 - 1.3 K/UL    ABS. EOSINOPHILS 0.2 0.0 - 0.8 K/UL    ABS. BASOPHILS 0.0 0.0 - 0.2 K/UL    ABS. IMM.  GRANS. 0.1 0.0 - 0.5 K/UL METABOLIC PANEL, COMPREHENSIVE    Collection Time: 10/31/21  9:58 AM   Result Value Ref Range    Sodium 139 136 - 145 mmol/L    Potassium 4.2 3.5 - 5.1 mmol/L    Chloride 109 (H) 98 - 107 mmol/L    CO2 27 21 - 32 mmol/L    Anion gap 3 (L) 7 - 16 mmol/L    Glucose 163 (H) 65 - 100 mg/dL    BUN 28 (H) 8 - 23 MG/DL    Creatinine 1.16 (H) 0.6 - 1.0 MG/DL    GFR est AA 56 (L) >60 ml/min/1.73m2    GFR est non-AA 47 (L) >60 ml/min/1.73m2    Calcium 10.0 8.3 - 10.4 MG/DL    Bilirubin, total 0.4 0.2 - 1.1 MG/DL    ALT (SGPT) 14 12 - 65 U/L    AST (SGOT) 17 15 - 37 U/L    Alk.  phosphatase 150 (H) 50 - 136 U/L    Protein, total 7.2 6.3 - 8.2 g/dL    Albumin 3.1 (L) 3.2 - 4.6 g/dL    Globulin 4.1 (H) 2.3 - 3.5 g/dL    A-G Ratio 0.8 (L) 1.2 - 3.5     LIPASE    Collection Time: 10/31/21  9:58 AM   Result Value Ref Range    Lipase 109 73 - 393 U/L   TROPONIN-HIGH SENSITIVITY    Collection Time: 10/31/21  9:58 AM   Result Value Ref Range    Troponin-High Sensitivity 12.1 0 - 14 pg/mL   NT-PRO BNP    Collection Time: 10/31/21  9:58 AM   Result Value Ref Range    NT pro-BNP 2,615 (H) <450 PG/ML   COVID-19 RAPID TEST    Collection Time: 10/31/21 11:03 AM   Result Value Ref Range    Specimen source NASAL      COVID-19 rapid test Not detected NOTD     TROPONIN-HIGH SENSITIVITY    Collection Time: 10/31/21 11:42 AM   Result Value Ref Range    Troponin-High Sensitivity 12.4 0 - 14 pg/mL   BLOOD GAS, ARTERIAL POC    Collection Time: 10/31/21  1:53 PM   Result Value Ref Range    Device: ROOM AIR      pH (POC) 7.41 7.35 - 7.45      pCO2 (POC) 39.0 35 - 45 MMHG    pO2 (POC) 52 (L) 75 - 100 MMHG    HCO3 (POC) 24.7 22 - 26 MMOL/L    sO2 (POC) 87.0 (L) 95 - 98 %    Base excess (POC) 0.1 mmol/L    Allens test (POC) Positive      Site RIGHT RADIAL      Specimen type (POC) ARTERIAL      Performed by Abdi     CO2, POC 25 (H) 13 - 23 MMOL/L       All Micro Results     Procedure Component Value Units Date/Time    COVID-19 RAPID TEST [[id number]] Collected: 10/31/21 1103    Order Status: Completed Specimen: Nasopharyngeal Updated: 10/31/21 1126     Specimen source NASAL        Comment: RAPID ONLY        COVID-19 rapid test Not detected        Comment:      The specimen is NEGATIVE for SARS-CoV-2, the novel coronavirus associated with COVID-19. A negative result does not rule out COVID-19. This test has been authorized by the FDA under an Emergency Use Authorization (EUA) for use by authorized laboratories. Fact sheet for Healthcare Providers: [employer]  Fact sheet for Patients: Yo que Vos.nz       Methodology: Isothermal Nucleic Acid Amplification               Other Studies:  XR CHEST PORT    Result Date: 10/31/2021  EXAM: XR CHEST PORT INDICATION: CP COMPARISON: None. FINDINGS: A portable AP radiograph of the chest was obtained at 0945 hours. The patient is on a cardiac monitor. Diffuse increased interstitial markings. Right pleural effusion. The cardiac and mediastinal contours and pulmonary vascularity are normal.  Median sternotomy. Pulmonary edema with a right pleural effusion. Medications Administered     furosemide (LASIX) injection 40 mg     Admin Date  10/31/2021 Action  Given Dose  40 mg Route  IntraVENous Administered By  Vicente Cortez RN                Signed:  Denise Correia MD    Part of this note may have been written by using a voice dictation software. The note has been proof read but may still contain some grammatical/other typographical errors.

## 2021-11-10 ENCOUNTER — OFFICE VISIT (OUTPATIENT)
Dept: OBSTETRICS AND GYNECOLOGY | Facility: CLINIC | Age: 31
End: 2021-11-10
Payer: COMMERCIAL

## 2021-11-10 VITALS
DIASTOLIC BLOOD PRESSURE: 78 MMHG | SYSTOLIC BLOOD PRESSURE: 126 MMHG | HEIGHT: 65 IN | WEIGHT: 262.69 LBS | BODY MASS INDEX: 43.76 KG/M2

## 2021-11-10 DIAGNOSIS — F41.9 ANXIETY: ICD-10-CM

## 2021-11-10 DIAGNOSIS — Z30.41 ENCOUNTER FOR SURVEILLANCE OF CONTRACEPTIVE PILLS: ICD-10-CM

## 2021-11-10 DIAGNOSIS — Z01.419 ENCOUNTER FOR ANNUAL ROUTINE GYNECOLOGICAL EXAMINATION: Primary | ICD-10-CM

## 2021-11-10 PROCEDURE — 99395 PR PREVENTIVE VISIT,EST,18-39: ICD-10-PCS | Mod: S$GLB,,, | Performed by: OBSTETRICS & GYNECOLOGY

## 2021-11-10 PROCEDURE — 3074F SYST BP LT 130 MM HG: CPT | Mod: CPTII,S$GLB,, | Performed by: OBSTETRICS & GYNECOLOGY

## 2021-11-10 PROCEDURE — 99999 PR PBB SHADOW E&M-EST. PATIENT-LVL III: CPT | Mod: PBBFAC,,, | Performed by: OBSTETRICS & GYNECOLOGY

## 2021-11-10 PROCEDURE — 1159F PR MEDICATION LIST DOCUMENTED IN MEDICAL RECORD: ICD-10-PCS | Mod: CPTII,S$GLB,, | Performed by: OBSTETRICS & GYNECOLOGY

## 2021-11-10 PROCEDURE — 3008F BODY MASS INDEX DOCD: CPT | Mod: CPTII,S$GLB,, | Performed by: OBSTETRICS & GYNECOLOGY

## 2021-11-10 PROCEDURE — 3078F PR MOST RECENT DIASTOLIC BLOOD PRESSURE < 80 MM HG: ICD-10-PCS | Mod: CPTII,S$GLB,, | Performed by: OBSTETRICS & GYNECOLOGY

## 2021-11-10 PROCEDURE — 3078F DIAST BP <80 MM HG: CPT | Mod: CPTII,S$GLB,, | Performed by: OBSTETRICS & GYNECOLOGY

## 2021-11-10 PROCEDURE — 3008F PR BODY MASS INDEX (BMI) DOCUMENTED: ICD-10-PCS | Mod: CPTII,S$GLB,, | Performed by: OBSTETRICS & GYNECOLOGY

## 2021-11-10 PROCEDURE — 3074F PR MOST RECENT SYSTOLIC BLOOD PRESSURE < 130 MM HG: ICD-10-PCS | Mod: CPTII,S$GLB,, | Performed by: OBSTETRICS & GYNECOLOGY

## 2021-11-10 PROCEDURE — 1159F MED LIST DOCD IN RCRD: CPT | Mod: CPTII,S$GLB,, | Performed by: OBSTETRICS & GYNECOLOGY

## 2021-11-10 PROCEDURE — 99395 PREV VISIT EST AGE 18-39: CPT | Mod: S$GLB,,, | Performed by: OBSTETRICS & GYNECOLOGY

## 2021-11-10 PROCEDURE — 99999 PR PBB SHADOW E&M-EST. PATIENT-LVL III: ICD-10-PCS | Mod: PBBFAC,,, | Performed by: OBSTETRICS & GYNECOLOGY

## 2021-11-10 RX ORDER — LEVONORGESTREL / ETHINYL ESTRADIOL AND ETHINYL ESTRADIOL 150-30(84)
1 KIT ORAL DAILY
Qty: 91 EACH | Refills: 3 | Status: SHIPPED | OUTPATIENT
Start: 2021-11-10 | End: 2022-09-13

## 2021-11-10 RX ORDER — CITALOPRAM 10 MG/1
10 TABLET ORAL DAILY
Qty: 90 TABLET | Refills: 3 | Status: SHIPPED | OUTPATIENT
Start: 2021-11-10 | End: 2022-09-13

## 2022-05-11 ENCOUNTER — TELEPHONE (OUTPATIENT)
Dept: OBSTETRICS AND GYNECOLOGY | Facility: CLINIC | Age: 32
End: 2022-05-11
Payer: COMMERCIAL

## 2022-05-11 NOTE — TELEPHONE ENCOUNTER
Called pt     Pt answered. Informed pt that hypertension without pregnancy or newly PP will have to be treated by PCP. Pt verbalized understanding

## 2022-08-31 DIAGNOSIS — Z34.90 PREGNANCY, UNSPECIFIED GESTATIONAL AGE: Primary | ICD-10-CM

## 2022-09-13 ENCOUNTER — OFFICE VISIT (OUTPATIENT)
Dept: OBSTETRICS AND GYNECOLOGY | Facility: CLINIC | Age: 32
End: 2022-09-13
Payer: COMMERCIAL

## 2022-09-13 ENCOUNTER — PROCEDURE VISIT (OUTPATIENT)
Dept: OBSTETRICS AND GYNECOLOGY | Facility: CLINIC | Age: 32
End: 2022-09-13
Payer: COMMERCIAL

## 2022-09-13 VITALS
BODY MASS INDEX: 44.88 KG/M2 | DIASTOLIC BLOOD PRESSURE: 60 MMHG | HEIGHT: 65 IN | WEIGHT: 269.38 LBS | SYSTOLIC BLOOD PRESSURE: 124 MMHG

## 2022-09-13 DIAGNOSIS — N92.6 MISSED MENSES: Primary | ICD-10-CM

## 2022-09-13 DIAGNOSIS — N92.6 MISSED MENSES: ICD-10-CM

## 2022-09-13 DIAGNOSIS — O34.10 UTERINE FIBROIDS AFFECTING PREGNANCY, UNSPECIFIED TRIMESTER: ICD-10-CM

## 2022-09-13 DIAGNOSIS — D25.9 UTERINE FIBROIDS AFFECTING PREGNANCY, UNSPECIFIED TRIMESTER: ICD-10-CM

## 2022-09-13 PROCEDURE — 87591 N.GONORRHOEAE DNA AMP PROB: CPT | Performed by: NURSE PRACTITIONER

## 2022-09-13 PROCEDURE — 3074F SYST BP LT 130 MM HG: CPT | Mod: CPTII,S$GLB,, | Performed by: NURSE PRACTITIONER

## 2022-09-13 PROCEDURE — 87086 URINE CULTURE/COLONY COUNT: CPT | Performed by: NURSE PRACTITIONER

## 2022-09-13 PROCEDURE — 1159F PR MEDICATION LIST DOCUMENTED IN MEDICAL RECORD: ICD-10-PCS | Mod: CPTII,S$GLB,, | Performed by: NURSE PRACTITIONER

## 2022-09-13 PROCEDURE — 87088 URINE BACTERIA CULTURE: CPT | Performed by: NURSE PRACTITIONER

## 2022-09-13 PROCEDURE — 3078F DIAST BP <80 MM HG: CPT | Mod: CPTII,S$GLB,, | Performed by: NURSE PRACTITIONER

## 2022-09-13 PROCEDURE — 3008F BODY MASS INDEX DOCD: CPT | Mod: CPTII,S$GLB,, | Performed by: NURSE PRACTITIONER

## 2022-09-13 PROCEDURE — 87491 CHLMYD TRACH DNA AMP PROBE: CPT | Performed by: NURSE PRACTITIONER

## 2022-09-13 PROCEDURE — 99214 PR OFFICE/OUTPT VISIT, EST, LEVL IV, 30-39 MIN: ICD-10-PCS | Mod: S$GLB,,, | Performed by: NURSE PRACTITIONER

## 2022-09-13 PROCEDURE — 76801 US OB/GYN EXTENDED PROCEDURE (VIEWPOINT): ICD-10-PCS | Mod: S$GLB,,, | Performed by: OBSTETRICS & GYNECOLOGY

## 2022-09-13 PROCEDURE — 3008F PR BODY MASS INDEX (BMI) DOCUMENTED: ICD-10-PCS | Mod: CPTII,S$GLB,, | Performed by: NURSE PRACTITIONER

## 2022-09-13 PROCEDURE — 99214 OFFICE O/P EST MOD 30 MIN: CPT | Mod: S$GLB,,, | Performed by: NURSE PRACTITIONER

## 2022-09-13 PROCEDURE — 76801 OB US < 14 WKS SINGLE FETUS: CPT | Mod: S$GLB,,, | Performed by: OBSTETRICS & GYNECOLOGY

## 2022-09-13 PROCEDURE — 1159F MED LIST DOCD IN RCRD: CPT | Mod: CPTII,S$GLB,, | Performed by: NURSE PRACTITIONER

## 2022-09-13 PROCEDURE — 99999 PR PBB SHADOW E&M-EST. PATIENT-LVL III: CPT | Mod: PBBFAC,,, | Performed by: NURSE PRACTITIONER

## 2022-09-13 PROCEDURE — 99999 PR PBB SHADOW E&M-EST. PATIENT-LVL III: ICD-10-PCS | Mod: PBBFAC,,, | Performed by: NURSE PRACTITIONER

## 2022-09-13 PROCEDURE — 87186 SC STD MICRODIL/AGAR DIL: CPT | Performed by: NURSE PRACTITIONER

## 2022-09-13 PROCEDURE — 3078F PR MOST RECENT DIASTOLIC BLOOD PRESSURE < 80 MM HG: ICD-10-PCS | Mod: CPTII,S$GLB,, | Performed by: NURSE PRACTITIONER

## 2022-09-13 PROCEDURE — 3074F PR MOST RECENT SYSTOLIC BLOOD PRESSURE < 130 MM HG: ICD-10-PCS | Mod: CPTII,S$GLB,, | Performed by: NURSE PRACTITIONER

## 2022-09-13 PROCEDURE — 87077 CULTURE AEROBIC IDENTIFY: CPT | Performed by: NURSE PRACTITIONER

## 2022-09-13 NOTE — PROGRESS NOTES
"    CC: Positive Pregnancy Test    HISTORY OF PRESENT ILLNESS:    Carie Sun is a 32 y.o. female, ,  Presents today for a routine exam complaining of amenorrhea and positive home urine pregnancy test.  No LMP recorded (lmp unknown).   She is not currently on any contraception. Taking PNV. Reports breast tenderness. Denies vaginal bleeding and pelvic pain.    . No reported medical history for patient or FOB. No reported personal/familial history of genetic or chromosomal issues for patient or FOB. No reported abdominal surgeries.      ROS:  GENERAL: No weight changes. No swelling. No fatigue. No fever.  CARDIOVASCULAR: No chest pain. No shortness of breath. No leg cramps.   NEUROLOGICAL: No headaches. No vision changes.  BREASTS: No pain. No lumps. No discharge.  ABDOMEN: No pain. No diarrhea. No constipation.  REPRODUCTIVE: No abnormal bleeding.   VULVA: No pain. No lesions. No itching.  VAGINA: No relaxation. No itching. No odor. No discharge. No lesions.  URINARY: No incontinence. No nocturia. No frequency. No dysuria.    MEDICATIONS AND ALLERGIES:  Reviewed        COMPREHENSIVE GYN HISTORY:  PAP History: Denies abnormal Paps.  Infection History: Denies STDs. Denies PID.  Benign History: Denies uterine fibroids. Denies ovarian cysts. Denies endometriosis. Denies other conditions.  Cancer History: Denies cervical cancer. Denies uterine cancer or hyperplasia. Denies ovarian cancer. Denies vulvar cancer or pre-cancer. Denies vaginal cancer or pre-cancer. Denies breast cancer. Denies colon cancer.  Sexual Activity History: Reports currently being sexually active  Menstrual History: None.  Contraception: None    /60   Ht 5' 5" (1.651 m)   Wt 122.2 kg (269 lb 6.4 oz)   LMP  (LMP Unknown)   BMI 44.83 kg/m²     PE:  AFFECT: Calm, alert and oriented X 3. Interactive during exam  GENERAL: Appears well-nourished, well-developed, in no acute distress.  HEAD: Normocephalic, atruamatic  SKIN: Normal " for race, warm, & dry. No lesions or rashes.  ABDOMEN: Soft and nontender without masses or organomegally.  EXTREMITIES: No cyanosis, clubbing or edema. No calf tenderness.  LYMPH NODES: No axillary or inguinal adenopathy.    PROCEDURES:  UPT Positive  Genprobe Urine        ASSESSMENT/PLAN:  Amenorrhea  Positive urine pregnancy test (PETE: , EGA: 7w3d based on LMP)    -  Routine prenatal care    Nausea and vomiting in pregnancy    -  Education regarding lifestyle and dietary modifications    -  Advised use of B6/Unisom. Pt will notify us if no relief/worsening symptoms, will consider Zofran if needed.    Uterine Fibroid, current pregnancy   - 2 uterine fibroids noted on OBUS, largest is 6 cm, posterior pedulous   - Discussed may have occasional discomfort    1st TRIMESTER COUNSELING:   Common complaints of pregnancy  HIV and other routine prenatal tests including  genetic screening  Risk factors identified by prenatal history  Oriented to practice - discussed anticipated course of prenatal care & indications for Ultrasound  Childbirth classes/Hospital facilities   Nutrition and weight gain counseling  Toxoplasmosis precautions (Cats/Raw Meat)  Sexual activity and exercise  Environmental/Work hazards  Travel  Tobacco (Ask, Advise, Assess, Assist, and Arrange), as well as alcohol and drug use  Use of any medications (Including supplements, Vitamins, Herbs, or OTC Drugs)  Domestic violence  Seat belt use      TERATOLOGY COUNSELING:   Discussed indications and options for aneuploidy screening - pamphlets given    -  Pt desires MT21, brochure given, she will contact to discuss out of pocket cost     Dating US today, plan for initial labs and MT21 following next visit. Anatomy scan ordered.  FOLLOW-UP in 4 weeks with Dr. Art Myers NP    OB/GYN

## 2022-09-14 LAB
C TRACH DNA SPEC QL NAA+PROBE: NOT DETECTED
N GONORRHOEA DNA SPEC QL NAA+PROBE: NOT DETECTED

## 2022-09-15 ENCOUNTER — TELEPHONE (OUTPATIENT)
Dept: OBSTETRICS AND GYNECOLOGY | Facility: CLINIC | Age: 32
End: 2022-09-15
Payer: COMMERCIAL

## 2022-09-15 DIAGNOSIS — Z34.90 PREGNANCY, UNSPECIFIED GESTATIONAL AGE: Primary | ICD-10-CM

## 2022-09-15 DIAGNOSIS — O99.891 ASYMPTOMATIC BACTERIURIA DURING PREGNANCY IN FIRST TRIMESTER: Primary | ICD-10-CM

## 2022-09-15 DIAGNOSIS — R82.71 ASYMPTOMATIC BACTERIURIA DURING PREGNANCY IN FIRST TRIMESTER: Primary | ICD-10-CM

## 2022-09-15 LAB — BACTERIA UR CULT: ABNORMAL

## 2022-09-15 RX ORDER — CEPHALEXIN 500 MG/1
500 CAPSULE ORAL EVERY 8 HOURS
Qty: 21 CAPSULE | Refills: 0 | Status: SHIPPED | OUTPATIENT
Start: 2022-09-15 | End: 2022-09-22

## 2022-09-15 NOTE — TELEPHONE ENCOUNTER
----- Message from Flori Myers NP sent at 9/15/2022  9:24 AM CDT -----  Sania,    Please call and notify that her urine culture showed a UTI- this is important to treat in early pregnancy. I sent Keflex three times daily for the next week for treatment, it is safe to take this medication in pregnancy.     Flori  ----- Message -----  From: Sven, [facility] Interface  Sent: 9/14/2022  12:58 PM CDT  To: Flori Myers NP

## 2022-09-27 ENCOUNTER — PROCEDURE VISIT (OUTPATIENT)
Dept: OBSTETRICS AND GYNECOLOGY | Facility: CLINIC | Age: 32
End: 2022-09-27
Payer: COMMERCIAL

## 2022-09-27 ENCOUNTER — INITIAL PRENATAL (OUTPATIENT)
Dept: OBSTETRICS AND GYNECOLOGY | Facility: CLINIC | Age: 32
End: 2022-09-27
Payer: COMMERCIAL

## 2022-09-27 VITALS
BODY MASS INDEX: 45.14 KG/M2 | DIASTOLIC BLOOD PRESSURE: 70 MMHG | SYSTOLIC BLOOD PRESSURE: 124 MMHG | WEIGHT: 271.25 LBS

## 2022-09-27 DIAGNOSIS — O34.10 UTERINE FIBROIDS AFFECTING PREGNANCY, UNSPECIFIED TRIMESTER: ICD-10-CM

## 2022-09-27 DIAGNOSIS — Z34.90 PREGNANCY, UNSPECIFIED GESTATIONAL AGE: ICD-10-CM

## 2022-09-27 DIAGNOSIS — D25.9 UTERINE FIBROIDS AFFECTING PREGNANCY, UNSPECIFIED TRIMESTER: ICD-10-CM

## 2022-09-27 DIAGNOSIS — O99.210 MATERNAL OBESITY AFFECTING PREGNANCY, ANTEPARTUM: ICD-10-CM

## 2022-09-27 DIAGNOSIS — Z3A.09 9 WEEKS GESTATION OF PREGNANCY: Primary | ICD-10-CM

## 2022-09-27 PROCEDURE — 76801 OB US < 14 WKS SINGLE FETUS: CPT | Mod: S$GLB,,, | Performed by: OBSTETRICS & GYNECOLOGY

## 2022-09-27 PROCEDURE — 99999 PR PBB SHADOW E&M-EST. PATIENT-LVL II: ICD-10-PCS | Mod: PBBFAC,,, | Performed by: OBSTETRICS & GYNECOLOGY

## 2022-09-27 PROCEDURE — 99999 PR PBB SHADOW E&M-EST. PATIENT-LVL II: CPT | Mod: PBBFAC,,, | Performed by: OBSTETRICS & GYNECOLOGY

## 2022-09-27 PROCEDURE — 76801 US OB/GYN EXTENDED PROCEDURE (VIEWPOINT): ICD-10-PCS | Mod: S$GLB,,, | Performed by: OBSTETRICS & GYNECOLOGY

## 2022-09-27 PROCEDURE — 0500F INITIAL PRENATAL CARE VISIT: CPT | Mod: CPTII,S$GLB,, | Performed by: OBSTETRICS & GYNECOLOGY

## 2022-09-27 PROCEDURE — 0500F PR INITIAL PRENATAL CARE VISIT: ICD-10-PCS | Mod: CPTII,S$GLB,, | Performed by: OBSTETRICS & GYNECOLOGY

## 2022-09-27 NOTE — PROGRESS NOTES
9w4d without major complaints.   Plans to do Mat21 and Initial PNL on 10/4.   Flu shot recommended to patient and partner.   Enrolled in [employer] today.   RTC in 4 weeks for routine PNC.

## 2022-09-28 ENCOUNTER — PATIENT MESSAGE (OUTPATIENT)
Dept: ADMINISTRATIVE | Facility: OTHER | Age: 32
End: 2022-09-28
Payer: COMMERCIAL

## 2022-10-03 ENCOUNTER — PATIENT MESSAGE (OUTPATIENT)
Dept: OBSTETRICS AND GYNECOLOGY | Facility: CLINIC | Age: 32
End: 2022-10-03
Payer: COMMERCIAL

## 2022-10-06 ENCOUNTER — LAB VISIT (OUTPATIENT)
Dept: LAB | Facility: OTHER | Age: 32
End: 2022-10-06
Attending: NURSE PRACTITIONER
Payer: COMMERCIAL

## 2022-10-06 DIAGNOSIS — Z34.90 PREGNANCY, UNSPECIFIED GESTATIONAL AGE: ICD-10-CM

## 2022-10-06 LAB
ABO + RH BLD: NORMAL
BASOPHILS # BLD AUTO: 0.02 K/UL (ref 0–0.2)
BASOPHILS NFR BLD: 0.3 % (ref 0–1.9)
BLD GP AB SCN CELLS X3 SERPL QL: NORMAL
DIFFERENTIAL METHOD: ABNORMAL
EOSINOPHIL # BLD AUTO: 0.1 K/UL (ref 0–0.5)
EOSINOPHIL NFR BLD: 2.3 % (ref 0–8)
ERYTHROCYTE [DISTWIDTH] IN BLOOD BY AUTOMATED COUNT: 13.3 % (ref 11.5–14.5)
HBV SURFACE AG SERPL QL IA: NORMAL
HCT VFR BLD AUTO: 32.6 % (ref 37–48.5)
HCV AB SERPL QL IA: NORMAL
HGB BLD-MCNC: 11.2 G/DL (ref 12–16)
HIV 1+2 AB+HIV1 P24 AG SERPL QL IA: NORMAL
IMM GRANULOCYTES # BLD AUTO: 0.04 K/UL (ref 0–0.04)
IMM GRANULOCYTES NFR BLD AUTO: 0.7 % (ref 0–0.5)
LYMPHOCYTES # BLD AUTO: 1.3 K/UL (ref 1–4.8)
LYMPHOCYTES NFR BLD: 21.6 % (ref 18–48)
MCH RBC QN AUTO: 30.7 PG (ref 27–31)
MCHC RBC AUTO-ENTMCNC: 34.4 G/DL (ref 32–36)
MCV RBC AUTO: 89 FL (ref 82–98)
MONOCYTES # BLD AUTO: 0.6 K/UL (ref 0.3–1)
MONOCYTES NFR BLD: 9.5 % (ref 4–15)
NEUTROPHILS # BLD AUTO: 4 K/UL (ref 1.8–7.7)
NEUTROPHILS NFR BLD: 65.6 % (ref 38–73)
NRBC BLD-RTO: 0 /100 WBC
PLATELET # BLD AUTO: 279 K/UL (ref 150–450)
PMV BLD AUTO: 9.4 FL (ref 9.2–12.9)
RBC # BLD AUTO: 3.65 M/UL (ref 4–5.4)
TSH SERPL DL<=0.005 MIU/L-ACNC: 1.05 UIU/ML (ref 0.4–4)
WBC # BLD AUTO: 6.03 K/UL (ref 3.9–12.7)

## 2022-10-06 PROCEDURE — 84443 ASSAY THYROID STIM HORMONE: CPT | Performed by: NURSE PRACTITIONER

## 2022-10-06 PROCEDURE — 86803 HEPATITIS C AB TEST: CPT | Performed by: NURSE PRACTITIONER

## 2022-10-06 PROCEDURE — 86901 BLOOD TYPING SEROLOGIC RH(D): CPT | Performed by: NURSE PRACTITIONER

## 2022-10-06 PROCEDURE — 86762 RUBELLA ANTIBODY: CPT | Performed by: NURSE PRACTITIONER

## 2022-10-06 PROCEDURE — 86592 SYPHILIS TEST NON-TREP QUAL: CPT | Performed by: NURSE PRACTITIONER

## 2022-10-06 PROCEDURE — 87340 HEPATITIS B SURFACE AG IA: CPT | Performed by: NURSE PRACTITIONER

## 2022-10-06 PROCEDURE — 87389 HIV-1 AG W/HIV-1&-2 AB AG IA: CPT | Performed by: NURSE PRACTITIONER

## 2022-10-06 PROCEDURE — 85025 COMPLETE CBC W/AUTO DIFF WBC: CPT | Performed by: NURSE PRACTITIONER

## 2022-10-06 PROCEDURE — 36415 COLL VENOUS BLD VENIPUNCTURE: CPT | Performed by: NURSE PRACTITIONER

## 2022-10-07 LAB
RPR SER QL: NORMAL
RUBV IGG SER-ACNC: 42.5 IU/ML
RUBV IGG SER-IMP: REACTIVE

## 2022-10-17 ENCOUNTER — PATIENT MESSAGE (OUTPATIENT)
Dept: OBSTETRICS AND GYNECOLOGY | Facility: CLINIC | Age: 32
End: 2022-10-17
Payer: COMMERCIAL

## 2022-10-17 DIAGNOSIS — O99.891 SHORTNESS OF BREATH IN ANTEPARTUM PERIOD: Primary | ICD-10-CM

## 2022-10-17 DIAGNOSIS — R06.02 SHORTNESS OF BREATH IN ANTEPARTUM PERIOD: Primary | ICD-10-CM

## 2022-10-17 DIAGNOSIS — R07.1 PAIN OF ANTERIOR CHEST WALL WITH RESPIRATION: ICD-10-CM

## 2022-10-24 ENCOUNTER — TELEPHONE (OUTPATIENT)
Dept: OBSTETRICS AND GYNECOLOGY | Facility: CLINIC | Age: 32
End: 2022-10-24
Payer: COMMERCIAL

## 2022-10-26 ENCOUNTER — ROUTINE PRENATAL (OUTPATIENT)
Dept: OBSTETRICS AND GYNECOLOGY | Facility: CLINIC | Age: 32
End: 2022-10-26
Payer: COMMERCIAL

## 2022-10-26 VITALS
SYSTOLIC BLOOD PRESSURE: 110 MMHG | WEIGHT: 268.75 LBS | DIASTOLIC BLOOD PRESSURE: 84 MMHG | BODY MASS INDEX: 44.72 KG/M2

## 2022-10-26 DIAGNOSIS — Z3A.13 13 WEEKS GESTATION OF PREGNANCY: Primary | ICD-10-CM

## 2022-10-26 DIAGNOSIS — Z36.89 ENCOUNTER FOR FETAL ANATOMIC SURVEY: ICD-10-CM

## 2022-10-26 PROCEDURE — 99999 PR PBB SHADOW E&M-EST. PATIENT-LVL II: ICD-10-PCS | Mod: PBBFAC,,, | Performed by: NURSE PRACTITIONER

## 2022-10-26 PROCEDURE — 99999 PR PBB SHADOW E&M-EST. PATIENT-LVL II: CPT | Mod: PBBFAC,,, | Performed by: NURSE PRACTITIONER

## 2022-10-26 PROCEDURE — 0502F PR SUBSEQUENT PRENATAL CARE: ICD-10-PCS | Mod: CPTII,S$GLB,, | Performed by: NURSE PRACTITIONER

## 2022-10-26 PROCEDURE — 0502F SUBSEQUENT PRENATAL CARE: CPT | Mod: CPTII,S$GLB,, | Performed by: NURSE PRACTITIONER

## 2022-10-26 NOTE — PROGRESS NOTES
Presents at 13w4d for MARIO ALBERTO. Doing well. Anatomy scan ordered. Received flu vaccine at [facility] 2 weeks ago. FU in 4 weeks for MARIO ALBERTO.

## 2022-10-27 ENCOUNTER — HOSPITAL ENCOUNTER (OUTPATIENT)
Dept: CARDIOLOGY | Facility: HOSPITAL | Age: 32
Discharge: HOME | End: 2022-10-27
Attending: OBSTETRICS & GYNECOLOGY
Payer: COMMERCIAL

## 2022-10-27 DIAGNOSIS — O99.891 SHORTNESS OF BREATH IN ANTEPARTUM PERIOD: ICD-10-CM

## 2022-10-27 DIAGNOSIS — R07.1 PAIN OF ANTERIOR CHEST WALL WITH RESPIRATION: ICD-10-CM

## 2022-10-27 DIAGNOSIS — R06.02 SHORTNESS OF BREATH IN ANTEPARTUM PERIOD: ICD-10-CM

## 2022-10-27 PROCEDURE — 93227 XTRNL ECG REC<48 HR R&I: CPT | Mod: ,,, | Performed by: INTERNAL MEDICINE

## 2022-10-27 PROCEDURE — 93225 XTRNL ECG REC<48 HRS REC: CPT

## 2022-10-27 PROCEDURE — 93227 HOLTER MONITOR - 48 HOUR (CUPID ONLY): ICD-10-PCS | Mod: ,,, | Performed by: INTERNAL MEDICINE

## 2022-11-08 LAB
OHS CV EVENT MONITOR DAY: 0
OHS CV HOLTER LENGTH DECIMAL HOURS: 48
OHS CV HOLTER LENGTH HOURS: 48
OHS CV HOLTER LENGTH MINUTES: 0
OHS CV HOLTER SINUS AVERAGE HR: 96
OHS CV HOLTER SINUS MAX HR: 169
OHS CV HOLTER SINUS MIN HR: 66

## 2022-11-10 ENCOUNTER — PATIENT MESSAGE (OUTPATIENT)
Dept: OBSTETRICS AND GYNECOLOGY | Facility: CLINIC | Age: 32
End: 2022-11-10
Payer: COMMERCIAL

## 2022-11-14 ENCOUNTER — TELEPHONE (OUTPATIENT)
Dept: OBSTETRICS AND GYNECOLOGY | Facility: CLINIC | Age: 32
End: 2022-11-14
Payer: COMMERCIAL

## 2022-11-14 NOTE — TELEPHONE ENCOUNTER
----- Message from Melissa Campos sent at 11/14/2022  4:04 PM CST -----  Type: Patient Call Back    Who called: Self     What is the request in detail: PT wants to reschedule Ultrasound appt on 12/07 for 12/09 or the 12th or 13th she stated it have to be after 3pm     Can the clinic reply by [facility]? No     Would the patient rather a call back or a response via [facility]? Call     Best call back number: [phone]

## 2022-11-15 ENCOUNTER — PATIENT MESSAGE (OUTPATIENT)
Dept: MATERNAL FETAL MEDICINE | Facility: CLINIC | Age: 32
End: 2022-11-15
Payer: COMMERCIAL

## 2022-11-22 ENCOUNTER — ROUTINE PRENATAL (OUTPATIENT)
Dept: OBSTETRICS AND GYNECOLOGY | Facility: CLINIC | Age: 32
End: 2022-11-22
Payer: COMMERCIAL

## 2022-11-22 VITALS — SYSTOLIC BLOOD PRESSURE: 122 MMHG | DIASTOLIC BLOOD PRESSURE: 70 MMHG

## 2022-11-22 DIAGNOSIS — R07.9 CHEST PAIN, UNSPECIFIED TYPE: Primary | ICD-10-CM

## 2022-11-22 PROCEDURE — 0502F SUBSEQUENT PRENATAL CARE: CPT | Mod: S$GLB,,, | Performed by: STUDENT IN AN ORGANIZED HEALTH CARE EDUCATION/TRAINING PROGRAM

## 2022-11-22 PROCEDURE — 0502F PR SUBSEQUENT PRENATAL CARE: ICD-10-PCS | Mod: S$GLB,,, | Performed by: STUDENT IN AN ORGANIZED HEALTH CARE EDUCATION/TRAINING PROGRAM

## 2022-11-22 PROCEDURE — 99999 PR PBB SHADOW E&M-EST. PATIENT-LVL III: ICD-10-PCS | Mod: PBBFAC,,, | Performed by: STUDENT IN AN ORGANIZED HEALTH CARE EDUCATION/TRAINING PROGRAM

## 2022-11-22 PROCEDURE — 99999 PR PBB SHADOW E&M-EST. PATIENT-LVL III: CPT | Mod: PBBFAC,,, | Performed by: STUDENT IN AN ORGANIZED HEALTH CARE EDUCATION/TRAINING PROGRAM

## 2022-11-22 NOTE — PROGRESS NOTES
-FM/CTX/LOF/VB. Denies dysuria, hematuria, fevers, or n/v.  Upcoming anatomy US 12/14/22, patient aware.   Holter showed sinus rhythm/rare PACs/PVCs. Chest pain has not yet resolved. Not currently experiencing. No identifiable trigger, can occur when laying down. Localized to middle chest. Worse with deep breathe, but comes and goes, does endorse stress in life, no meal association, and no temporal association with the pain. On physical exam, patient in NAD, in no respiratory distress, no use of accessory muscles of respiration, no cyanosis, she is speaking comfortably in full sentences, and well perfused throughout. EKG ordered for baseline. Continue adequate PO hydration. Consider CXR. ER precautions if returning/worsening. Vscan revealed +FM, +CM. FHR 150s by doppler. Presents with SO. Patient of Dr. Cordova. RTC 3-4 weeks or sooner PRN.

## 2022-11-23 ENCOUNTER — HOSPITAL ENCOUNTER (OUTPATIENT)
Dept: CARDIOLOGY | Facility: CLINIC | Age: 32
Discharge: HOME | End: 2022-11-23
Payer: COMMERCIAL

## 2022-11-23 DIAGNOSIS — R07.9 CHEST PAIN, UNSPECIFIED TYPE: ICD-10-CM

## 2022-11-23 PROCEDURE — 93005 ELECTROCARDIOGRAM TRACING: CPT | Mod: S$GLB,,, | Performed by: STUDENT IN AN ORGANIZED HEALTH CARE EDUCATION/TRAINING PROGRAM

## 2022-11-23 PROCEDURE — 93010 EKG 12-LEAD: ICD-10-PCS | Mod: S$GLB,,, | Performed by: INTERNAL MEDICINE

## 2022-11-23 PROCEDURE — 93010 ELECTROCARDIOGRAM REPORT: CPT | Mod: S$GLB,,, | Performed by: INTERNAL MEDICINE

## 2022-11-23 PROCEDURE — 93005 EKG 12-LEAD: ICD-10-PCS | Mod: S$GLB,,, | Performed by: STUDENT IN AN ORGANIZED HEALTH CARE EDUCATION/TRAINING PROGRAM

## 2022-11-27 ENCOUNTER — PATIENT MESSAGE (OUTPATIENT)
Dept: OBSTETRICS AND GYNECOLOGY | Facility: CLINIC | Age: 32
End: 2022-11-27
Payer: COMMERCIAL

## 2022-12-10 ENCOUNTER — PATIENT MESSAGE (OUTPATIENT)
Dept: OBSTETRICS AND GYNECOLOGY | Facility: CLINIC | Age: 32
End: 2022-12-10
Payer: COMMERCIAL

## 2022-12-13 ENCOUNTER — PATIENT MESSAGE (OUTPATIENT)
Dept: MATERNAL FETAL MEDICINE | Facility: CLINIC | Age: 32
End: 2022-12-13
Payer: COMMERCIAL

## 2022-12-14 ENCOUNTER — PROCEDURE VISIT (OUTPATIENT)
Dept: MATERNAL FETAL MEDICINE | Facility: CLINIC | Age: 32
End: 2022-12-14
Payer: COMMERCIAL

## 2022-12-14 DIAGNOSIS — O99.210 OBESITY IN PREGNANCY: Primary | ICD-10-CM

## 2022-12-14 DIAGNOSIS — D25.9 UTERINE FIBROIDS IN PREGNANCY, DELIVERED WITH POSTPARTUM CONDITION: ICD-10-CM

## 2022-12-14 DIAGNOSIS — Z3A.20 20 WEEKS GESTATION OF PREGNANCY: ICD-10-CM

## 2022-12-14 DIAGNOSIS — O34.10 UTERINE FIBROIDS IN PREGNANCY, DELIVERED WITH POSTPARTUM CONDITION: ICD-10-CM

## 2022-12-14 DIAGNOSIS — Z36.89 ENCOUNTER FOR FETAL ANATOMIC SURVEY: ICD-10-CM

## 2022-12-14 PROCEDURE — 76811 OB US DETAILED SNGL FETUS: CPT | Mod: S$GLB,,, | Performed by: OBSTETRICS & GYNECOLOGY

## 2022-12-14 PROCEDURE — 76811 PR US, OB FETAL EVAL & EXAM, TRANSABDOM,FIRST GESTATION: ICD-10-PCS | Mod: S$GLB,,, | Performed by: OBSTETRICS & GYNECOLOGY

## 2022-12-15 DIAGNOSIS — Z36.2 ENCOUNTER FOR FOLLOW-UP ULTRASOUND OF FETAL ANATOMY: Primary | ICD-10-CM

## 2023-01-04 ENCOUNTER — ROUTINE PRENATAL (OUTPATIENT)
Dept: OBSTETRICS AND GYNECOLOGY | Facility: CLINIC | Age: 33
End: 2023-01-04
Payer: COMMERCIAL

## 2023-01-04 VITALS
BODY MASS INDEX: 43.49 KG/M2 | SYSTOLIC BLOOD PRESSURE: 112 MMHG | DIASTOLIC BLOOD PRESSURE: 82 MMHG | WEIGHT: 261.38 LBS

## 2023-01-04 DIAGNOSIS — M54.9 BACK PAIN AFFECTING PREGNANCY, ANTEPARTUM: ICD-10-CM

## 2023-01-04 DIAGNOSIS — O99.342 ANXIETY IN PREGNANCY IN SECOND TRIMESTER, ANTEPARTUM: ICD-10-CM

## 2023-01-04 DIAGNOSIS — F41.9 ANXIETY IN PREGNANCY IN SECOND TRIMESTER, ANTEPARTUM: ICD-10-CM

## 2023-01-04 DIAGNOSIS — O99.891 BACK PAIN AFFECTING PREGNANCY, ANTEPARTUM: ICD-10-CM

## 2023-01-04 DIAGNOSIS — O23.42 URINARY TRACT INFECTION IN MOTHER DURING SECOND TRIMESTER OF PREGNANCY: ICD-10-CM

## 2023-01-04 DIAGNOSIS — Z3A.23 23 WEEKS GESTATION OF PREGNANCY: Primary | ICD-10-CM

## 2023-01-04 PROCEDURE — 87077 CULTURE AEROBIC IDENTIFY: CPT | Performed by: OBSTETRICS & GYNECOLOGY

## 2023-01-04 PROCEDURE — 87086 URINE CULTURE/COLONY COUNT: CPT | Performed by: OBSTETRICS & GYNECOLOGY

## 2023-01-04 PROCEDURE — 0502F PR SUBSEQUENT PRENATAL CARE: ICD-10-PCS | Mod: CPTII,S$GLB,, | Performed by: OBSTETRICS & GYNECOLOGY

## 2023-01-04 PROCEDURE — 87088 URINE BACTERIA CULTURE: CPT | Performed by: OBSTETRICS & GYNECOLOGY

## 2023-01-04 PROCEDURE — 99999 PR PBB SHADOW E&M-EST. PATIENT-LVL III: ICD-10-PCS | Mod: PBBFAC,,, | Performed by: OBSTETRICS & GYNECOLOGY

## 2023-01-04 PROCEDURE — 99999 PR PBB SHADOW E&M-EST. PATIENT-LVL III: CPT | Mod: PBBFAC,,, | Performed by: OBSTETRICS & GYNECOLOGY

## 2023-01-04 PROCEDURE — 0502F SUBSEQUENT PRENATAL CARE: CPT | Mod: CPTII,S$GLB,, | Performed by: OBSTETRICS & GYNECOLOGY

## 2023-01-04 PROCEDURE — 87186 SC STD MICRODIL/AGAR DIL: CPT | Performed by: OBSTETRICS & GYNECOLOGY

## 2023-01-04 RX ORDER — CITALOPRAM 10 MG/1
10 TABLET ORAL DAILY
Qty: 90 TABLET | Refills: 2 | Status: ON HOLD | OUTPATIENT
Start: 2023-01-04 | End: 2023-04-23 | Stop reason: HOSPADM

## 2023-01-04 NOTE — PROGRESS NOTES
23w4d without major complaints.   1 hour GCT and CBC scheduled.   Having HAs, had stopped her magnesium. Will re-start.   Stopped her aspirin - discussed reasoning, will likely restart.   RTC in 4 weeks for routine PNC.

## 2023-01-05 ENCOUNTER — PATIENT MESSAGE (OUTPATIENT)
Dept: OBSTETRICS AND GYNECOLOGY | Facility: CLINIC | Age: 33
End: 2023-01-05
Payer: COMMERCIAL

## 2023-01-05 ENCOUNTER — TELEPHONE (OUTPATIENT)
Dept: OBSTETRICS AND GYNECOLOGY | Facility: CLINIC | Age: 33
End: 2023-01-05
Payer: COMMERCIAL

## 2023-01-06 LAB — BACTERIA UR CULT: ABNORMAL

## 2023-01-07 ENCOUNTER — PATIENT MESSAGE (OUTPATIENT)
Dept: OTHER | Facility: OTHER | Age: 33
End: 2023-01-07
Payer: COMMERCIAL

## 2023-01-07 RX ORDER — CEPHALEXIN 500 MG/1
500 CAPSULE ORAL 4 TIMES DAILY
Qty: 28 CAPSULE | Refills: 0 | Status: SHIPPED | OUTPATIENT
Start: 2023-01-07 | End: 2023-01-14

## 2023-01-11 ENCOUNTER — PATIENT MESSAGE (OUTPATIENT)
Dept: MATERNAL FETAL MEDICINE | Facility: CLINIC | Age: 33
End: 2023-01-11
Payer: COMMERCIAL

## 2023-01-12 ENCOUNTER — LAB VISIT (OUTPATIENT)
Dept: LAB | Facility: OTHER | Age: 33
End: 2023-01-12
Attending: OBSTETRICS & GYNECOLOGY
Payer: COMMERCIAL

## 2023-01-12 ENCOUNTER — PROCEDURE VISIT (OUTPATIENT)
Dept: MATERNAL FETAL MEDICINE | Facility: CLINIC | Age: 33
End: 2023-01-12
Payer: COMMERCIAL

## 2023-01-12 DIAGNOSIS — Z36.2 ENCOUNTER FOR FOLLOW-UP ULTRASOUND OF FETAL ANATOMY: ICD-10-CM

## 2023-01-12 DIAGNOSIS — Z3A.23 23 WEEKS GESTATION OF PREGNANCY: ICD-10-CM

## 2023-01-12 LAB
BASOPHILS # BLD AUTO: 0.02 K/UL (ref 0–0.2)
BASOPHILS NFR BLD: 0.2 % (ref 0–1.9)
DIFFERENTIAL METHOD: ABNORMAL
EOSINOPHIL # BLD AUTO: 0.2 K/UL (ref 0–0.5)
EOSINOPHIL NFR BLD: 2 % (ref 0–8)
ERYTHROCYTE [DISTWIDTH] IN BLOOD BY AUTOMATED COUNT: 12.8 % (ref 11.5–14.5)
GLUCOSE SERPL-MCNC: 68 MG/DL (ref 70–140)
HCT VFR BLD AUTO: 31.6 % (ref 37–48.5)
HGB BLD-MCNC: 10.7 G/DL (ref 12–16)
IMM GRANULOCYTES # BLD AUTO: 0.06 K/UL (ref 0–0.04)
IMM GRANULOCYTES NFR BLD AUTO: 0.7 % (ref 0–0.5)
LYMPHOCYTES # BLD AUTO: 1.5 K/UL (ref 1–4.8)
LYMPHOCYTES NFR BLD: 17.4 % (ref 18–48)
MCH RBC QN AUTO: 30.9 PG (ref 27–31)
MCHC RBC AUTO-ENTMCNC: 33.9 G/DL (ref 32–36)
MCV RBC AUTO: 91 FL (ref 82–98)
MONOCYTES # BLD AUTO: 0.9 K/UL (ref 0.3–1)
MONOCYTES NFR BLD: 11 % (ref 4–15)
NEUTROPHILS # BLD AUTO: 5.8 K/UL (ref 1.8–7.7)
NEUTROPHILS NFR BLD: 68.7 % (ref 38–73)
NRBC BLD-RTO: 0 /100 WBC
PLATELET # BLD AUTO: 262 K/UL (ref 150–450)
PMV BLD AUTO: 9.7 FL (ref 9.2–12.9)
RBC # BLD AUTO: 3.46 M/UL (ref 4–5.4)
WBC # BLD AUTO: 8.38 K/UL (ref 3.9–12.7)

## 2023-01-12 PROCEDURE — 76816 US MFM PROCEDURE (VIEWPOINT): ICD-10-PCS | Mod: S$GLB,,, | Performed by: OBSTETRICS & GYNECOLOGY

## 2023-01-12 PROCEDURE — 85025 COMPLETE CBC W/AUTO DIFF WBC: CPT | Performed by: OBSTETRICS & GYNECOLOGY

## 2023-01-12 PROCEDURE — 82950 GLUCOSE TEST: CPT | Performed by: OBSTETRICS & GYNECOLOGY

## 2023-01-12 PROCEDURE — 76816 OB US FOLLOW-UP PER FETUS: CPT | Mod: S$GLB,,, | Performed by: OBSTETRICS & GYNECOLOGY

## 2023-01-12 PROCEDURE — 36415 COLL VENOUS BLD VENIPUNCTURE: CPT | Performed by: OBSTETRICS & GYNECOLOGY

## 2023-01-17 ENCOUNTER — PATIENT MESSAGE (OUTPATIENT)
Dept: OBSTETRICS AND GYNECOLOGY | Facility: CLINIC | Age: 33
End: 2023-01-17
Payer: COMMERCIAL

## 2023-01-17 DIAGNOSIS — O99.891 BACK PAIN AFFECTING PREGNANCY, ANTEPARTUM: Primary | ICD-10-CM

## 2023-01-17 DIAGNOSIS — M54.9 BACK PAIN AFFECTING PREGNANCY, ANTEPARTUM: Primary | ICD-10-CM

## 2023-01-19 ENCOUNTER — HOSPITAL ENCOUNTER (OUTPATIENT)
Dept: RADIOLOGY | Facility: HOSPITAL | Age: 33
Discharge: HOME | End: 2023-01-19
Attending: OBSTETRICS & GYNECOLOGY
Payer: COMMERCIAL

## 2023-01-19 DIAGNOSIS — M54.9 BACK PAIN AFFECTING PREGNANCY, ANTEPARTUM: ICD-10-CM

## 2023-01-19 DIAGNOSIS — O99.891 BACK PAIN AFFECTING PREGNANCY, ANTEPARTUM: ICD-10-CM

## 2023-01-19 PROCEDURE — 76770 US EXAM ABDO BACK WALL COMP: CPT | Mod: TC

## 2023-01-19 PROCEDURE — 76770 US RETROPERITONEAL COMPLETE: ICD-10-PCS | Mod: 26,,, | Performed by: RADIOLOGY

## 2023-01-19 PROCEDURE — 76770 US EXAM ABDO BACK WALL COMP: CPT | Mod: 26,,, | Performed by: RADIOLOGY

## 2023-01-20 ENCOUNTER — ROUTINE PRENATAL (OUTPATIENT)
Dept: OBSTETRICS AND GYNECOLOGY | Facility: CLINIC | Age: 33
End: 2023-01-20
Payer: COMMERCIAL

## 2023-01-20 ENCOUNTER — LAB VISIT (OUTPATIENT)
Dept: LAB | Facility: OTHER | Age: 33
End: 2023-01-20
Attending: OBSTETRICS & GYNECOLOGY
Payer: COMMERCIAL

## 2023-01-20 VITALS — WEIGHT: 261 LBS | SYSTOLIC BLOOD PRESSURE: 128 MMHG | DIASTOLIC BLOOD PRESSURE: 98 MMHG | BODY MASS INDEX: 43.44 KG/M2

## 2023-01-20 DIAGNOSIS — O99.891 BACK PAIN AFFECTING PREGNANCY, ANTEPARTUM: Primary | ICD-10-CM

## 2023-01-20 DIAGNOSIS — M54.9 BACK PAIN AFFECTING PREGNANCY, ANTEPARTUM: ICD-10-CM

## 2023-01-20 DIAGNOSIS — M54.9 BACK PAIN AFFECTING PREGNANCY, ANTEPARTUM: Primary | ICD-10-CM

## 2023-01-20 DIAGNOSIS — O99.891 BACK PAIN AFFECTING PREGNANCY, ANTEPARTUM: ICD-10-CM

## 2023-01-20 DIAGNOSIS — Z3A.25 25 WEEKS GESTATION OF PREGNANCY: ICD-10-CM

## 2023-01-20 LAB
AMYLASE SERPL-CCNC: 63 U/L (ref 20–110)
LIPASE SERPL-CCNC: 12 U/L (ref 4–60)

## 2023-01-20 PROCEDURE — 0502F PR SUBSEQUENT PRENATAL CARE: ICD-10-PCS | Mod: CPTII,S$GLB,, | Performed by: OBSTETRICS & GYNECOLOGY

## 2023-01-20 PROCEDURE — 36415 COLL VENOUS BLD VENIPUNCTURE: CPT | Performed by: OBSTETRICS & GYNECOLOGY

## 2023-01-20 PROCEDURE — 83690 ASSAY OF LIPASE: CPT | Performed by: OBSTETRICS & GYNECOLOGY

## 2023-01-20 PROCEDURE — 99999 PR PBB SHADOW E&M-EST. PATIENT-LVL II: ICD-10-PCS | Mod: PBBFAC,,, | Performed by: OBSTETRICS & GYNECOLOGY

## 2023-01-20 PROCEDURE — 99999 PR PBB SHADOW E&M-EST. PATIENT-LVL II: CPT | Mod: PBBFAC,,, | Performed by: OBSTETRICS & GYNECOLOGY

## 2023-01-20 PROCEDURE — 0502F SUBSEQUENT PRENATAL CARE: CPT | Mod: CPTII,S$GLB,, | Performed by: OBSTETRICS & GYNECOLOGY

## 2023-01-20 PROCEDURE — 82150 ASSAY OF AMYLASE: CPT | Performed by: OBSTETRICS & GYNECOLOGY

## 2023-01-20 NOTE — PROGRESS NOTES
25w6d with complaints of back pain which started off as mild and aching, but drastically increased over the course of just a few days. Going from seated to standing is very difficult. Pt tried to wear a belly band for a few days and had minimal improvement.   U/S negative for kidney stones. Amylase and lipase pending.   On exam, pain is in the lower part of back, just above sacrum, left more than right. Pressure improves the pain.   PT appointment Monday as this is likely all musculoskeletal.   No OB complaints.

## 2023-01-21 ENCOUNTER — PATIENT MESSAGE (OUTPATIENT)
Dept: OTHER | Facility: OTHER | Age: 33
End: 2023-01-21
Payer: COMMERCIAL

## 2023-01-23 ENCOUNTER — CLINICAL SUPPORT (OUTPATIENT)
Dept: REHABILITATION | Facility: HOSPITAL | Age: 33
End: 2023-01-23
Attending: OBSTETRICS & GYNECOLOGY
Payer: COMMERCIAL

## 2023-01-23 DIAGNOSIS — M54.9 BACK PAIN AFFECTING PREGNANCY, ANTEPARTUM: ICD-10-CM

## 2023-01-23 DIAGNOSIS — M54.9 BACK PAIN IN PREGNANCY: ICD-10-CM

## 2023-01-23 DIAGNOSIS — O99.891 BACK PAIN IN PREGNANCY: ICD-10-CM

## 2023-01-23 DIAGNOSIS — O99.891 BACK PAIN AFFECTING PREGNANCY, ANTEPARTUM: ICD-10-CM

## 2023-01-23 PROCEDURE — 97162 PT EVAL MOD COMPLEX 30 MIN: CPT

## 2023-01-23 PROCEDURE — 97110 THERAPEUTIC EXERCISES: CPT

## 2023-01-23 NOTE — PLAN OF CARE
[facility]   Physical Therapy Initial Evaluation     Date: 1/23/2023   Name: Carie Nick [place]  Clinic Number: [id number]    Therapy Diagnosis:   Encounter Diagnoses   Name Primary?    Back pain affecting pregnancy, antepartum     Back pain in pregnancy      Physician: Rose Cordova*    Physician Orders: PT Eval and Treat   Medical Diagnosis from Referral: O99.891,M54.9 (ICD-10-CM) - Back pain affecting pregnancy, antepartum  Evaluation Date: 1/23/2023  Authorization Period Expiration: 1/4/2024  Plan of Care Expiration: 3/10/2023  Progress Note Due: 2/24/2023  Visit # / Visits authorized: 1/1   FOTO: 1/5    Precautions: Standard and Pregnancy      Time In: 8:45 am  Time Out: 9:20 am  Total Appointment Time (timed & untimed codes): 35 minutes      SUBJECTIVE     Date of onset: had a MD appt on 1/4/23 which she reported starting to have low back pain    History of current condition - Carie reports: her LBP is constant with some intermittent sharp/shooting pains down BLE when moving from some positions too fast. Pt reports bilateral shooting pains are at posterior LE. Pt reported LBP went from uncomfortable to very painful 1 week ago. Pt reports pain increases the most when getting in and out of bed, standing after prolonged sitting, and stair navigation (more pain with ascent then descent). Pt stated she is 27 weeks pregnant and is negative for all tests from the MD to rule out other issues with pregnancy. Pt sees MD again on 2/14/23.     Falls: Pt reports falling up the steps twice in August and October (during first trimester).     Imaging,  no    Prior Therapy: no  Social History: Lives with  and 2 kids with 2 VALE in 2 story home  Occupation: Teacher, a lot of standing and walking   Prior Level of Function: Pt was independent with all ADLs prior to increase in back pain  Current Level of Function: Limitation with dressing due to pain in low back     Pain:  Current  "7/10, worst 10/10, best 5/10   Location: bilateral back, intermittent pain in posterior B LE   Description:  Dull, Sharp, and Shooting  Aggravating Factors: Sitting, Standing, and Getting out of bed/chair  Easing Factors: heating pad and rest, leaning forward onto a surface/desk helps some    Patients goals: "figure out ways to be less uncomfortable"     Medical History:   Past Medical History:   Diagnosis Date    Abnormal Pap smear 1/3/2013    Allergy     Anxiety     Panic attacks        Surgical History:   Carie Estrada  has no past surgical history on file.    Medications:   Carie has a current medication list which includes the following prescription(s): citalopram and prenatal 25/iron fum/folic/dha.    Allergies:   Review of patient's allergies indicates:  No Known Allergies       OBJECTIVE     Lumbar ROM deferred today due to pregnancy    Hip Right Right Left Left Pain/Dysfunction with Movement    AROM MMT AROM MMT    Flexion WFL 3+/5 WFL 4-/5    Extension WFL 4+/5 WFL 4+/5 Tested in Modified seated position   Abduction WFL 4/5 WFL 4/5 Tested in Modified seated position   Adduction WFL 4/5 WFL 4/5 Tested in Modified seated position. Pt reported discomfort      Knee flex L- 4+/5, R- 4+/5  Knee ext L- 4+/5, R-4/5    Limitation/Restriction for FOTO Lumbar Survey    Therapist reviewed FOTO scores for Carie Estrada on 1/23/2023.   FOTO documents entered into [id number] - see Media section.    Limitation Score: 57%         TREATMENT     Total Treatment time (time-based codes) separate from Evaluation: 8 minutes      Carie received the treatments listed below:      therapeutic exercises to develop strength and core stabilization for 8 minutes including:  PPT 1x10 seated, 1x10 standing  Clamshells 1x5 ea (deferred due to pain)  Scapular retractions 1x10 2"  Stdg Hip abd 1x10 B      PATIENT EDUCATION AND HOME EXERCISES     Education provided:   - HEP - pt educated to stop exercises at home if they " increase pain or discomfort. - pt verbalized understanding  - Educate on importance of staying hydrated before, during, and after PT - pt verbalized understanding    Written Home Exercises Provided: yes. Exercises were reviewed and Carie was able to demonstrate them prior to the end of the session.  Carie demonstrated good  understanding of the education provided. See EMR under Patient Instructions for exercises provided during therapy sessions.    ASSESSMENT     Carie is a 32 y.o. female referred to outpatient Physical Therapy with a medical diagnosis of back pain affecting pregnancy. Pt presents 27 weeks pregnant with c/o B back pain and intermittent pain in posterior B LE affecting pt's functional mobility and ability to perform ADLs. Patient presents with decreased hip and knee strength and reports of discomfort with most positions.    Patient prognosis is Fair.   Patient will benefit from skilled outpatient Physical Therapy to address the deficits stated above and in the chart below, provide patient /family education, and to maximize patientt's level of independence.     Plan of care discussed with patient: Yes  Patient's spiritual, cultural and educational needs considered and patient is agreeable to the plan of care and goals as stated below:     Anticipated Barriers for therapy: pain severity, work schedule    Medical Necessity is demonstrated by the following  History  Co-morbidities and personal factors that may impact the plan of care Co-morbidities:   Pregnancy    Personal Factors:   no deficits     moderate   Examination  Body Structures and Functions, activity limitations and participation restrictions that may impact the plan of care Body Regions:   back  lower extremities    Body Systems:    ROM  strength    Participation Restrictions:   Pt limited by pain to do job and home tasks without increased time to rest between due to pain.     Activity limitations:   Learning and applying knowledge  no  deficits    General Tasks and Commands  no deficits    Communication  no deficits    Mobility  lifting and carrying objects    Self care  dressing    Domestic Life  doing house work (cleaning house, washing dishes, laundry)    Interactions/Relationships  no deficits    Life Areas  no deficits    Community and Social Life  no deficits         moderate   Clinical Presentation evolving clinical presentation with changing clinical characteristics moderate   Decision Making/ Complexity Score: moderate     Goals:  Short Term Goals: 3 weeks   Pt will report compliance with HEP to supplement PT progress.   Pt will report instances of pain <5/10 in last week in order to show improvement in mobility and QoL.     Long Term Goals: 6 weeks   Pt will receive 47% on FOTO in order to show improvement in functional mobility.   Pt will demonstrate increased strength by 1/2 MMT grade in bilateral hips in order to improve functional strength.   Pt will be able to tolerate side lying clamshells to help increase B hip strength for improved functional mobility.  Pt will report decreased difficulty and low back pain with dressing (putting on pants) in order to show improved functional mobility.   Pt will report back pain </= 5/10 at worst in order to be able to perform ADLs with less difficulty     PLAN   Plan of care Certification: 1/23/2023 to 3/10/2023.    Outpatient Physical Therapy 2 times weekly for 6 weeks to include the following interventions: Manual Therapy, Moist Heat/ Ice, Neuromuscular Re-ed, Patient Education, Therapeutic Activities, Therapeutic Exercise, and dry needling, IASTM, kinesiotaping and other modalities as needed .     Nicole Bolanos, SPT    I certify that I was present in the room directing the student in service delivery and guiding them using my skilled judgment. As the co-signing therapist I have reviewed the students documentation and am responsible for the treatment, assessment, and plan.     Karen Guevara,  PT      I CERTIFY THE NEED FOR THESE SERVICES FURNISHED UNDER THIS PLAN OF TREATMENT AND WHILE UNDER MY CARE   Physician's comments:     Physician's Signature: ___________________________________________________

## 2023-01-27 ENCOUNTER — PATIENT MESSAGE (OUTPATIENT)
Dept: OBSTETRICS AND GYNECOLOGY | Facility: CLINIC | Age: 33
End: 2023-01-27
Payer: COMMERCIAL

## 2023-02-02 ENCOUNTER — CLINICAL SUPPORT (OUTPATIENT)
Dept: REHABILITATION | Facility: HOSPITAL | Age: 33
End: 2023-02-02
Attending: OBSTETRICS & GYNECOLOGY
Payer: COMMERCIAL

## 2023-02-02 DIAGNOSIS — O99.891 BACK PAIN IN PREGNANCY: Primary | ICD-10-CM

## 2023-02-02 DIAGNOSIS — M54.9 BACK PAIN IN PREGNANCY: Primary | ICD-10-CM

## 2023-02-02 PROCEDURE — 97110 THERAPEUTIC EXERCISES: CPT

## 2023-02-02 NOTE — PROGRESS NOTES
"  [facility]   Physical Therapy Treatment Note     Name: Carie Nick [place]  Clinic Number: [id number]    Therapy Diagnosis:   Encounter Diagnosis   Name Primary?    Back pain in pregnancy Yes     Physician: Rose Cordova*    Visit Date: 2/2/2023    Physician Orders: PT Eval and Treat   Medical Diagnosis from Referral: O99.891,M54.9 (ICD-10-CM) - Back pain affecting pregnancy, antepartum  Evaluation Date: 1/23/2023  Authorization Period Expiration: 12/31/2023  Plan of Care Expiration: 3/10/2023  Progress Note Due: 2/24/2023  Visit # / Visits authorized: 1/1, 1/20  FOTO: 1/5      Time In: 5:30pm  Time Out: 6:02 pm  Total Billable Time: 32 minutes    SUBJECTIVE     Pt reports: she feels worse then initial evaluation day but that when she does the HEP exercises she feels some momentary relief.   She was compliant with home exercise program.  Response to previous treatment: last visit initial evaluation  Functional change: none noted    Pain: 7/10  Location: bilateral low back      OBJECTIVE     Objective Measures updated at progress report unless specified.     Treatment     Carie received the treatments listed below:      therapeutic exercises to develop strength, posture, and core stabilization for 32 minutes including:  PPT 3x10 seated 3"  Scapular retractions 1x10 2"  Stdg Hip abd 3x10 B RTB  Stdg hip ext 3x10 B RTB  Seated clamshells 3x10   SB walkouts 1x10 3"  B UE sh ER banded RTB 3x10   Seated field goals 3x10   Banded HA 3x10   Seated hamstring stretch 3x30s  Cervical/thoracic stretch 3x30s      Not performed:  Clamshells 1x5 ea (deferred due to pain)      Patient Education and Home Exercises     Home Exercises Provided and Patient Education Provided     Education provided:   - HEP    Written Home Exercises Provided: Patient instructed to cont prior HEP. Exercises were reviewed and Carie was able to demonstrate them prior to the end of the session.  Carie " demonstrated good  understanding of the education provided. See EMR under Patient Instructions for exercises provided during therapy sessions    ASSESSMENT     Pt reported no pain with exercises today. Pt reported discomfort at stomach during SB walkouts due to not being able to open legs wide enough to get belly through her legs. Pt able to do hamstring stretch without having to hinge at hips too far due to feeling stretch with small lean. Pt reported cervical/thoracic stretch felt good. Pt reported no increased pain at end of therapy session.     Carie Is progressing well towards her goals.   Pt prognosis is Fair.     Pt will continue to benefit from skilled outpatient physical therapy to address the deficits listed in the problem list box on initial evaluation, provide pt/family education and to maximize pt's level of independence in the home and community environment.     Pt's spiritual, cultural and educational needs considered and pt agreeable to plan of care and goals.     Anticipated barriers to physical therapy: pain severity, work schedule    Goals:   Short Term Goals: 3 weeks   Pt will report compliance with HEP to supplement PT progress.   Pt will report instances of pain <5/10 in last week in order to show improvement in mobility and QoL.      Long Term Goals: 6 weeks   Pt will receive 47% on FOTO in order to show improvement in functional mobility.   Pt will demonstrate increased strength by 1/2 MMT grade in bilateral hips in order to improve functional strength.   Pt will be able to tolerate side lying clamshells to help increase B hip strength for improved functional mobility.  Pt will report decreased difficulty and low back pain with dressing (putting on pants) in order to show improved functional mobility.   Pt will report back pain </= 5/10 at worst in order to be able to perform ADLs with less difficulty     PLAN     Continue POC, progressing as tolerated    Nicole Bolanos, SPT

## 2023-02-04 ENCOUNTER — PATIENT MESSAGE (OUTPATIENT)
Dept: OTHER | Facility: OTHER | Age: 33
End: 2023-02-04
Payer: COMMERCIAL

## 2023-02-09 ENCOUNTER — CLINICAL SUPPORT (OUTPATIENT)
Dept: REHABILITATION | Facility: HOSPITAL | Age: 33
End: 2023-02-09
Attending: OBSTETRICS & GYNECOLOGY
Payer: COMMERCIAL

## 2023-02-09 DIAGNOSIS — O99.891 BACK PAIN IN PREGNANCY: Primary | ICD-10-CM

## 2023-02-09 DIAGNOSIS — M54.9 BACK PAIN IN PREGNANCY: Primary | ICD-10-CM

## 2023-02-09 PROCEDURE — 97110 THERAPEUTIC EXERCISES: CPT

## 2023-02-09 NOTE — PROGRESS NOTES
"  [facility]   Physical Therapy Treatment Note     Name: Carie Nick [place]  Clinic Number: [id number]    Therapy Diagnosis:   Encounter Diagnosis   Name Primary?    Back pain in pregnancy Yes     Physician: Rose Cordova*    Visit Date: 2/9/2023    Physician Orders: PT Eval and Treat   Medical Diagnosis from Referral: O99.891,M54.9 (ICD-10-CM) - Back pain affecting pregnancy, antepartum  Evaluation Date: 1/23/2023  Authorization Period Expiration: 12/31/2023  Plan of Care Expiration: 3/10/2023  Progress Note Due: 2/24/2023  Visit # / Visits authorized: 1/1, 2/20  FOTO: 3/5      Time In: 4:50 pm  Time Out: 5:32 pm  Total Billable Time: 32 minutes    SUBJECTIVE     Pt reports: she felt soreness in upper body after last visit but no inc'd pain in lower back  She was compliant with home exercise program.  Response to previous treatment: soreness  Functional change: none noted    Pain: 7/10  Location: bilateral low back      OBJECTIVE     Objective Measures updated at progress report unless specified.     Treatment     Carie received the treatments listed below:      therapeutic exercises to develop strength, posture, and core stabilization for 42 minutes including:  Nu Step 10' supervised   PPT 3x10 seated 3"  Scapular retractions 1x10 2"  Stdg Hip abd 3x10 B RTB  Stdg hip ext 3x10 B RTB  Seated clamshells 3x10 RTB  B UE sh ER banded RTB 3x10   Banded HA 3x10 RTB  Seated hamstring stretch 3x30s  Cervical/thoracic stretch 3x30s      Not performed:  Seated field goals 3x10   SB walkouts 1x10 3"  Clamshells 1x5 ea (deferred due to pain)      Patient Education and Home Exercises     Home Exercises Provided and Patient Education Provided     Education provided:   - HEP    Written Home Exercises Provided: Patient instructed to cont prior HEP. Exercises were reviewed and Carie was able to demonstrate them prior to the end of the session.  Carie demonstrated good  understanding " of the education provided. See EMR under Patient Instructions for exercises provided during therapy sessions    ASSESSMENT     Pt reported no pain or difficulty with exercises today. Pt did well with increased resistance on some exercises today. Pt reported no increased pain at end of therapy session.     Carie Is progressing well towards her goals.   Pt prognosis is Fair.     Pt will continue to benefit from skilled outpatient physical therapy to address the deficits listed in the problem list box on initial evaluation, provide pt/family education and to maximize pt's level of independence in the home and community environment.     Pt's spiritual, cultural and educational needs considered and pt agreeable to plan of care and goals.     Anticipated barriers to physical therapy: pain severity, work schedule    Goals:   Short Term Goals: 3 weeks   Pt will report compliance with HEP to supplement PT progress.   Pt will report instances of pain <5/10 in last week in order to show improvement in mobility and QoL.      Long Term Goals: 6 weeks   Pt will receive 47% on FOTO in order to show improvement in functional mobility.   Pt will demonstrate increased strength by 1/2 MMT grade in bilateral hips in order to improve functional strength.   Pt will be able to tolerate side lying clamshells to help increase B hip strength for improved functional mobility.  Pt will report decreased difficulty and low back pain with dressing (putting on pants) in order to show improved functional mobility.   Pt will report back pain </= 5/10 at worst in order to be able to perform ADLs with less difficulty     PLAN     Continue POC, progressing as tolerated    Nicole Bolanos, SPT

## 2023-02-14 ENCOUNTER — CLINICAL SUPPORT (OUTPATIENT)
Dept: OBSTETRICS AND GYNECOLOGY | Facility: CLINIC | Age: 33
End: 2023-02-14
Payer: COMMERCIAL

## 2023-02-14 ENCOUNTER — ROUTINE PRENATAL (OUTPATIENT)
Dept: OBSTETRICS AND GYNECOLOGY | Facility: CLINIC | Age: 33
End: 2023-02-14
Payer: COMMERCIAL

## 2023-02-14 VITALS
DIASTOLIC BLOOD PRESSURE: 80 MMHG | SYSTOLIC BLOOD PRESSURE: 122 MMHG | WEIGHT: 264.13 LBS | BODY MASS INDEX: 43.95 KG/M2

## 2023-02-14 DIAGNOSIS — F41.9 ANXIETY IN PREGNANCY IN SECOND TRIMESTER, ANTEPARTUM: ICD-10-CM

## 2023-02-14 DIAGNOSIS — O99.342 ANXIETY IN PREGNANCY IN SECOND TRIMESTER, ANTEPARTUM: ICD-10-CM

## 2023-02-14 DIAGNOSIS — M54.9 BACK PAIN AFFECTING PREGNANCY, ANTEPARTUM: Primary | ICD-10-CM

## 2023-02-14 DIAGNOSIS — Z3A.29 29 WEEKS GESTATION OF PREGNANCY: ICD-10-CM

## 2023-02-14 DIAGNOSIS — Z23 NEED FOR TDAP VACCINATION: Primary | ICD-10-CM

## 2023-02-14 DIAGNOSIS — O99.891 BACK PAIN AFFECTING PREGNANCY, ANTEPARTUM: Primary | ICD-10-CM

## 2023-02-14 PROCEDURE — 90715 TDAP VACCINE 7 YRS/> IM: CPT | Mod: S$GLB,,, | Performed by: OBSTETRICS & GYNECOLOGY

## 2023-02-14 PROCEDURE — 0502F SUBSEQUENT PRENATAL CARE: CPT | Mod: CPTII,S$GLB,, | Performed by: OBSTETRICS & GYNECOLOGY

## 2023-02-14 PROCEDURE — 90471 IMMUNIZATION ADMIN: CPT | Mod: S$GLB,,, | Performed by: OBSTETRICS & GYNECOLOGY

## 2023-02-14 PROCEDURE — 99999 PR PBB SHADOW E&M-EST. PATIENT-LVL II: CPT | Mod: PBBFAC,,, | Performed by: OBSTETRICS & GYNECOLOGY

## 2023-02-14 PROCEDURE — 99999 PR PBB SHADOW E&M-EST. PATIENT-LVL II: ICD-10-PCS | Mod: PBBFAC,,, | Performed by: OBSTETRICS & GYNECOLOGY

## 2023-02-14 PROCEDURE — 99999 PR PBB SHADOW E&M-EST. PATIENT-LVL I: CPT | Mod: PBBFAC,,,

## 2023-02-14 PROCEDURE — 90471 PR IMMUNIZ ADMIN,1 SINGLE/COMB VAC/TOXOID: ICD-10-PCS | Mod: S$GLB,,, | Performed by: OBSTETRICS & GYNECOLOGY

## 2023-02-14 PROCEDURE — 99999 PR PBB SHADOW E&M-EST. PATIENT-LVL I: ICD-10-PCS | Mod: PBBFAC,,,

## 2023-02-14 PROCEDURE — 90715 PR TDAP VACCINE >7 YO, IM: ICD-10-PCS | Mod: S$GLB,,, | Performed by: OBSTETRICS & GYNECOLOGY

## 2023-02-14 PROCEDURE — 0502F PR SUBSEQUENT PRENATAL CARE: ICD-10-PCS | Mod: CPTII,S$GLB,, | Performed by: OBSTETRICS & GYNECOLOGY

## 2023-02-14 NOTE — PROGRESS NOTES
29w3d without major complaints.   Has been going to PT for back pain and reports that this has not improved her symptoms.  Tdap given today.   RTC in 3 weeks for visit with U/S for growth.   Referral to PM&R placed.

## 2023-02-14 NOTE — PROGRESS NOTES
Ordering Provider: Dr. Cordova    Pt received Tdap to RIGHT.  Pt tolerated injection well.  Pt was instructed to wait 15 minutes to monitor for signs and symptoms of any reactions.  Pt has no further questions, comments, or concerns at this time.

## 2023-02-15 ENCOUNTER — PATIENT MESSAGE (OUTPATIENT)
Dept: OBSTETRICS AND GYNECOLOGY | Facility: CLINIC | Age: 33
End: 2023-02-15
Payer: COMMERCIAL

## 2023-02-15 DIAGNOSIS — O99.891 BACK PAIN AFFECTING PREGNANCY, ANTEPARTUM: Primary | ICD-10-CM

## 2023-02-15 DIAGNOSIS — M54.9 BACK PAIN AFFECTING PREGNANCY, ANTEPARTUM: Primary | ICD-10-CM

## 2023-02-15 RX ORDER — CYCLOBENZAPRINE HCL 5 MG
5 TABLET ORAL 3 TIMES DAILY PRN
Qty: 12 TABLET | Refills: 0 | Status: SHIPPED | OUTPATIENT
Start: 2023-02-15 | End: 2023-02-25

## 2023-02-18 ENCOUNTER — PATIENT MESSAGE (OUTPATIENT)
Dept: OTHER | Facility: OTHER | Age: 33
End: 2023-02-18
Payer: COMMERCIAL

## 2023-02-23 NOTE — PROGRESS NOTES
Subjective:      Patient ID: Carie Estrada is a 32 y.o. female.    Chief Complaint: No chief complaint on file.    Ms Estrada is a 31 yo female sent in consultation by Dr. Cordova for evaluation of low back pain in pregnancy. She has left glute pain that is constant and will shoot up and down the back.  It has been present for the past month. She has been going to PT for the last month, she has been the past 3 visits.  She has been doing some pelvic tilts.  She has some upper back exercises and she was sore the next day.  She has a hard time moving at times.  She has a hard time changing positions.  She has a hard time standing.  The pain seems to be getting worse.  Pain is 6/10 now, worst 10/10 shooting pain with standing, but not every time, best 3/10 lying down on side.  She has done 3 visits of PT, heat, and theragun. She sleeps on heating pad and she wears a heat pad.      Past Medical History:  1/3/2013: Abnormal Pap smear  No date: Allergy  No date: Anxiety  No date: Panic attacks    No past surgical history on file.    Review of patient's family history indicates:      Social History    Socioeconomic History      Marital status:     Tobacco Use      Smoking status: Never      Smokeless tobacco: Never    Substance and Sexual Activity      Alcohol use: Yes        Comment: socially      Drug use: No      Sexual activity: Yes        Partners: Male        Birth control/protection: OCP      Current Outpatient Medications:  citalopram (CELEXA) 10 MG tablet, Take 1 tablet (10 mg total) by mouth once daily., Disp: 90 tablet, Rfl: 2  cyclobenzaprine (FLEXERIL) 5 MG tablet, Take 1 tablet (5 mg total) by mouth 3 (three) times daily as needed for Muscle spasms., Disp: 12 tablet, Rfl: 0  prenatal 25/iron fum/folic/dha (PRENATAL-1 ORAL), Take by mouth., Disp: , Rfl:     No current facility-administered medications for this visit.      Review of patient's allergies indicates:  No Known  Allergies      Review of Systems   Constitutional: Negative for weight gain and weight loss.   Cardiovascular:  Negative for chest pain.   Respiratory:  Negative for shortness of breath.    Musculoskeletal:  Positive for back pain. Negative for joint pain and joint swelling.   Gastrointestinal:  Negative for abdominal pain, bowel incontinence, nausea and vomiting.   Genitourinary:  Negative for bladder incontinence.   Neurological:  Negative for numbness and paresthesias.       Objective:        General: Carie is well-developed, well-nourished, appears stated age, in no acute distress, alert and oriented to time, place and person.     General    Vitals reviewed.  Constitutional: She is oriented to person, place, and time. She appears well-developed and well-nourished.   HENT:   Head: Normocephalic and atraumatic.   Pulmonary/Chest: Effort normal.   Neurological: She is alert and oriented to person, place, and time.   Psychiatric: She has a normal mood and affect. Her behavior is normal. Judgment and thought content normal.     General Musculoskeletal Exam   Gait: normal     Right Ankle/Foot Exam     Tests   Heel Walk: able to perform  Tiptoe Walk: able to perform    Left Ankle/Foot Exam     Tests   Heel Walk: able to perform  Tiptoe Walk: able to perform  Back (L-Spine & T-Spine) / Neck (C-Spine) Exam     Tenderness Left paramedian tenderness of the Sacrum.     Back (L-Spine & T-Spine) Range of Motion   Extension:  20   Flexion:  90   Lateral bend right:  20   Lateral bend left:  20   Rotation right:  30   Rotation left:  30     Spinal Sensation   Right Side Sensation  C-Spine Level: normal   L-Spine Level: normal  S-Spine Level: normal  Left Side Sensation  C-Spine Level: normal  L-Spine Level: normal  S-Spine Level: normal    Back (L-Spine & T-Spine) Tests   Right Side Tests  Straight leg raise:        Sitting SLR: > 70 degrees    Left Side Tests  Straight leg raise:       Sitting SLR: > 70 degrees      Other    She has no scoliosis .  Spinal Kyphosis:  Absent      Muscle Strength   Right Upper Extremity   Biceps: 5/5   Deltoid:  5/5  Triceps:  5/5  Wrist extension: 5/5   Finger Flexors:  5/5  Left Upper Extremity  Biceps: 5/5   Deltoid:  5/5  Triceps:  5/5  Wrist extension: 5/5   Finger Flexors:  5/5  Right Lower Extremity   Hip Flexion: 5/5   Quadriceps:  5/5   Anterior tibial:  5/5   EHL:  5/5  Left Lower Extremity   Hip Flexion: 5/5   Quadriceps:  5/5   Anterior tibial:  5/5   EHL:  5/5    Reflexes     Left Side  Biceps:  2+  Triceps:  2+  Brachioradialis:  2+  Achilles:  2+  Left Freeman's Sign:  Absent  Babinski Sign:  absent  Quadriceps:  2+    Right Side   Biceps:  2+  Triceps:  2+  Brachioradialis:  2+  Achilles:  2+  Right Freeman's Sign:  absent  Babinski Sign:  absent  Quadriceps:  2+    Vascular Exam     Right Pulses        Carotid:                  2+    Left Pulses        Carotid:                  2+            Assessment:       1. Back pain affecting pregnancy, antepartum           Plan:          We discussed back pain and the nature of back pain.  We discussed back pain with pregnancy and si joints.  We discussed belly band and si joint belt  We discussed posture sitting and the importance of trying to sit better.   We discussed sitting better and with a curve in the lower back to help transition.  We also discussed watching posture once baby is here  We discussed the benefits of therapy and exercise and continuing to move.  She only went to 3 visits.  We discussed it takes more time.  I encourage her to go back.  Her upper body was sore after arm exercises. We discussed muscle work can help.    We discussed currently do not recommend imagine, and would not recommend meds  She should return to PT, she will call if she needs new orders  Si joint belt or belly band  RTC PRN      More than 50% of the total time  of 45 minutes was spent face to face in counseling on diagnosis and treatment options. I also  counseled patient  on common and most usual side effect of prescribed medications.  I reviewed Primary care , and other specialty's notes to better coordinate patient's care. All questions were answered, and patient voiced understanding.         Follow-up: No follow-ups on file. If there are any questions prior to this, the patient was instructed to contact the office.

## 2023-02-27 ENCOUNTER — ROUTINE PRENATAL (OUTPATIENT)
Dept: OBSTETRICS AND GYNECOLOGY | Facility: CLINIC | Age: 33
End: 2023-02-27
Payer: COMMERCIAL

## 2023-02-27 ENCOUNTER — TELEPHONE (OUTPATIENT)
Dept: OBSTETRICS AND GYNECOLOGY | Facility: CLINIC | Age: 33
End: 2023-02-27

## 2023-02-27 ENCOUNTER — OFFICE VISIT (OUTPATIENT)
Dept: SPINE | Facility: CLINIC | Age: 33
End: 2023-02-27
Attending: PHYSICAL MEDICINE & REHABILITATION
Payer: COMMERCIAL

## 2023-02-27 ENCOUNTER — LAB VISIT (OUTPATIENT)
Dept: LAB | Facility: HOSPITAL | Age: 33
End: 2023-02-27
Attending: OBSTETRICS & GYNECOLOGY
Payer: COMMERCIAL

## 2023-02-27 VITALS
BODY MASS INDEX: 42.82 KG/M2 | RESPIRATION RATE: 18 BRPM | SYSTOLIC BLOOD PRESSURE: 132 MMHG | HEIGHT: 65 IN | DIASTOLIC BLOOD PRESSURE: 91 MMHG | WEIGHT: 257 LBS | HEART RATE: 102 BPM

## 2023-02-27 VITALS
DIASTOLIC BLOOD PRESSURE: 86 MMHG | BODY MASS INDEX: 42.98 KG/M2 | SYSTOLIC BLOOD PRESSURE: 124 MMHG | WEIGHT: 258.25 LBS

## 2023-02-27 DIAGNOSIS — O16.3 ELEVATED BLOOD PRESSURE AFFECTING PREGNANCY IN THIRD TRIMESTER, ANTEPARTUM: Primary | ICD-10-CM

## 2023-02-27 DIAGNOSIS — M54.9 BACK PAIN AFFECTING PREGNANCY, ANTEPARTUM: ICD-10-CM

## 2023-02-27 DIAGNOSIS — M53.3 SI (SACROILIAC) JOINT DYSFUNCTION: Primary | ICD-10-CM

## 2023-02-27 DIAGNOSIS — O99.891 BACK PAIN AFFECTING PREGNANCY, ANTEPARTUM: ICD-10-CM

## 2023-02-27 DIAGNOSIS — O16.3 ELEVATED BLOOD PRESSURE AFFECTING PREGNANCY IN THIRD TRIMESTER, ANTEPARTUM: ICD-10-CM

## 2023-02-27 DIAGNOSIS — Z3A.31 31 WEEKS GESTATION OF PREGNANCY: ICD-10-CM

## 2023-02-27 DIAGNOSIS — Z3A.32 32 WEEKS GESTATION OF PREGNANCY: Primary | ICD-10-CM

## 2023-02-27 LAB
ALBUMIN SERPL BCP-MCNC: 3.1 G/DL (ref 3.5–5.2)
ALP SERPL-CCNC: 116 U/L (ref 55–135)
ALT SERPL W/O P-5'-P-CCNC: 14 U/L (ref 10–44)
ANION GAP SERPL CALC-SCNC: 8 MMOL/L (ref 8–16)
AST SERPL-CCNC: 16 U/L (ref 10–40)
BASOPHILS # BLD AUTO: 0.03 K/UL (ref 0–0.2)
BASOPHILS NFR BLD: 0.3 % (ref 0–1.9)
BILIRUB SERPL-MCNC: 0.3 MG/DL (ref 0.1–1)
BUN SERPL-MCNC: 5 MG/DL (ref 6–20)
CALCIUM SERPL-MCNC: 9.4 MG/DL (ref 8.7–10.5)
CHLORIDE SERPL-SCNC: 105 MMOL/L (ref 95–110)
CO2 SERPL-SCNC: 21 MMOL/L (ref 23–29)
CREAT SERPL-MCNC: 0.6 MG/DL (ref 0.5–1.4)
CREAT UR-MCNC: 223 MG/DL (ref 15–325)
DIFFERENTIAL METHOD: ABNORMAL
EOSINOPHIL # BLD AUTO: 0.1 K/UL (ref 0–0.5)
EOSINOPHIL NFR BLD: 1.1 % (ref 0–8)
ERYTHROCYTE [DISTWIDTH] IN BLOOD BY AUTOMATED COUNT: 13.2 % (ref 11.5–14.5)
EST. GFR  (NO RACE VARIABLE): >60 ML/MIN/1.73 M^2
GLUCOSE SERPL-MCNC: 72 MG/DL (ref 70–110)
HCT VFR BLD AUTO: 34.3 % (ref 37–48.5)
HGB BLD-MCNC: 10.9 G/DL (ref 12–16)
IMM GRANULOCYTES # BLD AUTO: 0.08 K/UL (ref 0–0.04)
IMM GRANULOCYTES NFR BLD AUTO: 0.9 % (ref 0–0.5)
LYMPHOCYTES # BLD AUTO: 1.4 K/UL (ref 1–4.8)
LYMPHOCYTES NFR BLD: 14.7 % (ref 18–48)
MCH RBC QN AUTO: 29.7 PG (ref 27–31)
MCHC RBC AUTO-ENTMCNC: 31.8 G/DL (ref 32–36)
MCV RBC AUTO: 94 FL (ref 82–98)
MONOCYTES # BLD AUTO: 0.8 K/UL (ref 0.3–1)
MONOCYTES NFR BLD: 8.9 % (ref 4–15)
NEUTROPHILS # BLD AUTO: 6.8 K/UL (ref 1.8–7.7)
NEUTROPHILS NFR BLD: 74.1 % (ref 38–73)
NRBC BLD-RTO: 0 /100 WBC
PLATELET # BLD AUTO: 289 K/UL (ref 150–450)
PMV BLD AUTO: 9.9 FL (ref 9.2–12.9)
POTASSIUM SERPL-SCNC: 4 MMOL/L (ref 3.5–5.1)
PROT SERPL-MCNC: 6.6 G/DL (ref 6–8.4)
PROT UR-MCNC: 16 MG/DL (ref 0–15)
PROT/CREAT UR: 0.07 MG/G{CREAT} (ref 0–0.2)
RBC # BLD AUTO: 3.67 M/UL (ref 4–5.4)
SODIUM SERPL-SCNC: 134 MMOL/L (ref 136–145)
WBC # BLD AUTO: 9.23 K/UL (ref 3.9–12.7)

## 2023-02-27 PROCEDURE — 3080F DIAST BP >= 90 MM HG: CPT | Mod: CPTII,S$GLB,, | Performed by: PHYSICAL MEDICINE & REHABILITATION

## 2023-02-27 PROCEDURE — 0502F PR SUBSEQUENT PRENATAL CARE: ICD-10-PCS | Mod: CPTII,S$GLB,, | Performed by: OBSTETRICS & GYNECOLOGY

## 2023-02-27 PROCEDURE — 85025 COMPLETE CBC W/AUTO DIFF WBC: CPT | Performed by: OBSTETRICS & GYNECOLOGY

## 2023-02-27 PROCEDURE — 3075F PR MOST RECENT SYSTOLIC BLOOD PRESS GE 130-139MM HG: ICD-10-PCS | Mod: CPTII,S$GLB,, | Performed by: PHYSICAL MEDICINE & REHABILITATION

## 2023-02-27 PROCEDURE — 3080F PR MOST RECENT DIASTOLIC BLOOD PRESSURE >= 90 MM HG: ICD-10-PCS | Mod: CPTII,S$GLB,, | Performed by: PHYSICAL MEDICINE & REHABILITATION

## 2023-02-27 PROCEDURE — 1159F MED LIST DOCD IN RCRD: CPT | Mod: CPTII,S$GLB,, | Performed by: PHYSICAL MEDICINE & REHABILITATION

## 2023-02-27 PROCEDURE — 99203 OFFICE O/P NEW LOW 30 MIN: CPT | Mod: S$GLB,,, | Performed by: PHYSICAL MEDICINE & REHABILITATION

## 2023-02-27 PROCEDURE — 1159F PR MEDICATION LIST DOCUMENTED IN MEDICAL RECORD: ICD-10-PCS | Mod: CPTII,S$GLB,, | Performed by: PHYSICAL MEDICINE & REHABILITATION

## 2023-02-27 PROCEDURE — 99999 PR PBB SHADOW E&M-EST. PATIENT-LVL II: ICD-10-PCS | Mod: PBBFAC,,, | Performed by: OBSTETRICS & GYNECOLOGY

## 2023-02-27 PROCEDURE — 1160F PR REVIEW ALL MEDS BY PRESCRIBER/CLIN PHARMACIST DOCUMENTED: ICD-10-PCS | Mod: CPTII,S$GLB,, | Performed by: PHYSICAL MEDICINE & REHABILITATION

## 2023-02-27 PROCEDURE — 99999 PR PBB SHADOW E&M-EST. PATIENT-LVL III: ICD-10-PCS | Mod: PBBFAC,,, | Performed by: PHYSICAL MEDICINE & REHABILITATION

## 2023-02-27 PROCEDURE — 3008F BODY MASS INDEX DOCD: CPT | Mod: CPTII,S$GLB,, | Performed by: PHYSICAL MEDICINE & REHABILITATION

## 2023-02-27 PROCEDURE — 0502F SUBSEQUENT PRENATAL CARE: CPT | Mod: CPTII,S$GLB,, | Performed by: OBSTETRICS & GYNECOLOGY

## 2023-02-27 PROCEDURE — 1160F RVW MEDS BY RX/DR IN RCRD: CPT | Mod: CPTII,S$GLB,, | Performed by: PHYSICAL MEDICINE & REHABILITATION

## 2023-02-27 PROCEDURE — 3075F SYST BP GE 130 - 139MM HG: CPT | Mod: CPTII,S$GLB,, | Performed by: PHYSICAL MEDICINE & REHABILITATION

## 2023-02-27 PROCEDURE — 99999 PR PBB SHADOW E&M-EST. PATIENT-LVL II: CPT | Mod: PBBFAC,,, | Performed by: OBSTETRICS & GYNECOLOGY

## 2023-02-27 PROCEDURE — 80053 COMPREHEN METABOLIC PANEL: CPT | Performed by: OBSTETRICS & GYNECOLOGY

## 2023-02-27 PROCEDURE — 99203 PR OFFICE/OUTPT VISIT, NEW, LEVL III, 30-44 MIN: ICD-10-PCS | Mod: S$GLB,,, | Performed by: PHYSICAL MEDICINE & REHABILITATION

## 2023-02-27 PROCEDURE — 3008F PR BODY MASS INDEX (BMI) DOCUMENTED: ICD-10-PCS | Mod: CPTII,S$GLB,, | Performed by: PHYSICAL MEDICINE & REHABILITATION

## 2023-02-27 PROCEDURE — 99999 PR PBB SHADOW E&M-EST. PATIENT-LVL III: CPT | Mod: PBBFAC,,, | Performed by: PHYSICAL MEDICINE & REHABILITATION

## 2023-02-27 PROCEDURE — 82570 ASSAY OF URINE CREATININE: CPT | Performed by: OBSTETRICS & GYNECOLOGY

## 2023-02-27 NOTE — TELEPHONE ENCOUNTER
Paula Cordova, I offered her 11 but [place] app[place] 1:00 [place].  So she said she will come now if that's ok with you.  Will be there in 30 minutes, coming from the [place].

## 2023-02-27 NOTE — TELEPHONE ENCOUNTER
----- Message from Rose Cordova MD sent at 2/27/2023  8:30 AM CST -----  Actually I didn't realize I have an 11 o'clock opening - can we please have her come in there if possible?  Thank you!!

## 2023-02-27 NOTE — PROGRESS NOTES
31w2d with elevated BPs on home cuff over the weekend.   Repeat manual /89  Pt has had a dull HA over the weekend that was not very responsive to tylenol. No vision changes. No RUQ pain.   Pre-E labs drawn now stat and pt sent home with supplies for 24 hour urine.

## 2023-03-01 ENCOUNTER — PATIENT MESSAGE (OUTPATIENT)
Dept: OBSTETRICS AND GYNECOLOGY | Facility: CLINIC | Age: 33
End: 2023-03-01
Payer: COMMERCIAL

## 2023-03-01 DIAGNOSIS — N61.0 MASTITIS: Primary | ICD-10-CM

## 2023-03-02 ENCOUNTER — LAB VISIT (OUTPATIENT)
Dept: LAB | Facility: HOSPITAL | Age: 33
End: 2023-03-02
Attending: OBSTETRICS & GYNECOLOGY
Payer: COMMERCIAL

## 2023-03-02 DIAGNOSIS — O16.3 ELEVATED BLOOD PRESSURE AFFECTING PREGNANCY IN THIRD TRIMESTER, ANTEPARTUM: ICD-10-CM

## 2023-03-02 PROCEDURE — 84156 ASSAY OF PROTEIN URINE: CPT | Performed by: OBSTETRICS & GYNECOLOGY

## 2023-03-02 RX ORDER — DICLOXACILLIN SODIUM 500 MG/1
500 CAPSULE ORAL EVERY 6 HOURS
Qty: 40 CAPSULE | Refills: 0 | Status: SHIPPED | OUTPATIENT
Start: 2023-03-02 | End: 2023-03-12

## 2023-03-03 LAB
PROT 24H UR-MRATE: NORMAL MG/SPEC (ref 0–100)
PROT UR-MCNC: <7 MG/DL (ref 0–15)
URINE COLLECTION DURATION: 24 HR
URINE VOLUME: 950 ML

## 2023-03-04 ENCOUNTER — PATIENT MESSAGE (OUTPATIENT)
Dept: OTHER | Facility: OTHER | Age: 33
End: 2023-03-04
Payer: COMMERCIAL

## 2023-03-06 ENCOUNTER — TELEPHONE (OUTPATIENT)
Dept: OBSTETRICS AND GYNECOLOGY | Facility: CLINIC | Age: 33
End: 2023-03-06
Payer: COMMERCIAL

## 2023-03-06 NOTE — TELEPHONE ENCOUNTER
----- Message from Rose Cordova MD sent at 3/3/2023  3:52 PM CST -----  Please call patient and let her know that her 24 hour urine was normal which is great news.   Thank you!

## 2023-03-15 ENCOUNTER — ROUTINE PRENATAL (OUTPATIENT)
Dept: OBSTETRICS AND GYNECOLOGY | Facility: CLINIC | Age: 33
End: 2023-03-15
Payer: COMMERCIAL

## 2023-03-15 ENCOUNTER — PROCEDURE VISIT (OUTPATIENT)
Dept: OBSTETRICS AND GYNECOLOGY | Facility: CLINIC | Age: 33
End: 2023-03-15
Payer: COMMERCIAL

## 2023-03-15 ENCOUNTER — PATIENT MESSAGE (OUTPATIENT)
Dept: OBSTETRICS AND GYNECOLOGY | Facility: CLINIC | Age: 33
End: 2023-03-15
Payer: COMMERCIAL

## 2023-03-15 VITALS — DIASTOLIC BLOOD PRESSURE: 78 MMHG | BODY MASS INDEX: 43.2 KG/M2 | SYSTOLIC BLOOD PRESSURE: 118 MMHG | WEIGHT: 259.56 LBS

## 2023-03-15 DIAGNOSIS — Z3A.33 33 WEEKS GESTATION OF PREGNANCY: Primary | ICD-10-CM

## 2023-03-15 DIAGNOSIS — Z3A.32 32 WEEKS GESTATION OF PREGNANCY: ICD-10-CM

## 2023-03-15 PROCEDURE — 76816 US OB/GYN EXTENDED PROCEDURE (VIEWPOINT): ICD-10-PCS | Mod: S$GLB,,, | Performed by: OBSTETRICS & GYNECOLOGY

## 2023-03-15 PROCEDURE — 0502F PR SUBSEQUENT PRENATAL CARE: ICD-10-PCS | Mod: CPTII,S$GLB,, | Performed by: NURSE PRACTITIONER

## 2023-03-15 PROCEDURE — 0502F SUBSEQUENT PRENATAL CARE: CPT | Mod: CPTII,S$GLB,, | Performed by: NURSE PRACTITIONER

## 2023-03-15 PROCEDURE — 99999 PR PBB SHADOW E&M-EST. PATIENT-LVL II: CPT | Mod: PBBFAC,,, | Performed by: NURSE PRACTITIONER

## 2023-03-15 PROCEDURE — 76816 OB US FOLLOW-UP PER FETUS: CPT | Mod: S$GLB,,, | Performed by: OBSTETRICS & GYNECOLOGY

## 2023-03-15 PROCEDURE — 99999 PR PBB SHADOW E&M-EST. PATIENT-LVL II: ICD-10-PCS | Mod: PBBFAC,,, | Performed by: NURSE PRACTITIONER

## 2023-03-15 NOTE — PROGRESS NOTES
Presents at 33w4d for MARIO ALBERTO. Growth US reviewed. Doing well, denies preE symptoms. FU in 2 weeks for MARIO ALBERTO.

## 2023-03-17 ENCOUNTER — TELEPHONE (OUTPATIENT)
Dept: OBSTETRICS AND GYNECOLOGY | Facility: CLINIC | Age: 33
End: 2023-03-17
Payer: COMMERCIAL

## 2023-03-17 DIAGNOSIS — Z34.90 PREGNANCY, UNSPECIFIED GESTATIONAL AGE: Primary | ICD-10-CM

## 2023-03-17 DIAGNOSIS — Z36.4 ULTRASOUND FOR ANTENATAL SCREENING FOR FETAL GROWTH RESTRICTION: Primary | ICD-10-CM

## 2023-03-17 NOTE — TELEPHONE ENCOUNTER
----- Message from Rose Cordova MD sent at 3/17/2023 12:41 PM CDT -----  Please schedule a growth U/S to go with visit with me on 4/11. Orders placed by Flori.   Thank you!

## 2023-03-17 NOTE — TELEPHONE ENCOUNTER
----- Message from Rose Codrova MD sent at 3/17/2023 12:41 PM CDT -----  Please schedule a growth U/S to go with visit with me on 4/11. Orders placed by Flori.   Thank you!

## 2023-03-17 NOTE — TELEPHONE ENCOUNTER
Spoke to pt. She was informed that an US was added with her appointment on 4/11 at 2:30p pt verbalized understanding

## 2023-03-25 ENCOUNTER — PATIENT MESSAGE (OUTPATIENT)
Dept: OTHER | Facility: OTHER | Age: 33
End: 2023-03-25
Payer: COMMERCIAL

## 2023-03-28 ENCOUNTER — ROUTINE PRENATAL (OUTPATIENT)
Dept: OBSTETRICS AND GYNECOLOGY | Facility: CLINIC | Age: 33
End: 2023-03-28
Payer: COMMERCIAL

## 2023-03-28 ENCOUNTER — TELEPHONE (OUTPATIENT)
Dept: OBSTETRICS AND GYNECOLOGY | Facility: CLINIC | Age: 33
End: 2023-03-28
Payer: COMMERCIAL

## 2023-03-28 ENCOUNTER — PATIENT MESSAGE (OUTPATIENT)
Dept: OBSTETRICS AND GYNECOLOGY | Facility: CLINIC | Age: 33
End: 2023-03-28

## 2023-03-28 ENCOUNTER — LAB VISIT (OUTPATIENT)
Dept: LAB | Facility: OTHER | Age: 33
End: 2023-03-28
Attending: OBSTETRICS & GYNECOLOGY
Payer: COMMERCIAL

## 2023-03-28 VITALS
SYSTOLIC BLOOD PRESSURE: 128 MMHG | DIASTOLIC BLOOD PRESSURE: 90 MMHG | WEIGHT: 262.44 LBS | BODY MASS INDEX: 43.68 KG/M2

## 2023-03-28 DIAGNOSIS — D25.9 UTERINE FIBROIDS AFFECTING PREGNANCY, ANTEPARTUM: Primary | ICD-10-CM

## 2023-03-28 DIAGNOSIS — O34.10 UTERINE FIBROIDS AFFECTING PREGNANCY, ANTEPARTUM: Primary | ICD-10-CM

## 2023-03-28 DIAGNOSIS — M54.9 BACK PAIN AFFECTING PREGNANCY, ANTEPARTUM: ICD-10-CM

## 2023-03-28 DIAGNOSIS — Z3A.35 35 WEEKS GESTATION OF PREGNANCY: ICD-10-CM

## 2023-03-28 DIAGNOSIS — O99.891 BACK PAIN AFFECTING PREGNANCY, ANTEPARTUM: ICD-10-CM

## 2023-03-28 DIAGNOSIS — Z3A.09 9 WEEKS GESTATION OF PREGNANCY: ICD-10-CM

## 2023-03-28 LAB
BASOPHILS # BLD AUTO: 0.03 K/UL (ref 0–0.2)
BASOPHILS NFR BLD: 0.4 % (ref 0–1.9)
DIFFERENTIAL METHOD: ABNORMAL
EOSINOPHIL # BLD AUTO: 0.1 K/UL (ref 0–0.5)
EOSINOPHIL NFR BLD: 1.1 % (ref 0–8)
ERYTHROCYTE [DISTWIDTH] IN BLOOD BY AUTOMATED COUNT: 13.1 % (ref 11.5–14.5)
HCT VFR BLD AUTO: 33.5 % (ref 37–48.5)
HGB BLD-MCNC: 11.2 G/DL (ref 12–16)
IMM GRANULOCYTES # BLD AUTO: 0.06 K/UL (ref 0–0.04)
IMM GRANULOCYTES NFR BLD AUTO: 0.7 % (ref 0–0.5)
LYMPHOCYTES # BLD AUTO: 1.6 K/UL (ref 1–4.8)
LYMPHOCYTES NFR BLD: 18.7 % (ref 18–48)
MCH RBC QN AUTO: 30.3 PG (ref 27–31)
MCHC RBC AUTO-ENTMCNC: 33.4 G/DL (ref 32–36)
MCV RBC AUTO: 91 FL (ref 82–98)
MONOCYTES # BLD AUTO: 0.8 K/UL (ref 0.3–1)
MONOCYTES NFR BLD: 9.3 % (ref 4–15)
NEUTROPHILS # BLD AUTO: 5.8 K/UL (ref 1.8–7.7)
NEUTROPHILS NFR BLD: 69.8 % (ref 38–73)
NRBC BLD-RTO: 0 /100 WBC
PLATELET # BLD AUTO: 283 K/UL (ref 150–450)
PMV BLD AUTO: 9.4 FL (ref 9.2–12.9)
RBC # BLD AUTO: 3.7 M/UL (ref 4–5.4)
RPR SER QL: NORMAL
TSH SERPL DL<=0.005 MIU/L-ACNC: 1.19 UIU/ML (ref 0.4–4)
WBC # BLD AUTO: 8.31 K/UL (ref 3.9–12.7)

## 2023-03-28 PROCEDURE — 36415 COLL VENOUS BLD VENIPUNCTURE: CPT | Performed by: OBSTETRICS & GYNECOLOGY

## 2023-03-28 PROCEDURE — 0502F PR SUBSEQUENT PRENATAL CARE: ICD-10-PCS | Mod: CPTII,S$GLB,, | Performed by: OBSTETRICS & GYNECOLOGY

## 2023-03-28 PROCEDURE — 99999 PR PBB SHADOW E&M-EST. PATIENT-LVL II: ICD-10-PCS | Mod: PBBFAC,,, | Performed by: OBSTETRICS & GYNECOLOGY

## 2023-03-28 PROCEDURE — 87081 CULTURE SCREEN ONLY: CPT | Performed by: OBSTETRICS & GYNECOLOGY

## 2023-03-28 PROCEDURE — 87389 HIV-1 AG W/HIV-1&-2 AB AG IA: CPT | Performed by: OBSTETRICS & GYNECOLOGY

## 2023-03-28 PROCEDURE — 99999 PR PBB SHADOW E&M-EST. PATIENT-LVL II: CPT | Mod: PBBFAC,,, | Performed by: OBSTETRICS & GYNECOLOGY

## 2023-03-28 PROCEDURE — 87340 HEPATITIS B SURFACE AG IA: CPT | Performed by: OBSTETRICS & GYNECOLOGY

## 2023-03-28 PROCEDURE — 0502F SUBSEQUENT PRENATAL CARE: CPT | Mod: CPTII,S$GLB,, | Performed by: OBSTETRICS & GYNECOLOGY

## 2023-03-28 PROCEDURE — 86803 HEPATITIS C AB TEST: CPT | Performed by: OBSTETRICS & GYNECOLOGY

## 2023-03-28 PROCEDURE — 85025 COMPLETE CBC W/AUTO DIFF WBC: CPT | Performed by: OBSTETRICS & GYNECOLOGY

## 2023-03-28 PROCEDURE — 84443 ASSAY THYROID STIM HORMONE: CPT | Performed by: OBSTETRICS & GYNECOLOGY

## 2023-03-28 PROCEDURE — 86762 RUBELLA ANTIBODY: CPT | Performed by: OBSTETRICS & GYNECOLOGY

## 2023-03-28 PROCEDURE — 86592 SYPHILIS TEST NON-TREP QUAL: CPT | Performed by: OBSTETRICS & GYNECOLOGY

## 2023-03-28 NOTE — TELEPHONE ENCOUNTER
----- Message from Rose Cordova MD sent at 3/28/2023  3:46 PM CDT -----  Paula Owens,   Can we please request an IOL for Carie on 4/24 at 5 pm?   G1 at 39w2d. Uterine fibroids.     Thank you!

## 2023-03-28 NOTE — PROGRESS NOTES
35w3d without major complaints.   R/B/A of vaginal, operative, and  delivery as well as transfusion of blood products discussed today. All questions answered and patient voices understanding. Consents signed.   GBS, HIV, RPR, CBC collected.   Labor precautions discussed.   IOL requested for around 39 wga.  RTC in 2 weeks for routine PNC.

## 2023-03-29 ENCOUNTER — PATIENT MESSAGE (OUTPATIENT)
Dept: OBSTETRICS AND GYNECOLOGY | Facility: CLINIC | Age: 33
End: 2023-03-29
Payer: COMMERCIAL

## 2023-03-29 LAB
HBV SURFACE AG SERPL QL IA: NORMAL
HCV AB SERPL QL IA: NORMAL
HIV 1+2 AB+HIV1 P24 AG SERPL QL IA: NORMAL
RUBV IGG SER-ACNC: 37.2 IU/ML
RUBV IGG SER-IMP: REACTIVE

## 2023-03-31 LAB — BACTERIA SPEC AEROBE CULT: NORMAL

## 2023-04-11 ENCOUNTER — PROCEDURE VISIT (OUTPATIENT)
Dept: OBSTETRICS AND GYNECOLOGY | Facility: CLINIC | Age: 33
End: 2023-04-11
Payer: COMMERCIAL

## 2023-04-11 ENCOUNTER — ROUTINE PRENATAL (OUTPATIENT)
Dept: OBSTETRICS AND GYNECOLOGY | Facility: CLINIC | Age: 33
End: 2023-04-11
Payer: COMMERCIAL

## 2023-04-11 ENCOUNTER — TELEPHONE (OUTPATIENT)
Dept: OBSTETRICS AND GYNECOLOGY | Facility: CLINIC | Age: 33
End: 2023-04-11

## 2023-04-11 VITALS — SYSTOLIC BLOOD PRESSURE: 128 MMHG | DIASTOLIC BLOOD PRESSURE: 94 MMHG

## 2023-04-11 DIAGNOSIS — O13.3 GESTATIONAL HYPERTENSION, THIRD TRIMESTER: ICD-10-CM

## 2023-04-11 DIAGNOSIS — Z34.90 PREGNANCY, UNSPECIFIED GESTATIONAL AGE: ICD-10-CM

## 2023-04-11 DIAGNOSIS — O34.10 UTERINE FIBROIDS AFFECTING PREGNANCY, ANTEPARTUM: Primary | ICD-10-CM

## 2023-04-11 DIAGNOSIS — D25.9 UTERINE FIBROIDS AFFECTING PREGNANCY, ANTEPARTUM: Primary | ICD-10-CM

## 2023-04-11 DIAGNOSIS — Z3A.37 37 WEEKS GESTATION OF PREGNANCY: ICD-10-CM

## 2023-04-11 PROCEDURE — 99999 PR PBB SHADOW E&M-EST. PATIENT-LVL II: CPT | Mod: PBBFAC,,, | Performed by: OBSTETRICS & GYNECOLOGY

## 2023-04-11 PROCEDURE — 99999 PR PBB SHADOW E&M-EST. PATIENT-LVL II: ICD-10-PCS | Mod: PBBFAC,,, | Performed by: OBSTETRICS & GYNECOLOGY

## 2023-04-11 PROCEDURE — 76816 US OB/GYN EXTENDED PROCEDURE (VIEWPOINT): ICD-10-PCS | Mod: S$GLB,,, | Performed by: OBSTETRICS & GYNECOLOGY

## 2023-04-11 PROCEDURE — 76819 FETAL BIOPHYS PROFIL W/O NST: CPT | Mod: S$GLB,,, | Performed by: OBSTETRICS & GYNECOLOGY

## 2023-04-11 PROCEDURE — 76816 OB US FOLLOW-UP PER FETUS: CPT | Mod: S$GLB,,, | Performed by: OBSTETRICS & GYNECOLOGY

## 2023-04-11 PROCEDURE — 76819 US OB/GYN EXTENDED PROCEDURE (VIEWPOINT): ICD-10-PCS | Mod: S$GLB,,, | Performed by: OBSTETRICS & GYNECOLOGY

## 2023-04-11 PROCEDURE — 0502F PR SUBSEQUENT PRENATAL CARE: ICD-10-PCS | Mod: CPTII,S$GLB,, | Performed by: OBSTETRICS & GYNECOLOGY

## 2023-04-11 PROCEDURE — 0502F SUBSEQUENT PRENATAL CARE: CPT | Mod: CPTII,S$GLB,, | Performed by: OBSTETRICS & GYNECOLOGY

## 2023-04-11 NOTE — TELEPHONE ENCOUNTER
----- Message from Rose Cordova MD sent at 4/11/2023  3:00 PM CDT -----  Paula Owens,   Can we please move Carie's Induction up to Tuesday 4/18 at midnight (looks like there is an open spot).   She will be 38w3d. Indication is gestational hypertension. She's a G1 with obesity.   Thank you!!

## 2023-04-17 ENCOUNTER — PATIENT MESSAGE (OUTPATIENT)
Dept: OBSTETRICS AND GYNECOLOGY | Facility: OTHER | Age: 33
End: 2023-04-17
Payer: COMMERCIAL

## 2023-04-17 NOTE — H&P (VIEW-ONLY)
HISTORY AND PHYSICAL                                                OBSTETRICS        Chief Complaint: Induction of Labor     Subjective:      Carie Estrada is a 32 y.o.  female with IUP at 38w3d gestation who presents to L&D for induction of labor. Pertinent medical history for this pregnancy includes gestational hypertension, uterine fibroids, iron deficiency anemia, and obesity.  Patient reports normal fetal movement.  She denies contractions, vaginal bleeding, and leakage of fluid.  Care this pregnancy has been with Dr. Cordova.     At time of last ultrasound, left lateral fibroid was 5x2x5 cm and right lateral fibroid measures 9x7x9 cm.    PMHx:   Past Medical History:   Diagnosis Date    Abnormal Pap smear 1/3/2013    Allergy     Anxiety     Panic attacks        PSHx: History reviewed. No pertinent surgical history.    All: Review of patient's allergies indicates:  No Known Allergies    Meds: (Not in a hospital admission)      SH:   Social History     Socioeconomic History    Marital status:    Tobacco Use    Smoking status: Never    Smokeless tobacco: Never   Substance and Sexual Activity    Alcohol use: Yes     Comment: socially    Drug use: No    Sexual activity: Yes     Partners: Male     Birth control/protection: OCP       FH:   Family History   Problem Relation Age of Onset    Hypertension Father     Diabetes Father     Heart attacks under age 50 Father     Hypertension Maternal Grandmother     Hypertension Paternal Grandmother     Hypertension Paternal Grandfather     Diabetes Paternal Grandfather     Heart attack Paternal Grandfather     Breast cancer Neg Hx     Ovarian cancer Neg Hx     Melanoma Neg Hx        OBHx:   OB History    Para Term  AB Living   1 0 0 0 0 0   SAB IAB Ectopic Multiple Live Births   0 0 0 0 0      # Outcome Date GA Lbr Jasbir/2nd Weight Sex Delivery Anes PTL Lv   1 Current                Objective:      .FLOWAMB[14   .FLOWAMB[5   BMI  Readings from Last 1 Encounters:   23 43.68 kg/m²         General:   alert and cooperative   HEENT:  normocephalic, atraumatic   Lungs:   Normal work of breathing   Heart:   Normal cap refill   Abdomen:  gravid, non-tender   Extremities non-tender, no edema   Derm: no rashes or lesions   Psych: appropriate mood and affect   Pelvis:  adequate       Cervix:     Dilation: 0 cm    Effacement: 50%    Station:  -3    Consistency: moderate    Position posterior     Vertex by ultrasound    Lab Review  Prenatal Labs:  Lab Results   Component Value Date    GROUPTRH O POS 10/06/2022    HGB 11.2 (L) 2023    HCT 33.5 (L) 2023     2023    RUBELLAIMMUN Reactive 2023    HEPBSAG Non-reactive 2023    SFB13CNGM Non-reactive 2023    RPR Non-reactive 2023    LABCHLA Not Detected 2022    LABNGO Not Detected 2022    LABURIN ESCHERICHIA COLI  >100,000 cfu/ml   (A) 2023    OBGLUCOSESCR 68 (L) 2023    STREPBCULT No Group B Streptococcus isolated 2023    UPROTT Unable to calculate 2023       Assessment:     32 y.o.  at 38w3d gestation here induction of labor  2. gHTN  3. Iron deficiency anemia  4. Obesity  5. Uterine fibroids    Plan:     1. Risks, benefits, alternatives and possible complications of delivery, possible , possible blood transfusion, possible operative vaginal delivery have been discussed in detail with the patient. All questions have been answered, and Ms. Estrada has voiced understanding and agrees to the treatment plan.  2. Consents signed and in chart  3. Admit to Labor and Delivery unit

## 2023-04-17 NOTE — H&P
HISTORY AND PHYSICAL                                                OBSTETRICS        Chief Complaint: Induction of Labor     Subjective:      Carie Estrada is a 32 y.o.  female with IUP at 38w3d gestation who presents to L&D for induction of labor. Pertinent medical history for this pregnancy includes gestational hypertension, uterine fibroids, iron deficiency anemia, and obesity.  Patient reports normal fetal movement.  She denies contractions, vaginal bleeding, and leakage of fluid.  Care this pregnancy has been with Dr. Cordova.     At time of last ultrasound, left lateral fibroid was 5x2x5 cm and right lateral fibroid measures 9x7x9 cm.    PMHx:   Past Medical History:   Diagnosis Date    Abnormal Pap smear 1/3/2013    Allergy     Anxiety     Panic attacks        PSHx: History reviewed. No pertinent surgical history.    All: Review of patient's allergies indicates:  No Known Allergies    Meds: (Not in a hospital admission)      SH:   Social History     Socioeconomic History    Marital status:    Tobacco Use    Smoking status: Never    Smokeless tobacco: Never   Substance and Sexual Activity    Alcohol use: Yes     Comment: socially    Drug use: No    Sexual activity: Yes     Partners: Male     Birth control/protection: OCP       FH:   Family History   Problem Relation Age of Onset    Hypertension Father     Diabetes Father     Heart attacks under age 50 Father     Hypertension Maternal Grandmother     Hypertension Paternal Grandmother     Hypertension Paternal Grandfather     Diabetes Paternal Grandfather     Heart attack Paternal Grandfather     Breast cancer Neg Hx     Ovarian cancer Neg Hx     Melanoma Neg Hx        OBHx:   OB History    Para Term  AB Living   1 0 0 0 0 0   SAB IAB Ectopic Multiple Live Births   0 0 0 0 0      # Outcome Date GA Lbr Jasbir/2nd Weight Sex Delivery Anes PTL Lv   1 Current                Objective:      .FLOWAMB[14   .FLOWAMB[5   BMI  Readings from Last 1 Encounters:   23 43.68 kg/m²         General:   alert and cooperative   HEENT:  normocephalic, atraumatic   Lungs:   Normal work of breathing   Heart:   Normal cap refill   Abdomen:  gravid, non-tender   Extremities non-tender, no edema   Derm: no rashes or lesions   Psych: appropriate mood and affect   Pelvis:  adequate       Cervix:     Dilation: 0 cm    Effacement: 50%    Station:  -3    Consistency: moderate    Position posterior     Vertex by ultrasound    Lab Review  Prenatal Labs:  Lab Results   Component Value Date    GROUPTRH O POS 10/06/2022    HGB 11.2 (L) 2023    HCT 33.5 (L) 2023     2023    RUBELLAIMMUN Reactive 2023    HEPBSAG Non-reactive 2023    [id number] Non-reactive 2023    RPR Non-reactive 2023    LABCHLA Not Detected 2022    LABNGO Not Detected 2022    LABURIN ESCHERICHIA COLI  >100,000 cfu/ml   (A) 2023    OBGLUCOSESCR 68 (L) 2023    STREPBCULT No Group B Streptococcus isolated 2023    UPROTT Unable to calculate 2023       Assessment:     32 y.o.  at 38w3d gestation here induction of labor  2. gHTN  3. Iron deficiency anemia  4. Obesity  5. Uterine fibroids    Plan:     1. Risks, benefits, alternatives and possible complications of delivery, possible , possible blood transfusion, possible operative vaginal delivery have been discussed in detail with the patient. All questions have been answered, and Ms. Estrada has voiced understanding and agrees to the treatment plan.  2. Consents signed and in chart  3. Admit to Labor and Delivery unit

## 2023-04-18 ENCOUNTER — HOSPITAL ENCOUNTER (INPATIENT)
Facility: OTHER | Age: 33
LOS: 3 days | Discharge: HOME | End: 2023-04-21
Attending: STUDENT IN AN ORGANIZED HEALTH CARE EDUCATION/TRAINING PROGRAM | Admitting: OBSTETRICS & GYNECOLOGY
Payer: COMMERCIAL

## 2023-04-18 ENCOUNTER — ANESTHESIA EVENT (OUTPATIENT)
Dept: OBSTETRICS AND GYNECOLOGY | Facility: OTHER | Age: 33
End: 2023-04-18
Payer: COMMERCIAL

## 2023-04-18 ENCOUNTER — ANESTHESIA (OUTPATIENT)
Dept: OBSTETRICS AND GYNECOLOGY | Facility: OTHER | Age: 33
End: 2023-04-18
Payer: COMMERCIAL

## 2023-04-18 DIAGNOSIS — O34.10 UTERINE FIBROIDS AFFECTING PREGNANCY, ANTEPARTUM: ICD-10-CM

## 2023-04-18 DIAGNOSIS — O13.3 GESTATIONAL HYPERTENSION, THIRD TRIMESTER: ICD-10-CM

## 2023-04-18 DIAGNOSIS — D25.9 UTERINE FIBROIDS AFFECTING PREGNANCY, ANTEPARTUM: ICD-10-CM

## 2023-04-18 DIAGNOSIS — Z34.90 ENCOUNTER FOR INDUCTION OF LABOR: Primary | ICD-10-CM

## 2023-04-18 LAB
ABO + RH BLD: NORMAL
ALBUMIN SERPL BCP-MCNC: 2.9 G/DL (ref 3.5–5.2)
ALP SERPL-CCNC: 156 U/L (ref 55–135)
ALT SERPL W/O P-5'-P-CCNC: 20 U/L (ref 10–44)
ANION GAP SERPL CALC-SCNC: 12 MMOL/L (ref 8–16)
AST SERPL-CCNC: 21 U/L (ref 10–40)
BASOPHILS # BLD AUTO: 0.03 K/UL (ref 0–0.2)
BASOPHILS NFR BLD: 0.4 % (ref 0–1.9)
BILIRUB SERPL-MCNC: 0.3 MG/DL (ref 0.1–1)
BLD GP AB SCN CELLS X3 SERPL QL: NORMAL
BUN SERPL-MCNC: 9 MG/DL (ref 6–20)
CALCIUM SERPL-MCNC: 9.2 MG/DL (ref 8.7–10.5)
CHLORIDE SERPL-SCNC: 107 MMOL/L (ref 95–110)
CO2 SERPL-SCNC: 18 MMOL/L (ref 23–29)
CREAT SERPL-MCNC: 0.7 MG/DL (ref 0.5–1.4)
CREAT UR-MCNC: 229.5 MG/DL (ref 15–325)
DIFFERENTIAL METHOD: ABNORMAL
EOSINOPHIL # BLD AUTO: 0.1 K/UL (ref 0–0.5)
EOSINOPHIL NFR BLD: 1.1 % (ref 0–8)
ERYTHROCYTE [DISTWIDTH] IN BLOOD BY AUTOMATED COUNT: 13.3 % (ref 11.5–14.5)
EST. GFR  (NO RACE VARIABLE): >60 ML/MIN/1.73 M^2
ESTIMATED AVG GLUCOSE: 103 MG/DL (ref 68–131)
GLUCOSE SERPL-MCNC: 93 MG/DL (ref 70–110)
HBA1C MFR BLD: 5.2 % (ref 4–5.6)
HCT VFR BLD AUTO: 31.3 % (ref 37–48.5)
HGB BLD-MCNC: 10.7 G/DL (ref 12–16)
IMM GRANULOCYTES # BLD AUTO: 0.06 K/UL (ref 0–0.04)
IMM GRANULOCYTES NFR BLD AUTO: 0.7 % (ref 0–0.5)
LYMPHOCYTES # BLD AUTO: 1.6 K/UL (ref 1–4.8)
LYMPHOCYTES NFR BLD: 19.1 % (ref 18–48)
MCH RBC QN AUTO: 31.1 PG (ref 27–31)
MCHC RBC AUTO-ENTMCNC: 34.2 G/DL (ref 32–36)
MCV RBC AUTO: 91 FL (ref 82–98)
MONOCYTES # BLD AUTO: 0.7 K/UL (ref 0.3–1)
MONOCYTES NFR BLD: 8.9 % (ref 4–15)
NEUTROPHILS # BLD AUTO: 5.8 K/UL (ref 1.8–7.7)
NEUTROPHILS NFR BLD: 69.8 % (ref 38–73)
NRBC BLD-RTO: 0 /100 WBC
PLATELET # BLD AUTO: 247 K/UL (ref 150–450)
PMV BLD AUTO: 9.9 FL (ref 9.2–12.9)
POTASSIUM SERPL-SCNC: 3.6 MMOL/L (ref 3.5–5.1)
PROT SERPL-MCNC: 6.4 G/DL (ref 6–8.4)
PROT UR-MCNC: 15 MG/DL (ref 0–15)
PROT/CREAT UR: 0.07 MG/G{CREAT} (ref 0–0.2)
RBC # BLD AUTO: 3.44 M/UL (ref 4–5.4)
SODIUM SERPL-SCNC: 137 MMOL/L (ref 136–145)
SPECIMEN OUTDATE: NORMAL
WBC # BLD AUTO: 8.27 K/UL (ref 3.9–12.7)

## 2023-04-18 PROCEDURE — 85025 COMPLETE CBC W/AUTO DIFF WBC: CPT

## 2023-04-18 PROCEDURE — 27200710 HC EPIDURAL INFUSION PUMP SET: Performed by: ANESTHESIOLOGY

## 2023-04-18 PROCEDURE — 62326 NJX INTERLAMINAR LMBR/SAC: CPT | Performed by: STUDENT IN AN ORGANIZED HEALTH CARE EDUCATION/TRAINING PROGRAM

## 2023-04-18 PROCEDURE — 25000003 PHARM REV CODE 250: Performed by: STUDENT IN AN ORGANIZED HEALTH CARE EDUCATION/TRAINING PROGRAM

## 2023-04-18 PROCEDURE — 36415 COLL VENOUS BLD VENIPUNCTURE: CPT

## 2023-04-18 PROCEDURE — 11000001 HC ACUTE MED/SURG PRIVATE ROOM

## 2023-04-18 PROCEDURE — 63600175 PHARM REV CODE 636 W HCPCS

## 2023-04-18 PROCEDURE — 80053 COMPREHEN METABOLIC PANEL: CPT

## 2023-04-18 PROCEDURE — 59200 INSERT CERVICAL DILATOR: CPT

## 2023-04-18 PROCEDURE — 59400 OBSTETRICAL CARE: CPT | Mod: QY,,, | Performed by: ANESTHESIOLOGY

## 2023-04-18 PROCEDURE — C1751 CATH, INF, PER/CENT/MIDLINE: HCPCS | Performed by: ANESTHESIOLOGY

## 2023-04-18 PROCEDURE — 63600175 PHARM REV CODE 636 W HCPCS: Performed by: STUDENT IN AN ORGANIZED HEALTH CARE EDUCATION/TRAINING PROGRAM

## 2023-04-18 PROCEDURE — 82570 ASSAY OF URINE CREATININE: CPT

## 2023-04-18 PROCEDURE — 86920 COMPATIBILITY TEST SPIN: CPT | Performed by: GENERAL PRACTICE

## 2023-04-18 PROCEDURE — 59400 PRA FULL ROUT OBSTE CARE,VAGINAL DELIV: ICD-10-PCS | Mod: QY,,, | Performed by: ANESTHESIOLOGY

## 2023-04-18 PROCEDURE — 83036 HEMOGLOBIN GLYCOSYLATED A1C: CPT

## 2023-04-18 PROCEDURE — 51702 INSERT TEMP BLADDER CATH: CPT

## 2023-04-18 PROCEDURE — 86900 BLOOD TYPING SEROLOGIC ABO: CPT

## 2023-04-18 RX ORDER — FENTANYL CITRATE 50 UG/ML
INJECTION, SOLUTION INTRAMUSCULAR; INTRAVENOUS
Status: COMPLETED
Start: 2023-04-18 | End: 2023-04-18

## 2023-04-18 RX ORDER — MISOPROSTOL 200 UG/1
800 TABLET ORAL ONCE AS NEEDED
Status: DISCONTINUED | OUTPATIENT
Start: 2023-04-18 | End: 2023-04-21 | Stop reason: HOSPADM

## 2023-04-18 RX ORDER — FENTANYL/BUPIVACAINE/NS/PF 2MCG/ML-.1
PLASTIC BAG, INJECTION (ML) INJECTION CONTINUOUS PRN
Status: DISCONTINUED | OUTPATIENT
Start: 2023-04-18 | End: 2023-04-19

## 2023-04-18 RX ORDER — OXYTOCIN/RINGER'S LACTATE 30/500 ML
334 PLASTIC BAG, INJECTION (ML) INTRAVENOUS ONCE AS NEEDED
Status: DISCONTINUED | OUTPATIENT
Start: 2023-04-18 | End: 2023-04-21 | Stop reason: HOSPADM

## 2023-04-18 RX ORDER — SODIUM CHLORIDE, SODIUM LACTATE, POTASSIUM CHLORIDE, CALCIUM CHLORIDE 600; 310; 30; 20 MG/100ML; MG/100ML; MG/100ML; MG/100ML
INJECTION, SOLUTION INTRAVENOUS CONTINUOUS
Status: DISCONTINUED | OUTPATIENT
Start: 2023-04-18 | End: 2023-04-21 | Stop reason: HOSPADM

## 2023-04-18 RX ORDER — OXYTOCIN/RINGER'S LACTATE 30/500 ML
334 PLASTIC BAG, INJECTION (ML) INTRAVENOUS ONCE
Status: DISCONTINUED | OUTPATIENT
Start: 2023-04-18 | End: 2023-04-21 | Stop reason: HOSPADM

## 2023-04-18 RX ORDER — FENTANYL/BUPIVACAINE/NS/PF 2MCG/ML-.1
PLASTIC BAG, INJECTION (ML) INJECTION CONTINUOUS
Status: CANCELLED | OUTPATIENT
Start: 2023-04-18

## 2023-04-18 RX ORDER — OXYTOCIN/RINGER'S LACTATE 30/500 ML
95 PLASTIC BAG, INJECTION (ML) INTRAVENOUS ONCE AS NEEDED
Status: DISCONTINUED | OUTPATIENT
Start: 2023-04-18 | End: 2023-04-21 | Stop reason: HOSPADM

## 2023-04-18 RX ORDER — PROCHLORPERAZINE EDISYLATE 5 MG/ML
5 INJECTION INTRAMUSCULAR; INTRAVENOUS EVERY 6 HOURS PRN
Status: DISCONTINUED | OUTPATIENT
Start: 2023-04-18 | End: 2023-04-21 | Stop reason: HOSPADM

## 2023-04-18 RX ORDER — MISOPROSTOL 200 UG/1
800 TABLET ORAL ONCE AS NEEDED
Status: COMPLETED | OUTPATIENT
Start: 2023-04-18 | End: 2023-04-19

## 2023-04-18 RX ORDER — FAMOTIDINE 10 MG/ML
20 INJECTION INTRAVENOUS ONCE
Status: CANCELLED | OUTPATIENT
Start: 2023-04-18 | End: 2023-04-18

## 2023-04-18 RX ORDER — TRANEXAMIC ACID 10 MG/ML
1000 INJECTION, SOLUTION INTRAVENOUS ONCE AS NEEDED
Status: DISCONTINUED | OUTPATIENT
Start: 2023-04-18 | End: 2023-04-21 | Stop reason: HOSPADM

## 2023-04-18 RX ORDER — HYDROCODONE BITARTRATE AND ACETAMINOPHEN 500; 5 MG/1; MG/1
TABLET ORAL
Status: DISCONTINUED | OUTPATIENT
Start: 2023-04-18 | End: 2023-04-21 | Stop reason: HOSPADM

## 2023-04-18 RX ORDER — SIMETHICONE 80 MG
1 TABLET,CHEWABLE ORAL 4 TIMES DAILY PRN
Status: DISCONTINUED | OUTPATIENT
Start: 2023-04-18 | End: 2023-04-19 | Stop reason: SDUPTHER

## 2023-04-18 RX ORDER — CITALOPRAM 10 MG/1
10 TABLET ORAL DAILY
Status: DISCONTINUED | OUTPATIENT
Start: 2023-04-18 | End: 2023-04-21 | Stop reason: HOSPADM

## 2023-04-18 RX ORDER — SODIUM CHLORIDE 9 MG/ML
INJECTION, SOLUTION INTRAVENOUS
Status: DISCONTINUED | OUTPATIENT
Start: 2023-04-18 | End: 2023-04-21 | Stop reason: HOSPADM

## 2023-04-18 RX ORDER — SODIUM CITRATE AND CITRIC ACID MONOHYDRATE 334; 500 MG/5ML; MG/5ML
30 SOLUTION ORAL ONCE
Status: CANCELLED | OUTPATIENT
Start: 2023-04-18 | End: 2023-04-18

## 2023-04-18 RX ORDER — METHYLERGONOVINE MALEATE 0.2 MG/ML
200 INJECTION INTRAVENOUS
Status: DISCONTINUED | OUTPATIENT
Start: 2023-04-18 | End: 2023-04-21 | Stop reason: HOSPADM

## 2023-04-18 RX ORDER — FENTANYL CITRATE 50 UG/ML
INJECTION, SOLUTION INTRAMUSCULAR; INTRAVENOUS
Status: DISCONTINUED | OUTPATIENT
Start: 2023-04-18 | End: 2023-04-19

## 2023-04-18 RX ORDER — FENTANYL/BUPIVACAINE/NS/PF 2MCG/ML-.1
PLASTIC BAG, INJECTION (ML) INJECTION
Status: COMPLETED
Start: 2023-04-18 | End: 2023-04-18

## 2023-04-18 RX ORDER — CARBOPROST TROMETHAMINE 250 UG/ML
250 INJECTION, SOLUTION INTRAMUSCULAR
Status: DISCONTINUED | OUTPATIENT
Start: 2023-04-18 | End: 2023-04-21 | Stop reason: HOSPADM

## 2023-04-18 RX ORDER — BUPIVACAINE HYDROCHLORIDE 2.5 MG/ML
INJECTION, SOLUTION EPIDURAL; INFILTRATION; INTRACAUDAL
Status: DISPENSED
Start: 2023-04-18 | End: 2023-04-19

## 2023-04-18 RX ORDER — OXYTOCIN 10 [USP'U]/ML
10 INJECTION, SOLUTION INTRAMUSCULAR; INTRAVENOUS ONCE AS NEEDED
Status: DISCONTINUED | OUTPATIENT
Start: 2023-04-18 | End: 2023-04-21 | Stop reason: HOSPADM

## 2023-04-18 RX ORDER — OXYTOCIN/RINGER'S LACTATE 30/500 ML
95 PLASTIC BAG, INJECTION (ML) INTRAVENOUS ONCE
Status: DISCONTINUED | OUTPATIENT
Start: 2023-04-18 | End: 2023-04-21 | Stop reason: HOSPADM

## 2023-04-18 RX ORDER — CALCIUM CARBONATE 200(500)MG
500 TABLET,CHEWABLE ORAL 3 TIMES DAILY PRN
Status: DISCONTINUED | OUTPATIENT
Start: 2023-04-18 | End: 2023-04-21 | Stop reason: HOSPADM

## 2023-04-18 RX ORDER — LIDOCAINE HYDROCHLORIDE 10 MG/ML
10 INJECTION INFILTRATION; PERINEURAL ONCE AS NEEDED
Status: DISCONTINUED | OUTPATIENT
Start: 2023-04-18 | End: 2023-04-21 | Stop reason: HOSPADM

## 2023-04-18 RX ORDER — OXYTOCIN/RINGER'S LACTATE 30/500 ML
0-30 PLASTIC BAG, INJECTION (ML) INTRAVENOUS CONTINUOUS
Status: DISCONTINUED | OUTPATIENT
Start: 2023-04-18 | End: 2023-04-21 | Stop reason: HOSPADM

## 2023-04-18 RX ORDER — ONDANSETRON 8 MG/1
8 TABLET, ORALLY DISINTEGRATING ORAL EVERY 8 HOURS PRN
Status: DISCONTINUED | OUTPATIENT
Start: 2023-04-18 | End: 2023-04-21 | Stop reason: HOSPADM

## 2023-04-18 RX ADMIN — Medication 8 ML/HR: at 08:04

## 2023-04-18 RX ADMIN — MISOPROSTOL 50 MCG: 100 TABLET ORAL at 05:04

## 2023-04-18 RX ADMIN — MISOPROSTOL 50 MCG: 100 TABLET ORAL at 01:04

## 2023-04-18 RX ADMIN — FENTANYL CITRATE 100 MCG: 0.05 INJECTION, SOLUTION INTRAMUSCULAR; INTRAVENOUS at 08:04

## 2023-04-18 RX ADMIN — SODIUM CHLORIDE, POTASSIUM CHLORIDE, SODIUM LACTATE AND CALCIUM CHLORIDE: 600; 310; 30; 20 INJECTION, SOLUTION INTRAVENOUS at 03:04

## 2023-04-18 RX ADMIN — Medication 4 MILLI-UNITS/MIN: at 10:04

## 2023-04-18 RX ADMIN — SODIUM CHLORIDE, POTASSIUM CHLORIDE, SODIUM LACTATE AND CALCIUM CHLORIDE: 600; 310; 30; 20 INJECTION, SOLUTION INTRAVENOUS at 12:04

## 2023-04-18 NOTE — ED NOTES
32 y.o.  38w3d by 9 wk US      Dx: IOL, gHTN, fibroids (9 x 9 x 7 on right, 5 x 2 x 5 on left)   PPHRisk: low  SVE: 0/50/-3 @ cl   Mem: I  US: vtx  Steve:   Meds: cytotec? Yasir   BC: BS  Labs:O POS/RImm/HIVneg/RPRneg/GBBSneg      [id number]  [ ] OB - BS  [ ] Labs  [ ] Girl

## 2023-04-18 NOTE — ANESTHESIA PREPROCEDURE EVALUATION
[facility]  Anesthesia Pre-Operative Evaluation         Patient Name: Carie Estrada  YOB: 1990  MRN: [id number]    2023      Carie Estrada is a 32 y.o. female  @ 38w3d who presents for scheduled labor. Patient denies bleeding disorders, spine surgery, and asthma    OB History    Para Term  AB Living   1             SAB IAB Ectopic Multiple Live Births                  # Outcome Date GA Lbr Jasbir/2nd Weight Sex Delivery Anes PTL Lv   1 Current                Review of patient's allergies indicates:  No Known Allergies    Wt Readings from Last 1 Encounters:   23 1452 119 kg (262 lb 7.3 oz)       BP Readings from Last 3 Encounters:   23 (!) 128/94   23 (!) 128/90   03/15/23 118/78       Patient Active Problem List   Diagnosis    Adjustment disorder with mixed anxiety and depressed mood    Anxiety    Uterine fibroids affecting pregnancy, unspecified trimester    Back pain in pregnancy    Gestational hypertension, third trimester    Encounter for induction of labor       No past surgical history on file.    Social History     Socioeconomic History    Marital status:    Tobacco Use    Smoking status: Never    Smokeless tobacco: Never   Substance and Sexual Activity    Alcohol use: Yes     Comment: socially    Drug use: No    Sexual activity: Yes     Partners: Male     Birth control/protection: OCP         Chemistry        Component Value Date/Time     (L) 2023 1009    K 4.0 2023 1009     2023 1009    CO2 21 (L) 2023 1009    BUN 5 (L) 2023 1009    CREATININE 0.6 2023 1009    GLU 72 2023 1009        Component Value Date/Time    CALCIUM 9.4 2023 1009    ALKPHOS 116 2023 1009    AST 16 2023 1009    ALT 14 2023 1009    BILITOT 0.3  02/27/2023 1009    ESTGFRAFRICA >60.0 04/01/2019 1610    EGFRNONAA >60.0 04/01/2019 1610            Lab Results   Component Value Date    WBC 8.31 03/28/2023    HGB 11.2 (L) 03/28/2023    HCT 33.5 (L) 03/28/2023    MCV 91 03/28/2023     03/28/2023       No results for input(s): PT, INR, PROTIME, APTT in the last 72 hours.        Pre-op Assessment    I have reviewed the Patient Summary Reports.     I have reviewed the Nursing Notes.    I have reviewed the Medications.     Review of Systems  Anesthesia Hx:  Neg history of prior surgery.   Cardiovascular:   Hypertension Denies MI.  Denies CAD.       Pulmonary:  Pulmonary Normal    Renal/:  Renal/ Normal     Hepatic/GI:  Hepatic/GI Normal    Musculoskeletal:  Musculoskeletal Normal    Neurological:  Neurology Normal    Endocrine:  Endocrine Normal        Physical Exam  General: Well nourished, Cooperative, Alert and Oriented    Airway:  Mallampati: III / II  Mouth Opening: Normal  TM Distance: Normal  Tongue: Normal  Neck ROM: Normal ROM    Dental:  Intact        Anesthesia Plan  Type of Anesthesia, risks & benefits discussed:    Anesthesia Type: Epidural  Intra-op Monitoring Plan: Standard ASA Monitors  Post Op Pain Control Plan: multimodal analgesia and epidural analgesia  Informed Consent: Informed consent signed with the Patient and all parties understand the risks and agree with anesthesia plan.  All questions answered.   ASA Score: 2  Day of Surgery Review of History & Physical: H&P Update referred to the surgeon/provider.    Ready For Surgery From Anesthesia Perspective.     .

## 2023-04-18 NOTE — INTERVAL H&P NOTE
Carie Estrada is 32 y.o.  at 38w3d wga presenting for IOL.     FHT: Baseline 145 bpm, moderate BTBV, +accels, -decels; Cat 1 (reassuring)  [place]: quiet  Presentation: cephalic by ultrasound    SVE: Cl/50/-3    1) Induction of Labor  - Plan for cytotec   - Cooley balloon to be placed    2) gHTN  - Normotensive on admit   - Pre-E Labs pending    3) fibroids   -9 x 9 x 7 on right  - 5 x 2 x 5 on left    Contraception: per Dr. Junior     .        Active Hospital Problems    Diagnosis  POA    Gestational hypertension, third trimester [O13.3]  Unknown    Encounter for induction of labor [Z34.90]  Not Applicable      Resolved Hospital Problems   No resolved problems to display.

## 2023-04-19 PROBLEM — Z34.90 ENCOUNTER FOR INDUCTION OF LABOR: Status: RESOLVED | Noted: 2023-04-18 | Resolved: 2023-04-19

## 2023-04-19 PROCEDURE — 25000003 PHARM REV CODE 250

## 2023-04-19 PROCEDURE — 0502F PR SUBSEQUENT PRENATAL CARE: ICD-10-PCS | Mod: ,,, | Performed by: STUDENT IN AN ORGANIZED HEALTH CARE EDUCATION/TRAINING PROGRAM

## 2023-04-19 PROCEDURE — 72100003 HC LABOR CARE, EA. ADDL. 8 HRS

## 2023-04-19 PROCEDURE — 72100002 HC LABOR CARE, 1ST 8 HOURS

## 2023-04-19 PROCEDURE — 25000003 PHARM REV CODE 250: Performed by: STUDENT IN AN ORGANIZED HEALTH CARE EDUCATION/TRAINING PROGRAM

## 2023-04-19 PROCEDURE — 11000001 HC ACUTE MED/SURG PRIVATE ROOM

## 2023-04-19 PROCEDURE — 0502F SUBSEQUENT PRENATAL CARE: CPT | Mod: ,,, | Performed by: STUDENT IN AN ORGANIZED HEALTH CARE EDUCATION/TRAINING PROGRAM

## 2023-04-19 PROCEDURE — 59400 PR FULL ROUT OBSTE CARE,VAGINAL DELIV: ICD-10-PCS | Mod: AT,,, | Performed by: OBSTETRICS & GYNECOLOGY

## 2023-04-19 PROCEDURE — 72200005 HC VAGINAL DELIVERY LEVEL II

## 2023-04-19 PROCEDURE — 27000181 HC CABLE, IUPC

## 2023-04-19 PROCEDURE — 59400 OBSTETRICAL CARE: CPT | Mod: AT,,, | Performed by: OBSTETRICS & GYNECOLOGY

## 2023-04-19 RX ORDER — HYDROCORTISONE 25 MG/G
CREAM TOPICAL 3 TIMES DAILY PRN
Status: DISCONTINUED | OUTPATIENT
Start: 2023-04-19 | End: 2023-04-21 | Stop reason: HOSPADM

## 2023-04-19 RX ORDER — DOCUSATE SODIUM 100 MG/1
200 CAPSULE, LIQUID FILLED ORAL 2 TIMES DAILY PRN
Status: DISCONTINUED | OUTPATIENT
Start: 2023-04-19 | End: 2023-04-21 | Stop reason: HOSPADM

## 2023-04-19 RX ORDER — MISOPROSTOL 200 UG/1
800 TABLET ORAL
Status: DISCONTINUED | OUTPATIENT
Start: 2023-04-19 | End: 2023-04-21 | Stop reason: HOSPADM

## 2023-04-19 RX ORDER — DIPHENHYDRAMINE HYDROCHLORIDE 50 MG/ML
25 INJECTION INTRAMUSCULAR; INTRAVENOUS EVERY 4 HOURS PRN
Status: DISCONTINUED | OUTPATIENT
Start: 2023-04-19 | End: 2023-04-21 | Stop reason: HOSPADM

## 2023-04-19 RX ORDER — OXYTOCIN/RINGER'S LACTATE 30/500 ML
334 PLASTIC BAG, INJECTION (ML) INTRAVENOUS ONCE AS NEEDED
Status: CANCELLED | OUTPATIENT
Start: 2023-04-19 | End: 2034-09-14

## 2023-04-19 RX ORDER — DIPHENOXYLATE HYDROCHLORIDE AND ATROPINE SULFATE 2.5; .025 MG/1; MG/1
1 TABLET ORAL 4 TIMES DAILY PRN
Status: DISCONTINUED | OUTPATIENT
Start: 2023-04-19 | End: 2023-04-21 | Stop reason: HOSPADM

## 2023-04-19 RX ORDER — ONDANSETRON 8 MG/1
8 TABLET, ORALLY DISINTEGRATING ORAL EVERY 8 HOURS PRN
Status: CANCELLED | OUTPATIENT
Start: 2023-04-19

## 2023-04-19 RX ORDER — METHYLERGONOVINE MALEATE 0.2 MG/ML
200 INJECTION INTRAVENOUS
Status: CANCELLED | OUTPATIENT
Start: 2023-04-19

## 2023-04-19 RX ORDER — PROCHLORPERAZINE EDISYLATE 5 MG/ML
5 INJECTION INTRAMUSCULAR; INTRAVENOUS EVERY 6 HOURS PRN
Status: CANCELLED | OUTPATIENT
Start: 2023-04-19

## 2023-04-19 RX ORDER — TRANEXAMIC ACID 10 MG/ML
1000 INJECTION, SOLUTION INTRAVENOUS ONCE AS NEEDED
Status: CANCELLED | OUTPATIENT
Start: 2023-04-19 | End: 2034-09-14

## 2023-04-19 RX ORDER — PRENATAL WITH FERROUS FUM AND FOLIC ACID 3080; 920; 120; 400; 22; 1.84; 3; 20; 10; 1; 12; 200; 27; 25; 2 [IU]/1; [IU]/1; MG/1; [IU]/1; MG/1; MG/1; MG/1; MG/1; MG/1; MG/1; UG/1; MG/1; MG/1; MG/1; MG/1
1 TABLET ORAL DAILY
Status: DISCONTINUED | OUTPATIENT
Start: 2023-04-19 | End: 2023-04-21 | Stop reason: HOSPADM

## 2023-04-19 RX ORDER — OXYTOCIN 10 [USP'U]/ML
10 INJECTION, SOLUTION INTRAMUSCULAR; INTRAVENOUS ONCE AS NEEDED
Status: CANCELLED | OUTPATIENT
Start: 2023-04-19 | End: 2034-09-14

## 2023-04-19 RX ORDER — OXYTOCIN/RINGER'S LACTATE 30/500 ML
95 PLASTIC BAG, INJECTION (ML) INTRAVENOUS ONCE AS NEEDED
Status: CANCELLED | OUTPATIENT
Start: 2023-04-19 | End: 2034-09-14

## 2023-04-19 RX ORDER — MISOPROSTOL 200 UG/1
800 TABLET ORAL ONCE AS NEEDED
Status: CANCELLED | OUTPATIENT
Start: 2023-04-19

## 2023-04-19 RX ORDER — SODIUM CHLORIDE 0.9 % (FLUSH) 0.9 %
10 SYRINGE (ML) INJECTION EVERY 6 HOURS PRN
Status: CANCELLED | OUTPATIENT
Start: 2023-04-19

## 2023-04-19 RX ORDER — SIMETHICONE 80 MG
1 TABLET,CHEWABLE ORAL EVERY 6 HOURS PRN
Status: DISCONTINUED | OUTPATIENT
Start: 2023-04-19 | End: 2023-04-21 | Stop reason: HOSPADM

## 2023-04-19 RX ORDER — METHYLERGONOVINE MALEATE 0.2 MG/ML
200 INJECTION INTRAVENOUS
Status: DISCONTINUED | OUTPATIENT
Start: 2023-04-19 | End: 2023-04-21 | Stop reason: HOSPADM

## 2023-04-19 RX ORDER — CARBOPROST TROMETHAMINE 250 UG/ML
250 INJECTION, SOLUTION INTRAMUSCULAR
Status: CANCELLED | OUTPATIENT
Start: 2023-04-19

## 2023-04-19 RX ORDER — CARBOPROST TROMETHAMINE 250 UG/ML
250 INJECTION, SOLUTION INTRAMUSCULAR
Status: DISCONTINUED | OUTPATIENT
Start: 2023-04-19 | End: 2023-04-21 | Stop reason: HOSPADM

## 2023-04-19 RX ORDER — MISOPROSTOL 200 UG/1
800 TABLET ORAL ONCE AS NEEDED
Status: CANCELLED | OUTPATIENT
Start: 2023-04-19 | End: 2034-09-14

## 2023-04-19 RX ORDER — ACETAMINOPHEN 325 MG/1
650 TABLET ORAL EVERY 6 HOURS PRN
Status: DISCONTINUED | OUTPATIENT
Start: 2023-04-19 | End: 2023-04-21 | Stop reason: HOSPADM

## 2023-04-19 RX ORDER — IBUPROFEN 600 MG/1
600 TABLET ORAL EVERY 6 HOURS
Status: DISCONTINUED | OUTPATIENT
Start: 2023-04-19 | End: 2023-04-21 | Stop reason: HOSPADM

## 2023-04-19 RX ORDER — HYDROCODONE BITARTRATE AND ACETAMINOPHEN 10; 325 MG/1; MG/1
1 TABLET ORAL EVERY 4 HOURS PRN
Status: DISCONTINUED | OUTPATIENT
Start: 2023-04-19 | End: 2023-04-21 | Stop reason: HOSPADM

## 2023-04-19 RX ORDER — DIPHENHYDRAMINE HCL 25 MG
25 CAPSULE ORAL EVERY 4 HOURS PRN
Status: DISCONTINUED | OUTPATIENT
Start: 2023-04-19 | End: 2023-04-21 | Stop reason: HOSPADM

## 2023-04-19 RX ORDER — OXYTOCIN/RINGER'S LACTATE 30/500 ML
95 PLASTIC BAG, INJECTION (ML) INTRAVENOUS ONCE
Status: DISCONTINUED | OUTPATIENT
Start: 2023-04-19 | End: 2023-04-21 | Stop reason: HOSPADM

## 2023-04-19 RX ORDER — HYDROCODONE BITARTRATE AND ACETAMINOPHEN 5; 325 MG/1; MG/1
1 TABLET ORAL EVERY 4 HOURS PRN
Status: DISCONTINUED | OUTPATIENT
Start: 2023-04-19 | End: 2023-04-21 | Stop reason: HOSPADM

## 2023-04-19 RX ADMIN — IBUPROFEN 600 MG: 600 TABLET, FILM COATED ORAL at 05:04

## 2023-04-19 RX ADMIN — CARBOPROST TROMETHAMINE 250 MCG: 250 INJECTION, SOLUTION INTRAMUSCULAR at 04:04

## 2023-04-19 RX ADMIN — ONDANSETRON 8 MG: 8 TABLET, ORALLY DISINTEGRATING ORAL at 02:04

## 2023-04-19 RX ADMIN — PRENATAL VIT W/ FE FUMARATE-FA TAB 27-0.8 MG 1 TABLET: 27-0.8 TAB at 10:04

## 2023-04-19 RX ADMIN — DIPHENOXYLATE HYDROCHLORIDE AND ATROPINE SULFATE 1 TABLET: 2.5; .025 TABLET ORAL at 04:04

## 2023-04-19 RX ADMIN — CITALOPRAM HYDROBROMIDE 10 MG: 10 TABLET ORAL at 10:04

## 2023-04-19 RX ADMIN — MISOPROSTOL 800 MCG: 200 TABLET ORAL at 04:04

## 2023-04-19 NOTE — PLAN OF CARE
Problem:  Fall Injury Risk  Goal: Absence of Fall, Infant Drop and Related Injury  Outcome: Ongoing, Progressing     Problem: Adult Inpatient Plan of Care  Goal: Plan of Care Review  Outcome: Ongoing, Progressing  Goal: Patient-Specific Goal (Individualized)  Outcome: Ongoing, Progressing  Goal: Absence of Hospital-Acquired Illness or Injury  Outcome: Ongoing, Progressing  Goal: Optimal Comfort and Wellbeing  Outcome: Ongoing, Progressing  Goal: Readiness for Transition of Care  Outcome: Ongoing, Progressing     Problem: Bariatric Environmental Safety  Goal: Safety Maintained with Care  Outcome: Ongoing, Progressing     Problem: Infection  Goal: Absence of Infection Signs and Symptoms  Outcome: Ongoing, Progressing     Problem: Breastfeeding  Goal: Effective Breastfeeding  Outcome: Ongoing, Progressing     Problem: Adjustment to Role Transition (Postpartum Vaginal Delivery)  Goal: Successful Maternal Role Transition  Outcome: Ongoing, Progressing     Problem: Bleeding (Postpartum Vaginal Delivery)  Goal: Hemostasis  Outcome: Ongoing, Progressing     Problem: Infection (Postpartum Vaginal Delivery)  Goal: Absence of Infection Signs/Symptoms  Outcome: Ongoing, Progressing     Problem: Pain (Postpartum Vaginal Delivery)  Goal: Acceptable Pain Control  Outcome: Ongoing, Progressing     Problem: Urinary Retention (Postpartum Vaginal Delivery)  Goal: Effective Urinary Elimination  Outcome: Ongoing, Progressing

## 2023-04-19 NOTE — LACTATION NOTE
04/19/23 1030   Maternal Assessment   Breast Shape Bilateral:;pendulous   Breast Density Bilateral:;soft   Areola Bilateral:;elastic   Nipples Bilateral:;everted   Maternal Infant Feeding   Maternal Emotional State assist needed;anxious   Infant Positioning clutch/football   Signs of Milk Transfer infant jaw motion present   Pain with Feeding no   Comfort Measures Before/During Feeding infant position adjusted;maternal position adjusted;suction broken using finger   Latch Assistance yes  (max to get baby to open mouth wide and not suck tongue or top lip)     Visited patient in room, baby sleeping on back in crib, tightly swaddled.  Basic education provided, Guide reviewed.   Baby easy to wake by un-swaddling blankets.  Maximum positioning and latch assistance provided, R and L breasts, football position, deep latches achieved after several attempts, hand expressed drops of colostrum onto tip of nipple to facilitate baby opening mouth wide enough to get past the nipple onto breast tissue.  Reviewed importance of deep latch. Encouraged patient to call for assistance prn.

## 2023-04-19 NOTE — L&D DELIVERY NOTE
[facility] - Labor & Delivery  Vaginal Delivery   Operative Note    SUMMARY   Resident to bedside for complaints of pressure. Patient found to be complete, +1 and pushed well to +2. During setting up for 2nd stage of labor, patient noted to have a prolonged deceleration to the 60s. Deceleration not improved with maternal repositioning so decision made to deliver.     Normal spontaneous vaginal delivery of live infant after approximately 3 minutes of maternal pushing effort. Infant delivered in OA  presentation. Nuchal cord x2 noted and reduced prior to delivery of fetal body. Infant was placed on mothers abdomen for skin to skin and bulb suctioning performed. Delayed cord clamping was performed. Cord was cut and clamped and cord blood was collected.    Spontaneous delivery of placenta with gentle traction applied. IV pitocin was given noting uterine atony with bleeding. Given history of fibroid uterus, patient was given hemabate and [place] cytotec. Tone and hemostasis excellent at end of case.No trailing membranes were noted on bimanual examination. Placenta was inspected and noted to be intact.    1st degree laceration noted and repaired with single figure of 8 suture of 2-0 vicryl    Patient tolerated delivery well. Sponge, needle, and lap counted correctly x2.     EBL 300cc.      Porsha Butt MD PGY-1  Obstetrics and Gynecology  [facility]        Indications:  (spontaneous vaginal delivery)  Pregnancy complicated by:   Patient Active Problem List   Diagnosis    Adjustment disorder with mixed anxiety and depressed mood    Anxiety    Uterine fibroids affecting pregnancy, unspecified trimester    Back pain in pregnancy    Gestational hypertension, third trimester     (spontaneous vaginal delivery)     Admitting GA: 38w4d    Delivery Information for Shalonda Estrada    Birth information:  YOB: 2022   Time of birth: 4:36 AM   Sex: female   Head Delivery Date/Time: 2023   4:36 AM   Delivery type: Vaginal, Spontaneous   Gestational Age: 38w4d  Unknown    Delivery Providers    Delivering clinician: Deneen To MD   Provider Role    MD Yolanda Hamilton, RN     Jacinta Craft, GEM Valdes, RN     ST [facility]               Measurements    Weight: 2810 g  Weight (lbs): 6 lb 3.1 oz  Length:          Apgars    Living status: Living  Apgars:  1 min.:  5 min.:  10 min.:  15 min.:  20 min.:    Skin color:  0  1       Heart rate:  2  2       Reflex irritability:  2  2       Muscle tone:  2  2       Respiratory effort:  2  2       Total:  8  9       Apgars assigned by: MARISSA MONGE         Operative Delivery    Forceps attempted?: No  Vacuum extractor attempted?: No         Shoulder Dystocia    Shoulder dystocia present?: No           Presentation    Presentation: Vertex  Position: Occiput Anterior           Interventions/Resuscitation    Method: Bulb Suctioning, Tactile Stimulation, Deep Suctioning       Cord    Vessels: 3 vessels  Complications: Nuchal  Nuchal Intervention: reduced  Nuchal Cord Description: loose nuchal cord  Number of Loops: 1  Delayed Cord Clamping?: Yes  Cord Clamped Date/Time: 2023  4:38 AM  Cord Blood Disposition: Sent with Baby  Gases Sent?: No  Stem Cell Collection (by MD): No       Placenta    Placenta delivery date/time: 2023 0340  Placenta removal: Spontaneous  Placenta appearance: Intact  Placenta disposition: Discarded           Labor Events:       labor: No     Labor Onset Date/Time:         Dilation Complete Date/Time: 2023 04:15     Start Pushing Date/Time:         Start Pushing Date/Time:       Rupture Date/Time: 235           Rupture type: ARM (Artificial Rupture)           Fluid Amount:         Fluid Color: Clear                 steroids: None     Antibiotics given for GBS: No     Induction: balloon dilation (Cooley);misoprostol     Indications for induction:   Hypertension     Augmentation: amniotomy;oxytocin     Indications for augmentation: Ineffective Contraction Pattern     Labor complications: None     Additional complications:          Cervical ripening:                     Delivery:      Episiotomy: None     Indication for Episiotomy:       Perineal Lacerations: None Repaired:      Periurethral Laceration:   Repaired:     Labial Laceration:   Repaired:     Sulcus Laceration:   Repaired:     Vaginal Laceration: Yes Repaired: Yes   Cervical Laceration:   Repaired:     Repair suture:       Repair # of packets: 1     Last Value - EBL - Nursing (mL):       Sum - EBL - Nursing (mL): 0     Last Value - EBL - Anesthesia (mL):        Calculated QBL (mL):         Vaginal Sweep Performed: No     Surgicount Correct: Yes     Vaginal Packing: No Quantity:       Other providers:       Anesthesia    Method: Epidural          Details (if applicable):  Trial of Labor      Categorization:      Priority:     Indications for :     Incision Type:       Additional  information:  Forceps:    Vacuum:    Breech:    Observed anomalies    Other (Comments): Baby with acrocyanosis at 2.5 mins while on mom's chest. Brought baby to warmer, deep sxn x3, bulb sxn, CPT,  pulse ox applied. Baby improved and returned to mom.

## 2023-04-19 NOTE — ANESTHESIA PROCEDURE NOTES
Epidural    Patient location during procedure: OB   Reason for block: primary anesthetic   Reason for block: labor analgesia requested by patient and obstetrician  Diagnosis: iup   Start time: 4/18/2023 8:00 PM  Timeout: 4/18/2023 8:00 PM  End time: 4/18/2023 8:05 PM  Surgery related to: Vaginal Delivery    Staffing  Performing Provider: Ben Gary MD  Authorizing Provider: Camron Pichardo MD        Preanesthetic Checklist  Completed: patient identified, IV checked, site marked, risks and benefits discussed, surgical consent, monitors and equipment checked, pre-op evaluation, timeout performed, anesthesia consent given, hand hygiene performed and patient being monitored  Preparation  Patient position: sitting  Prep: ChloraPrep  Patient monitoring: Pulse Ox  Reason for block: primary anesthetic   Epidural  Skin Anesthetic: lidocaine 1%  Skin Wheal: 3 mL  Administration type: continuous  Approach: midline  Interspace: L3-4    Injection technique: CHAN saline  Needle and Epidural Catheter  Needle type: Tuohy   Needle gauge: 17  Needle length: 3.5 inches  Needle insertion depth: 6 cm  Catheter type: [place]  Catheter size: 19 G  Catheter at skin depth: 11 cm  Insertion Attempts: 1  Test dose: 3 mL of lidocaine 1.5% with Epi 1-to-200,000  Additional Documentation: incremental injection, negative aspiration for heme and CSF, no paresthesia on injection, no signs/symptoms of IV or SA injection, no significant pain on injection and no significant complaints from patient  Needle localization: anatomical landmarks  Medications:  Volume per aspiration: 5 mL  Time between aspirations: 5 minutes   Assessment  Ease of block: easy  Patient's tolerance of the procedure: comfortable throughout block and no complaints No inadvertent dural puncture with Tuohy.  Dural puncture performed with spinal needle.

## 2023-04-19 NOTE — PLAN OF CARE
"Plan of care:  Mother will nurse baby on cue " 8 or more in 24" and lengthen feedings until content; will achieve a deep, comfortable latch and observe for signs of milk transfer; will monitor baby's 24hr diaper counts; will call for assistance prn.   "

## 2023-04-20 LAB
BASOPHILS # BLD AUTO: 0.03 K/UL (ref 0–0.2)
BASOPHILS NFR BLD: 0.3 % (ref 0–1.9)
DIFFERENTIAL METHOD: ABNORMAL
EOSINOPHIL # BLD AUTO: 0.2 K/UL (ref 0–0.5)
EOSINOPHIL NFR BLD: 1.4 % (ref 0–8)
ERYTHROCYTE [DISTWIDTH] IN BLOOD BY AUTOMATED COUNT: 13.6 % (ref 11.5–14.5)
HCT VFR BLD AUTO: 27.6 % (ref 37–48.5)
HGB BLD-MCNC: 9 G/DL (ref 12–16)
IMM GRANULOCYTES # BLD AUTO: 0.07 K/UL (ref 0–0.04)
IMM GRANULOCYTES NFR BLD AUTO: 0.6 % (ref 0–0.5)
LYMPHOCYTES # BLD AUTO: 1.8 K/UL (ref 1–4.8)
LYMPHOCYTES NFR BLD: 16.2 % (ref 18–48)
MCH RBC QN AUTO: 30.3 PG (ref 27–31)
MCHC RBC AUTO-ENTMCNC: 32.6 G/DL (ref 32–36)
MCV RBC AUTO: 93 FL (ref 82–98)
MONOCYTES # BLD AUTO: 1.2 K/UL (ref 0.3–1)
MONOCYTES NFR BLD: 10.4 % (ref 4–15)
NEUTROPHILS # BLD AUTO: 8 K/UL (ref 1.8–7.7)
NEUTROPHILS NFR BLD: 71.1 % (ref 38–73)
NRBC BLD-RTO: 0 /100 WBC
PLATELET # BLD AUTO: 206 K/UL (ref 150–450)
PMV BLD AUTO: 9.7 FL (ref 9.2–12.9)
RBC # BLD AUTO: 2.97 M/UL (ref 4–5.4)
WBC # BLD AUTO: 11.29 K/UL (ref 3.9–12.7)

## 2023-04-20 PROCEDURE — 99024 POSTOP FOLLOW-UP VISIT: CPT | Mod: ,,, | Performed by: OBSTETRICS & GYNECOLOGY

## 2023-04-20 PROCEDURE — 85025 COMPLETE CBC W/AUTO DIFF WBC: CPT | Performed by: STUDENT IN AN ORGANIZED HEALTH CARE EDUCATION/TRAINING PROGRAM

## 2023-04-20 PROCEDURE — 25000003 PHARM REV CODE 250

## 2023-04-20 PROCEDURE — 25000003 PHARM REV CODE 250: Performed by: STUDENT IN AN ORGANIZED HEALTH CARE EDUCATION/TRAINING PROGRAM

## 2023-04-20 PROCEDURE — 99024 PR POST-OP FOLLOW-UP VISIT: ICD-10-PCS | Mod: ,,, | Performed by: OBSTETRICS & GYNECOLOGY

## 2023-04-20 PROCEDURE — 36415 COLL VENOUS BLD VENIPUNCTURE: CPT | Performed by: STUDENT IN AN ORGANIZED HEALTH CARE EDUCATION/TRAINING PROGRAM

## 2023-04-20 PROCEDURE — 11000001 HC ACUTE MED/SURG PRIVATE ROOM

## 2023-04-20 RX ORDER — DOCUSATE SODIUM 100 MG/1
100 CAPSULE, LIQUID FILLED ORAL 2 TIMES DAILY PRN
Qty: 60 CAPSULE | Refills: 0 | Status: SHIPPED | OUTPATIENT
Start: 2023-04-20 | End: 2023-05-30

## 2023-04-20 RX ORDER — IBUPROFEN 600 MG/1
600 TABLET ORAL EVERY 6 HOURS
Qty: 60 TABLET | Refills: 1 | Status: SHIPPED | OUTPATIENT
Start: 2023-04-20 | End: 2023-05-30

## 2023-04-20 RX ADMIN — IBUPROFEN 600 MG: 600 TABLET, FILM COATED ORAL at 06:04

## 2023-04-20 RX ADMIN — CITALOPRAM HYDROBROMIDE 10 MG: 10 TABLET ORAL at 11:04

## 2023-04-20 RX ADMIN — IBUPROFEN 600 MG: 600 TABLET, FILM COATED ORAL at 11:04

## 2023-04-20 RX ADMIN — IBUPROFEN 600 MG: 600 TABLET, FILM COATED ORAL at 12:04

## 2023-04-20 RX ADMIN — DOCUSATE SODIUM 200 MG: 100 CAPSULE, LIQUID FILLED ORAL at 06:04

## 2023-04-20 NOTE — NURSING
The following message was sent to dr mills's staff: Hi, can you please call ms olsen to set up a post partum bp&mood check appt. Thank you

## 2023-04-20 NOTE — PHYSICIAN QUERY
PT Name: Carie Estrada  MR #: [id number]     DOCUMENTATION CLARIFICATION     CDS/: GIOVANA Longo,RNC-MNN        Contact information:[email or web address]  This form is a permanent document in the medical record.    Query Date: April 20, 2023  By submitting this query, we are merely seeking further clarification of documentation. Please utilize your independent clinical judgment when addressing the question(s) below.      Indicators Supporting Clinical Findings Location in Medical Record   X Documentation of uterine atony with bleeding, post-partum bleeding, or hemorrhage noting uterine atony with bleeding L&D Delivery note 4/19    Documentation of retained placenta     X Delivery type with EBL or QBL Normal spontaneous vaginal delivery of live infant after approximately 3 minutes of maternal pushing effort    QBL: 300 L&D Delivery note 4/19   X H/H Recent Labs   Lab 04/18/23  0041 04/20/23  0502   WBC 8.27 11.29   HGB 10.7* 9.0*   HCT 31.3* 27.6*    OB progress note 4/20@935am    Vital signs     X Medications, Treatment IV pitocin was given noting uterine atony with bleeding. Given history of fibroid uterus, patient was given hemabate and [place] cytotec. Tone and hemostasis excellent at end of case L&D Delivery note 4/19    Blood transfusion      Other          Provider, please specify the diagnosis or diagnoses associated with the above clinical findings:      [   ] Immediate post-partum hemorrhage   [   ] Postpartum uterine atony without hemorrhage   [   ] Other hematological diagnosis (please specify): _________________   [ x ] Clinically undetermined     Patient had uterine atony without hemorrhage. Uterotonics were given as treatment of atony and preventative measure against hemorrhage.    Please document in your progress notes daily for the duration of treatment, until resolved, and include in your discharge summary.  (Form 23259)      Deneen To MD  4/21/2023 8:38  AM

## 2023-04-20 NOTE — ANESTHESIA POSTPROCEDURE EVALUATION
Anesthesia Post Evaluation    Patient: Carie Estrada    Procedure(s) Performed: * No procedures listed *    Final Anesthesia Type: epidural      Patient location during evaluation: med/surg floor  Patient participation: Yes- Able to Participate  Level of consciousness: awake and alert  Post-procedure vital signs: reviewed and stable  Pain management: adequate  Airway patency: patent  AUBREY mitigation strategies: Multimodal analgesia and Use of major conduction anesthesia (spinal/epidural) or peripheral nerve block  PONV status at discharge: No PONV  Anesthetic complications: no      Cardiovascular status: blood pressure returned to baseline and hemodynamically stable  Respiratory status: unassisted, spontaneous ventilation and room air  Hydration status: euvolemic  Follow-up not needed.          Vitals Value Taken Time   /73 04/20/23 0901   Temp 36.6 °C (97.9 °F) 04/20/23 0901   Pulse 75 04/20/23 0901   Resp 18 04/20/23 0901   SpO2 96 % 04/20/23 0901         No case tracking events are documented in the log.      Pain/Quentin Score: Pain Rating Prior to Med Admin: 5 (4/20/2023  6:01 AM)

## 2023-04-20 NOTE — DISCHARGE SUMMARY
Delivery Discharge Summary  Obstetrics      Primary OB Clinician: Rose Cordova, *      Admission date: 2023  Discharge date: 2023    Disposition: To home, self care    Discharge Diagnosis List:      Patient Active Problem List   Diagnosis    Adjustment disorder with mixed anxiety and depressed mood    Anxiety    Uterine fibroids affecting pregnancy, unspecified trimester    Back pain in pregnancy    Gestational hypertension, third trimester     (spontaneous vaginal delivery)       Procedure:     Hospital Course:  Carie Estrada is a 32 y.o. now , PPD #2 who was admitted on 2023 at 38w3d for IOL due to gHTN. Patient was subsequently admitted to labor and delivery unit with signed consents.     Labor course was uncomplicated and resulted in  without complications. Please see delivery note for further details.   Her postpartum course was uncomplicated. She never required magnesium sulfate or oral antihypertensive agents.  On discharge day, patient's pain is controlled with oral pain medications. Pt is tolerating ambulation without SOB or CP, and regular diet without N/V. Reports lochia is mild. Denies any HA, vision changes, F/C, LE swelling. Denies any breast pain/soreness.    Pt in stable condition and ready for discharge. She has been instructed to start and/or continue medications and follow up with her obstetrics provider as listed below.    Pertinent studies:  CBC  Recent Labs   Lab 23  0041 23  0502   WBC 8.27 11.29   HGB 10.7* 9.0*   HCT 31.3* 27.6*   MCV 91 93    206          Immunization History   Administered Date(s) Administered    COVID-19, MRNA, LN-S, PF (MODERNA FULL 0.5 ML DOSE) 2021    DTaP 1991, 01/15/1992, 1992, 1993, 10/03/1995    HIB 1991, 01/15/1992, 1992, 1992    Hepatitis B, Pediatric/Adolescent 2000, 2000, 2000    IPV 1991, 01/15/1992, 1992, 1993,  10/03/1995    Influenza - Quadrivalent - MDCK - PF 10/18/2022    MMR 12/30/1992, 10/03/1995    Td (ADULT) 09/20/2003    Tdap 02/14/2023        Delivery:    Episiotomy: None   Lacerations: None   Repair suture:     Repair # of packets: 1   Blood loss (ml):       Birth information:  YOB: 2022   Time of birth: 4:36 AM   Sex: female   Delivery type: Vaginal, Spontaneous   Gestational Age: 38w4d    Delivery Clinician:      Other providers:       Additional  information:  Forceps:    Vacuum:    Breech:    Observed anomalies      Living?:           APGARS  One minute Five minutes Ten minutes   Skin color:         Heart rate:         Grimace:         Muscle tone:         Breathing:         Totals: 8  9        Placenta: Delivered:       appearance    Patient Instructions:   Current Discharge Medication List        START taking these medications    Details   docusate sodium (COLACE) 100 MG capsule Take 1 capsule (100 mg total) by mouth 2 (two) times daily as needed for Constipation.  Qty: 60 capsule, Refills: 0      ibuprofen (ADVIL,MOTRIN) 600 MG tablet Take 1 tablet (600 mg total) by mouth every 6 (six) hours.  Qty: 60 tablet, Refills: 1           CONTINUE these medications which have NOT CHANGED    Details   citalopram (CELEXA) 10 MG tablet Take 1 tablet (10 mg total) by mouth once daily.  Qty: 90 tablet, Refills: 2    Associated Diagnoses: Anxiety in pregnancy in second trimester, antepartum      prenatal 25/iron fum/folic/dha (PRENATAL-1 ORAL) Take by mouth.             Discharge Procedure Orders   Diet Adult Regular        Follow-up Information       Rose Cordova MD Follow up in 1 week(s).    Specialty: Obstetrics and Gynecology  Why: BP check, Mood check  Contact information:  [address]  [address]  [place] [id number] [phone]

## 2023-04-20 NOTE — NURSING
RN set pt up on the Medela pump per pt's request to stimulate her breast more to increase milk production.    Medela Symphony pump, tubing, collections containers and labels brought to bedside.  Discussed proper pump setting of initiation phase.  Instructed on proper usage of pump and to adjust suction according to maximum comfort level.  Verified appropriate flange fit.  Educated on the frequency and duration of pumping in order to promote and maintain a full milk supply.  Hands on pumping technique reviewed.  Encouraged hand expression after pumping.  Instructed on cleaning of breast pump parts.  Written instructions also given.  Pt verbalized understanding and appropriate recall for proper milk handling, collection, labeling, storage and transportation. Medela Symphony pump, tubing, collections containers and labels brought to bedside.  Discussed proper pump setting of initiation phase.  Instructed on proper usage of pump and to adjust suction according to maximum comfort level.  Verified appropriate flange fit.  Educated on the frequency and duration of pumping in order to promote and maintain a full milk supply.  Hands on pumping technique reviewed.  Encouraged hand expression after pumping.  Instructed on cleaning of breast pump parts.  Written instructions also given.  Pt verbalized understanding and appropriate recall for proper milk handling, collection, labeling, storage and transportation.

## 2023-04-20 NOTE — PLAN OF CARE
Pt ambulating and voiding without difficulty. Patient safety maintained, side rails up x2, bed low and locked position.  Pain well controlled with scheduled pain medication. Fundus midline, firm, with light lochia. VSS. Significant other at bedside; parents responding to infant cues and bonding appropriately.

## 2023-04-21 ENCOUNTER — NURSE TRIAGE (OUTPATIENT)
Dept: ADMINISTRATIVE | Facility: CLINIC | Age: 33
End: 2023-04-21
Payer: COMMERCIAL

## 2023-04-21 ENCOUNTER — HOSPITAL ENCOUNTER (OUTPATIENT)
Facility: OTHER | Age: 33
Discharge: HOME | End: 2023-04-23
Attending: OBSTETRICS & GYNECOLOGY | Admitting: OBSTETRICS & GYNECOLOGY
Payer: COMMERCIAL

## 2023-04-21 VITALS
DIASTOLIC BLOOD PRESSURE: 90 MMHG | SYSTOLIC BLOOD PRESSURE: 134 MMHG | OXYGEN SATURATION: 97 % | TEMPERATURE: 98 F | RESPIRATION RATE: 18 BRPM | HEART RATE: 63 BPM

## 2023-04-21 DIAGNOSIS — E66.01 CLASS 3 SEVERE OBESITY DUE TO EXCESS CALORIES WITH BODY MASS INDEX (BMI) OF 45.0 TO 49.9 IN ADULT, UNSPECIFIED WHETHER SERIOUS COMORBIDITY PRESENT: ICD-10-CM

## 2023-04-21 DIAGNOSIS — F41.9 ANXIETY: ICD-10-CM

## 2023-04-21 DIAGNOSIS — E66.01 CLASS 3 SEVERE OBESITY DUE TO EXCESS CALORIES WITHOUT SERIOUS COMORBIDITY WITH BODY MASS INDEX (BMI) OF 45.0 TO 49.9 IN ADULT: ICD-10-CM

## 2023-04-21 DIAGNOSIS — O14.10 PREECLAMPSIA, SEVERE: Primary | ICD-10-CM

## 2023-04-21 DIAGNOSIS — R07.89 CHEST PRESSURE: ICD-10-CM

## 2023-04-21 DIAGNOSIS — F43.23 ADJUSTMENT DISORDER WITH MIXED ANXIETY AND DEPRESSED MOOD: ICD-10-CM

## 2023-04-21 DIAGNOSIS — Z34.90 ENCOUNTER FOR INDUCTION OF LABOR: ICD-10-CM

## 2023-04-21 LAB
ALBUMIN SERPL BCP-MCNC: 2.9 G/DL (ref 3.5–5.2)
ALP SERPL-CCNC: 118 U/L (ref 55–135)
ALT SERPL W/O P-5'-P-CCNC: 38 U/L (ref 10–44)
AMYLASE SERPL-CCNC: 47 U/L (ref 20–110)
ANION GAP SERPL CALC-SCNC: 9 MMOL/L (ref 8–16)
AST SERPL-CCNC: 33 U/L (ref 10–40)
BASOPHILS # BLD AUTO: 0.01 K/UL (ref 0–0.2)
BASOPHILS NFR BLD: 0.1 % (ref 0–1.9)
BILIRUB SERPL-MCNC: 0.3 MG/DL (ref 0.1–1)
BLD PROD TYP BPU: NORMAL
BLOOD UNIT EXPIRATION DATE: NORMAL
BLOOD UNIT TYPE CODE: 5100
BLOOD UNIT TYPE: NORMAL
BUN SERPL-MCNC: 7 MG/DL (ref 6–20)
CALCIUM SERPL-MCNC: 8.9 MG/DL (ref 8.7–10.5)
CHLORIDE SERPL-SCNC: 109 MMOL/L (ref 95–110)
CO2 SERPL-SCNC: 21 MMOL/L (ref 23–29)
CODING SYSTEM: NORMAL
CREAT SERPL-MCNC: 0.7 MG/DL (ref 0.5–1.4)
CROSSMATCH INTERPRETATION: NORMAL
DIFFERENTIAL METHOD: ABNORMAL
DISPENSE STATUS: NORMAL
EOSINOPHIL # BLD AUTO: 0.1 K/UL (ref 0–0.5)
EOSINOPHIL NFR BLD: 1.4 % (ref 0–8)
ERYTHROCYTE [DISTWIDTH] IN BLOOD BY AUTOMATED COUNT: 13.5 % (ref 11.5–14.5)
EST. GFR  (NO RACE VARIABLE): >60 ML/MIN/1.73 M^2
GLUCOSE SERPL-MCNC: 80 MG/DL (ref 70–110)
HCT VFR BLD AUTO: 26 % (ref 37–48.5)
HGB BLD-MCNC: 8.6 G/DL (ref 12–16)
IMM GRANULOCYTES # BLD AUTO: 0.09 K/UL (ref 0–0.04)
IMM GRANULOCYTES NFR BLD AUTO: 1.3 % (ref 0–0.5)
LIPASE SERPL-CCNC: 8 U/L (ref 4–60)
LYMPHOCYTES # BLD AUTO: 1.5 K/UL (ref 1–4.8)
LYMPHOCYTES NFR BLD: 21.2 % (ref 18–48)
MCH RBC QN AUTO: 30.5 PG (ref 27–31)
MCHC RBC AUTO-ENTMCNC: 33.1 G/DL (ref 32–36)
MCV RBC AUTO: 92 FL (ref 82–98)
MONOCYTES # BLD AUTO: 0.8 K/UL (ref 0.3–1)
MONOCYTES NFR BLD: 11 % (ref 4–15)
NEUTROPHILS # BLD AUTO: 4.6 K/UL (ref 1.8–7.7)
NEUTROPHILS NFR BLD: 65 % (ref 38–73)
NRBC BLD-RTO: 0 /100 WBC
NUM UNITS TRANS PACKED RBC: NORMAL
PLATELET # BLD AUTO: 233 K/UL (ref 150–450)
PMV BLD AUTO: 9.6 FL (ref 9.2–12.9)
POTASSIUM SERPL-SCNC: 3.8 MMOL/L (ref 3.5–5.1)
PROT SERPL-MCNC: 5.9 G/DL (ref 6–8.4)
RBC # BLD AUTO: 2.82 M/UL (ref 4–5.4)
SODIUM SERPL-SCNC: 139 MMOL/L (ref 136–145)
WBC # BLD AUTO: 7.11 K/UL (ref 3.9–12.7)

## 2023-04-21 PROCEDURE — 99285 PR EMERGENCY DEPT VISIT,LEVEL V: ICD-10-PCS | Mod: ,,, | Performed by: OBSTETRICS & GYNECOLOGY

## 2023-04-21 PROCEDURE — 83690 ASSAY OF LIPASE: CPT | Performed by: STUDENT IN AN ORGANIZED HEALTH CARE EDUCATION/TRAINING PROGRAM

## 2023-04-21 PROCEDURE — 25000003 PHARM REV CODE 250: Performed by: STUDENT IN AN ORGANIZED HEALTH CARE EDUCATION/TRAINING PROGRAM

## 2023-04-21 PROCEDURE — 99285 EMERGENCY DEPT VISIT HI MDM: CPT | Mod: 25

## 2023-04-21 PROCEDURE — 85025 COMPLETE CBC W/AUTO DIFF WBC: CPT | Performed by: STUDENT IN AN ORGANIZED HEALTH CARE EDUCATION/TRAINING PROGRAM

## 2023-04-21 PROCEDURE — 25000003 PHARM REV CODE 250

## 2023-04-21 PROCEDURE — 63600175 PHARM REV CODE 636 W HCPCS: Performed by: STUDENT IN AN ORGANIZED HEALTH CARE EDUCATION/TRAINING PROGRAM

## 2023-04-21 PROCEDURE — 99285 EMERGENCY DEPT VISIT HI MDM: CPT | Mod: ,,, | Performed by: OBSTETRICS & GYNECOLOGY

## 2023-04-21 PROCEDURE — 80053 COMPREHEN METABOLIC PANEL: CPT | Performed by: STUDENT IN AN ORGANIZED HEALTH CARE EDUCATION/TRAINING PROGRAM

## 2023-04-21 PROCEDURE — 96375 TX/PRO/DX INJ NEW DRUG ADDON: CPT

## 2023-04-21 PROCEDURE — 82150 ASSAY OF AMYLASE: CPT | Performed by: STUDENT IN AN ORGANIZED HEALTH CARE EDUCATION/TRAINING PROGRAM

## 2023-04-21 PROCEDURE — 96365 THER/PROPH/DIAG IV INF INIT: CPT

## 2023-04-21 PROCEDURE — G0378 HOSPITAL OBSERVATION PER HR: HCPCS

## 2023-04-21 PROCEDURE — 96366 THER/PROPH/DIAG IV INF ADDON: CPT

## 2023-04-21 RX ORDER — DOCUSATE SODIUM 100 MG/1
200 CAPSULE, LIQUID FILLED ORAL 2 TIMES DAILY PRN
Status: DISCONTINUED | OUTPATIENT
Start: 2023-04-21 | End: 2023-04-23 | Stop reason: HOSPADM

## 2023-04-21 RX ORDER — MAGNESIUM SULFATE HEPTAHYDRATE 40 MG/ML
4 INJECTION, SOLUTION INTRAVENOUS ONCE
Status: COMPLETED | OUTPATIENT
Start: 2023-04-21 | End: 2023-04-21

## 2023-04-21 RX ORDER — SIMETHICONE 80 MG
1 TABLET,CHEWABLE ORAL EVERY 6 HOURS PRN
Status: DISCONTINUED | OUTPATIENT
Start: 2023-04-21 | End: 2023-04-23 | Stop reason: HOSPADM

## 2023-04-21 RX ORDER — ONDANSETRON 8 MG/1
8 TABLET, ORALLY DISINTEGRATING ORAL EVERY 8 HOURS PRN
Status: DISCONTINUED | OUTPATIENT
Start: 2023-04-21 | End: 2023-04-23 | Stop reason: HOSPADM

## 2023-04-21 RX ORDER — MAGNESIUM SULFATE HEPTAHYDRATE 40 MG/ML
2 INJECTION, SOLUTION INTRAVENOUS CONTINUOUS
Status: DISCONTINUED | OUTPATIENT
Start: 2023-04-21 | End: 2023-04-23

## 2023-04-21 RX ORDER — DIPHENOXYLATE HYDROCHLORIDE AND ATROPINE SULFATE 2.5; .025 MG/1; MG/1
1 TABLET ORAL 4 TIMES DAILY PRN
Status: DISCONTINUED | OUTPATIENT
Start: 2023-04-21 | End: 2023-04-23 | Stop reason: HOSPADM

## 2023-04-21 RX ORDER — METOCLOPRAMIDE HYDROCHLORIDE 5 MG/ML
10 INJECTION INTRAMUSCULAR; INTRAVENOUS ONCE
Status: COMPLETED | OUTPATIENT
Start: 2023-04-21 | End: 2023-04-21

## 2023-04-21 RX ORDER — CITALOPRAM 10 MG/1
10 TABLET ORAL DAILY
Status: DISCONTINUED | OUTPATIENT
Start: 2023-04-22 | End: 2023-04-22

## 2023-04-21 RX ORDER — IBUPROFEN 600 MG/1
600 TABLET ORAL EVERY 6 HOURS
Status: DISCONTINUED | OUTPATIENT
Start: 2023-04-22 | End: 2023-04-23 | Stop reason: HOSPADM

## 2023-04-21 RX ORDER — ACETAMINOPHEN 500 MG
1000 TABLET ORAL ONCE
Status: COMPLETED | OUTPATIENT
Start: 2023-04-21 | End: 2023-04-21

## 2023-04-21 RX ORDER — PROCHLORPERAZINE EDISYLATE 5 MG/ML
5 INJECTION INTRAMUSCULAR; INTRAVENOUS EVERY 6 HOURS PRN
Status: DISCONTINUED | OUTPATIENT
Start: 2023-04-21 | End: 2023-04-23 | Stop reason: HOSPADM

## 2023-04-21 RX ORDER — CALCIUM CARBONATE 200(500)MG
500 TABLET,CHEWABLE ORAL 3 TIMES DAILY PRN
Status: DISCONTINUED | OUTPATIENT
Start: 2023-04-21 | End: 2023-04-23 | Stop reason: HOSPADM

## 2023-04-21 RX ORDER — CALCIUM GLUCONATE 98 MG/ML
1 INJECTION, SOLUTION INTRAVENOUS
Status: DISCONTINUED | OUTPATIENT
Start: 2023-04-21 | End: 2023-04-23 | Stop reason: HOSPADM

## 2023-04-21 RX ORDER — DIPHENHYDRAMINE HYDROCHLORIDE 50 MG/ML
25 INJECTION INTRAMUSCULAR; INTRAVENOUS EVERY 4 HOURS PRN
Status: DISCONTINUED | OUTPATIENT
Start: 2023-04-21 | End: 2023-04-23 | Stop reason: HOSPADM

## 2023-04-21 RX ORDER — DIPHENHYDRAMINE HCL 25 MG
25 CAPSULE ORAL EVERY 4 HOURS PRN
Status: DISCONTINUED | OUTPATIENT
Start: 2023-04-21 | End: 2023-04-23 | Stop reason: HOSPADM

## 2023-04-21 RX ORDER — NIFEDIPINE 10 MG/1
10 CAPSULE ORAL ONCE
Status: COMPLETED | OUTPATIENT
Start: 2023-04-21 | End: 2023-04-21

## 2023-04-21 RX ORDER — NIFEDIPINE 30 MG/1
30 TABLET, EXTENDED RELEASE ORAL DAILY
Status: DISCONTINUED | OUTPATIENT
Start: 2023-04-21 | End: 2023-04-23 | Stop reason: HOSPADM

## 2023-04-21 RX ORDER — HYDROCODONE BITARTRATE AND ACETAMINOPHEN 10; 325 MG/1; MG/1
1 TABLET ORAL EVERY 4 HOURS PRN
Status: DISCONTINUED | OUTPATIENT
Start: 2023-04-21 | End: 2023-04-23 | Stop reason: HOSPADM

## 2023-04-21 RX ORDER — LIDOCAINE HYDROCHLORIDE 10 MG/ML
10 INJECTION INFILTRATION; PERINEURAL ONCE AS NEEDED
Status: DISCONTINUED | OUTPATIENT
Start: 2023-04-21 | End: 2023-04-23 | Stop reason: HOSPADM

## 2023-04-21 RX ORDER — HYDROCODONE BITARTRATE AND ACETAMINOPHEN 5; 325 MG/1; MG/1
1 TABLET ORAL EVERY 4 HOURS PRN
Status: DISCONTINUED | OUTPATIENT
Start: 2023-04-21 | End: 2023-04-23 | Stop reason: HOSPADM

## 2023-04-21 RX ORDER — DIPHENHYDRAMINE HYDROCHLORIDE 50 MG/ML
25 INJECTION INTRAMUSCULAR; INTRAVENOUS ONCE
Status: COMPLETED | OUTPATIENT
Start: 2023-04-21 | End: 2023-04-21

## 2023-04-21 RX ORDER — PRENATAL WITH FERROUS FUM AND FOLIC ACID 3080; 920; 120; 400; 22; 1.84; 3; 20; 10; 1; 12; 200; 27; 25; 2 [IU]/1; [IU]/1; MG/1; [IU]/1; MG/1; MG/1; MG/1; MG/1; MG/1; MG/1; UG/1; MG/1; MG/1; MG/1; MG/1
1 TABLET ORAL DAILY
Status: DISCONTINUED | OUTPATIENT
Start: 2023-04-22 | End: 2023-04-23 | Stop reason: HOSPADM

## 2023-04-21 RX ORDER — ACETAMINOPHEN 325 MG/1
650 TABLET ORAL EVERY 6 HOURS PRN
Status: DISCONTINUED | OUTPATIENT
Start: 2023-04-21 | End: 2023-04-23 | Stop reason: HOSPADM

## 2023-04-21 RX ORDER — FERROUS SULFATE 325(65) MG
325 TABLET ORAL
Qty: 30 TABLET | Refills: 1 | Status: SHIPPED | OUTPATIENT
Start: 2023-04-21

## 2023-04-21 RX ORDER — HYDROCORTISONE 25 MG/G
CREAM TOPICAL 3 TIMES DAILY PRN
Status: DISCONTINUED | OUTPATIENT
Start: 2023-04-21 | End: 2023-04-23 | Stop reason: HOSPADM

## 2023-04-21 RX ORDER — SODIUM CHLORIDE, SODIUM LACTATE, POTASSIUM CHLORIDE, CALCIUM CHLORIDE 600; 310; 30; 20 MG/100ML; MG/100ML; MG/100ML; MG/100ML
INJECTION, SOLUTION INTRAVENOUS CONTINUOUS
Status: DISCONTINUED | OUTPATIENT
Start: 2023-04-21 | End: 2023-04-23

## 2023-04-21 RX ADMIN — IBUPROFEN 600 MG: 600 TABLET, FILM COATED ORAL at 01:04

## 2023-04-21 RX ADMIN — SODIUM CHLORIDE, POTASSIUM CHLORIDE, SODIUM LACTATE AND CALCIUM CHLORIDE: 600; 310; 30; 20 INJECTION, SOLUTION INTRAVENOUS at 09:04

## 2023-04-21 RX ADMIN — NIFEDIPINE 10 MG: 10 CAPSULE ORAL at 09:04

## 2023-04-21 RX ADMIN — NIFEDIPINE 30 MG: 30 TABLET, FILM COATED, EXTENDED RELEASE ORAL at 09:04

## 2023-04-21 RX ADMIN — MAGNESIUM SULFATE HEPTAHYDRATE 4 G: 40 INJECTION, SOLUTION INTRAVENOUS at 09:04

## 2023-04-21 RX ADMIN — MAGNESIUM SULFATE IN WATER 2 G/HR: 40 INJECTION, SOLUTION INTRAVENOUS at 09:04

## 2023-04-21 RX ADMIN — PRENATAL VIT W/ FE FUMARATE-FA TAB 27-0.8 MG 1 TABLET: 27-0.8 TAB at 09:04

## 2023-04-21 RX ADMIN — CITALOPRAM HYDROBROMIDE 10 MG: 10 TABLET ORAL at 09:04

## 2023-04-21 RX ADMIN — ACETAMINOPHEN 1000 MG: 500 TABLET, FILM COATED ORAL at 08:04

## 2023-04-21 RX ADMIN — METOCLOPRAMIDE 10 MG: 5 INJECTION, SOLUTION INTRAMUSCULAR; INTRAVENOUS at 08:04

## 2023-04-21 RX ADMIN — IBUPROFEN 600 MG: 600 TABLET, FILM COATED ORAL at 10:04

## 2023-04-21 RX ADMIN — DIPHENHYDRAMINE HYDROCHLORIDE 25 MG: 50 INJECTION INTRAMUSCULAR; INTRAVENOUS at 08:04

## 2023-04-21 RX ADMIN — DOCUSATE SODIUM 200 MG: 100 CAPSULE, LIQUID FILLED ORAL at 09:04

## 2023-04-21 NOTE — Clinical Note
Diagnosis: Preeclampsia, severe [290154]   Future Attending Provider: LILLIE CASILLAS [[id number]]   Admitting Provider:: GILSON KAYE [[id number]]   Special Needs:: No Special Needs [1]

## 2023-04-21 NOTE — LACTATION NOTE
"Visited patient in room, holding baby in arms.  States baby was "screaming" during the night and her nipples were too sore to continue to breastfeed.  States her feeding plan is to pump and bottle feed EBM to baby. States she purchased a personal use pump but plans to contact her insurance company to obtain a Spectra pump.  Plan of care:  mother will double pump breasts "8 or more in 24" for 20 min using the most suction that is comfortable; will store, label, and use EBM as instructed; will care for double collection kit as instructed; will increase calories, fluids, and rest; will call [facility] prn.  "

## 2023-04-22 PROBLEM — E66.01 CLASS 3 SEVERE OBESITY WITH BODY MASS INDEX (BMI) OF 45.0 TO 49.9 IN ADULT: Status: ACTIVE | Noted: 2023-04-22

## 2023-04-22 PROBLEM — E66.813 CLASS 3 SEVERE OBESITY WITH BODY MASS INDEX (BMI) OF 45.0 TO 49.9 IN ADULT: Status: ACTIVE | Noted: 2023-04-22

## 2023-04-22 LAB
BNP SERPL-MCNC: 271 PG/ML (ref 0–99)
TROPONIN I SERPL DL<=0.01 NG/ML-MCNC: 0.01 NG/ML (ref 0–0.03)

## 2023-04-22 PROCEDURE — 25000003 PHARM REV CODE 250: Performed by: OBSTETRICS & GYNECOLOGY

## 2023-04-22 PROCEDURE — 96366 THER/PROPH/DIAG IV INF ADDON: CPT

## 2023-04-22 PROCEDURE — 93010 EKG 12-LEAD: ICD-10-PCS | Mod: ,,, | Performed by: INTERNAL MEDICINE

## 2023-04-22 PROCEDURE — G0378 HOSPITAL OBSERVATION PER HR: HCPCS

## 2023-04-22 PROCEDURE — 63600175 PHARM REV CODE 636 W HCPCS: Performed by: STUDENT IN AN ORGANIZED HEALTH CARE EDUCATION/TRAINING PROGRAM

## 2023-04-22 PROCEDURE — 83880 ASSAY OF NATRIURETIC PEPTIDE: CPT | Performed by: OBSTETRICS & GYNECOLOGY

## 2023-04-22 PROCEDURE — 93010 ELECTROCARDIOGRAM REPORT: CPT | Mod: ,,, | Performed by: INTERNAL MEDICINE

## 2023-04-22 PROCEDURE — 36415 COLL VENOUS BLD VENIPUNCTURE: CPT | Performed by: OBSTETRICS & GYNECOLOGY

## 2023-04-22 PROCEDURE — 84484 ASSAY OF TROPONIN QUANT: CPT | Performed by: OBSTETRICS & GYNECOLOGY

## 2023-04-22 PROCEDURE — 93005 ELECTROCARDIOGRAM TRACING: CPT

## 2023-04-22 PROCEDURE — 25000003 PHARM REV CODE 250: Performed by: STUDENT IN AN ORGANIZED HEALTH CARE EDUCATION/TRAINING PROGRAM

## 2023-04-22 PROCEDURE — 63600175 PHARM REV CODE 636 W HCPCS: Performed by: OBSTETRICS & GYNECOLOGY

## 2023-04-22 PROCEDURE — 96372 THER/PROPH/DIAG INJ SC/IM: CPT | Performed by: OBSTETRICS & GYNECOLOGY

## 2023-04-22 RX ORDER — CITALOPRAM 10 MG/1
20 TABLET ORAL DAILY
Status: DISCONTINUED | OUTPATIENT
Start: 2023-04-23 | End: 2023-04-23 | Stop reason: HOSPADM

## 2023-04-22 RX ORDER — ALPRAZOLAM 0.25 MG/1
0.25 TABLET ORAL ONCE
Status: COMPLETED | OUTPATIENT
Start: 2023-04-22 | End: 2023-04-22

## 2023-04-22 RX ORDER — FUROSEMIDE 20 MG/1
20 TABLET ORAL DAILY
Status: DISCONTINUED | OUTPATIENT
Start: 2023-04-23 | End: 2023-04-23 | Stop reason: HOSPADM

## 2023-04-22 RX ORDER — ENOXAPARIN SODIUM 100 MG/ML
40 INJECTION SUBCUTANEOUS EVERY 12 HOURS
Status: DISCONTINUED | OUTPATIENT
Start: 2023-04-22 | End: 2023-04-23 | Stop reason: HOSPADM

## 2023-04-22 RX ORDER — CITALOPRAM 10 MG/1
10 TABLET ORAL ONCE
Status: COMPLETED | OUTPATIENT
Start: 2023-04-22 | End: 2023-04-22

## 2023-04-22 RX ADMIN — IBUPROFEN 600 MG: 600 TABLET, FILM COATED ORAL at 08:04

## 2023-04-22 RX ADMIN — IBUPROFEN 600 MG: 600 TABLET, FILM COATED ORAL at 03:04

## 2023-04-22 RX ADMIN — SODIUM CHLORIDE, POTASSIUM CHLORIDE, SODIUM LACTATE AND CALCIUM CHLORIDE: 600; 310; 30; 20 INJECTION, SOLUTION INTRAVENOUS at 09:04

## 2023-04-22 RX ADMIN — CITALOPRAM HYDROBROMIDE 10 MG: 10 TABLET ORAL at 04:04

## 2023-04-22 RX ADMIN — ENOXAPARIN SODIUM 40 MG: 40 INJECTION SUBCUTANEOUS at 08:04

## 2023-04-22 RX ADMIN — DOCUSATE SODIUM 200 MG: 100 CAPSULE, LIQUID FILLED ORAL at 08:04

## 2023-04-22 RX ADMIN — ALPRAZOLAM 0.25 MG: 0.25 TABLET ORAL at 04:04

## 2023-04-22 RX ADMIN — IBUPROFEN 600 MG: 600 TABLET, FILM COATED ORAL at 12:04

## 2023-04-22 RX ADMIN — CITALOPRAM HYDROBROMIDE 10 MG: 10 TABLET ORAL at 08:04

## 2023-04-22 RX ADMIN — IBUPROFEN 600 MG: 600 TABLET, FILM COATED ORAL at 09:04

## 2023-04-22 RX ADMIN — MAGNESIUM SULFATE IN WATER 2 G/HR: 40 INJECTION, SOLUTION INTRAVENOUS at 12:04

## 2023-04-22 RX ADMIN — PRENATAL VIT W/ FE FUMARATE-FA TAB 27-0.8 MG 1 TABLET: 27-0.8 TAB at 08:04

## 2023-04-22 RX ADMIN — NIFEDIPINE 30 MG: 30 TABLET, FILM COATED, EXTENDED RELEASE ORAL at 08:04

## 2023-04-22 NOTE — CARE UPDATE
Called to bedside due to anxiety.  Patient is concerned that anxiety is leading to elevated BP.  Will increase celexa to 20mg po qd and give xanax 0.25mg po once.  Encouraged patient to see counselor PP.  Patient with BMI 43 will start lovenox BID for obesity with severe pre-E.  Patient also reports having chest tightness.  Will get stat EKG, troponin and BNP. BP in normal range.  Will continue to monitor.    , troponin normal and EKG NSR.  BNP can be elevated with severe pre-E and not over 400.  Will continue to monitor.

## 2023-04-22 NOTE — PLAN OF CARE
POC reviewed with patient, verbalized understanding. Fundus firm and bleeding scant. Pain well controlled with PO medications. Ambulating/voiding. MAG infusion continued.

## 2023-04-22 NOTE — TELEPHONE ENCOUNTER
Pt calling in, delivered baby 2 days ago. Discharged from hospital today. Now she is having abdominal pain that started 2-3 hours ago. Rates pain 8/10. Dispo is ED now. Pt verbalized understanding and will go. Advised to call back with further concerns.    Reason for Disposition   [1] SEVERE abdominal pain AND [2] lasts > 1 hour    Additional Information   Negative: Passed out (i.e., lost consciousness, collapsed and was not responding)   Negative: Shock suspected (e.g., cold/pale/clammy skin, too weak to stand, low BP, rapid pulse)   Negative: Difficult to awaken or acting confused (e.g., disoriented, slurred speech)   Negative: Sounds like a life-threatening emergency to the triager    Protocols used: Postpartum - Abdominal Pain-A-AH

## 2023-04-22 NOTE — ED PROVIDER NOTES
Encounter Date: 2023       History     Chief Complaint   Patient presents with    Abdominal Pain     HPI    Carie Estrada is a 33 yo  PPD2 from [facility]. Pregnancy complicated by gHTN and anxiety/depression. She did not meet criteria for a blood pressure medication on discharged earlier today. She presents today for abdominal pain and headache. While admitted postpartum, she never took any PRN narcotics, she received ibuprofen at 10 am and took ibuprofen at 5 pm at home. She has not taken any tylenol or other medication for headache or abdominal pain. Her abdominal pain is epigastric/     She is tearful and overwhelmed and feels off.       Review of patient's allergies indicates:  No Known Allergies  Past Medical History:   Diagnosis Date    Abnormal Pap smear 1/3/2013    Allergy     Anxiety     Panic attacks      No past surgical history on file.  Family History   Problem Relation Age of Onset    Hypertension Father     Diabetes Father     Heart attacks under age 50 Father     Hypertension Maternal Grandmother     Hypertension Paternal Grandmother     Hypertension Paternal Grandfather     Diabetes Paternal Grandfather     Heart attack Paternal Grandfather     Breast cancer Neg Hx     Ovarian cancer Neg Hx     Melanoma Neg Hx      Social History     Tobacco Use    Smoking status: Never    Smokeless tobacco: Never   Substance Use Topics    Alcohol use: Yes     Comment: socially    Drug use: No     Review of Systems   Constitutional:  Positive for fatigue. Negative for diaphoresis and fever.   Eyes:  Negative for visual disturbance.   Respiratory:  Negative for cough and shortness of breath.    Cardiovascular:  Positive for leg swelling. Negative for chest pain.   Gastrointestinal:  Positive for abdominal pain.   Genitourinary:  Negative for pelvic pain and vaginal pain.   Neurological:  Positive for headaches.   Psychiatric/Behavioral:  Positive for sleep disturbance. Negative for self-injury. The  patient is nervous/anxious.      Physical Exam     Initial Vitals   BP Pulse Resp Temp SpO2   04/21/23 2020 04/21/23 2019 04/21/23 2019 04/21/23 2020 04/21/23 2019   (!) 157/103 (!) 58 17 98.2 °F (36.8 °C) 97 %      MAP       --                Physical Exam    Vitals reviewed.  Constitutional: Vital signs are normal. She appears well-developed and well-nourished. Breathing: Normal.   HENT:   Head: Normocephalic and atraumatic.   Nose: Nose normal.   Eyes: Conjunctivae and EOM are normal.   Neck:   Normal range of motion.  Cardiovascular:  Normal rate, intact distal pulses and normal pulses.           Pulmonary/Chest: No respiratory distress. She exhibits no tenderness.   Normal work of breathing   Abdominal: Abdomen is soft. She exhibits no distension. There is no abdominal tenderness.   Musculoskeletal:         General: Edema present. Normal range of motion.      Cervical back: Normal range of motion.     Neurological: She is alert and oriented to person, place, and time. She has normal strength.   Skin: Skin is warm and dry.   Psychiatric: Her behavior is normal. Judgment normal.       ED Course   Procedures  Labs Reviewed   COMPREHENSIVE METABOLIC PANEL - Abnormal; Notable for the following components:       Result Value    CO2 21 (*)     Total Protein 5.9 (*)     Albumin 2.9 (*)     All other components within normal limits   CBC W/ AUTO DIFFERENTIAL - Abnormal; Notable for the following components:    RBC 2.82 (*)     Hemoglobin 8.6 (*)     Hematocrit 26.0 (*)     Immature Granulocytes 1.3 (*)     Immature Grans (Abs) 0.09 (*)     All other components within normal limits   AMYLASE   LIPASE          Imaging Results    None          Medications   citalopram tablet 10 mg (has no administration in time range)   diphenoxylate-atropine 2.5-0.025 mg per tablet 1 tablet (has no administration in time range)   ondansetron disintegrating tablet 8 mg (has no administration in time range)   prochlorperazine injection Soln  5 mg (has no administration in time range)   calcium carbonate 200 mg calcium (500 mg) chewable tablet 500 mg (has no administration in time range)   LIDOcaine HCL 10 mg/ml (1%) injection 10 mL (has no administration in time range)   lanolin cream (has no administration in time range)   benzocaine-lanolin (DERMOPLAST) topical spray (has no administration in time range)   hydrocortisone 2.5 % rectal cream (has no administration in time range)   docusate sodium capsule 200 mg (has no administration in time range)   simethicone chewable tablet 80 mg (has no administration in time range)   diphenhydrAMINE capsule 25 mg (has no administration in time range)   diphenhydrAMINE injection 25 mg (has no administration in time range)   prenatal vitamin oral tablet (has no administration in time range)   acetaminophen tablet 650 mg (has no administration in time range)   ibuprofen tablet 600 mg (has no administration in time range)   HYDROcodone-acetaminophen 5-325 mg per tablet 1 tablet (has no administration in time range)   HYDROcodone-acetaminophen  mg per tablet 1 tablet (has no administration in time range)   magnesium sulfate in water 40 gram/1,000 mL (4 %) bolus from bag 4 g 100 mL (4 g Intravenous Bolus from Bag 4/21/23 2110)     And   lactated ringers infusion ( Intravenous New Bag 4/21/23 2109)   magnesium sulfate in water 40 gram/1,000 mL (4 %) infusion (2 g/hr Intravenous Back Association 4/21/23 2130)   calcium gluconate 100 mg/mL (10%) injection 1 g (has no administration in time range)   NIFEdipine 24 hr tablet 30 mg (30 mg Oral Given 4/21/23 2108)   diphenhydrAMINE injection 25 mg (25 mg Intravenous Given 4/21/23 2059)   metoclopramide HCl injection 10 mg (10 mg Intravenous Given 4/21/23 2059)   acetaminophen tablet 1,000 mg (1,000 mg Oral Given 4/21/23 2058)   NIFEdipine capsule 10 mg (10 mg Oral Given 4/21/23 2108)     Medical Decision Making:   ED Management:  Severe range blood pressures in the JOHN,  diagnosed with preeclampsia with SF  -HA: given reglan/benadryl  -Abdominal pain: tylenol 1000 mg  -CBC, CMP, Lipase, Amylase: normal  -Procardia 10 IR, Procardia 30 mg ER given. Blood pressure responded  -Magnesium sulfate IV started for seizure prophylaxis  Patient admitted for postpartum preeclampsia w SF (BP)  Other:   I have discussed this case with another health care provider.          Attending Attestation:   Physician Attestation Statement for Resident:  As the supervising MD   Physician Attestation Statement: I have personally seen and examined this patient.   I agree with the above history.  -:   As the supervising MD I agree with the above PE.     As the supervising MD I agree with the above treatment, course, plan, and disposition.   I was personally present during the critical portions of the procedure(s) performed by the resident and was immediately available in the ED to provide services and assistance as needed during the entire procedure.  I have reviewed and agree with the residents interpretation of the following: lab data.             Attending ED Notes:   I have personally seen and examined the patient. I agree with the resident's note . Questions welcomed and answered to patient satisfaction.      PPD #3 presenting for postpartum preeclampsia with severe features  Received 10mg Procardia with good response  Mag started  Start procardia 30mg XL.   Restart home meds: citalopram  Pain: ibuprofen, tylenol    Admit for mag sulfate administration    Deneen To MD  2023 11:20 PM                    Clinical Impression:   Final diagnoses:  [O14.10] Preeclampsia, severe (Primary)        ED Disposition Condition    Observation Stable                Laquita Dunbar MD  Resident  [id number]       Deneen To MD  [id number]

## 2023-04-22 NOTE — PROGRESS NOTES
POSTPARTUM PROGRESS NOTE    Subjective:     PPD/POD#: 3  Readmit for severe Pre-E due to BP   Procedure:    EGA: 38w4d   N/V: No   F/C: No   Abd Pain: Mild, well-controlled with oral pain medication   Lochia: Mild   Voiding: Yes   Ambulating: Yes   Bowel fnc: Yes   Breastfeeding: No   Contraception: Per primary OB         Objective:      Temp:  [97.6 °F (36.4 °C)-98.2 °F (36.8 °C)] 98.2 °F (36.8 °C)  Pulse:  [] 97  Resp:  [16-18] 17  SpO2:  [93 %-97 %] 97 %  BP: (110-185)/() 136/85    Lung: Normal respiratory effort   Abdomen: Soft, appropriately tender   Uterus: Firm, no fundal tenderness   Incision: N/A   : Deferred   Extremities: Bilateral trace edema     Lab Review    Recent Labs   Lab 23  0041 23    139   K 3.6 3.8    109   CO2 18* 21*   BUN 9 7   CREATININE 0.7 0.7   GLU 93 80   PROT 6.4 5.9*   BILITOT 0.3 0.3   ALKPHOS 156* 118   ALT 20 38   AST 21 33       Recent Labs   Lab 23  0041 23  0502 23   WBC 8.27 11.29 7.11   HGB 10.7* 9.0* 8.6*   HCT 31.3* 27.6* 26.0*   MCV 91 93 92    206 233         I/O    Intake/Output Summary (Last 24 hours) at 2023 1044  Last data filed at 2023 1009  Gross per 24 hour   Intake 1239.85 ml   Output 7700 ml   Net -6460.15 ml        Assessment and Plan:       PreE w/SF  - BP as above  - asymptomatic  - preE labs as above  - UOP: 1.9 cc/kg/hr  - Mag: continue  - Hypertensive agent Procardia XL 30 mg po qd, will add lasix 20mg po x 5d tomorrow    Mood Disorder  - Mood stable  - Medications: Celexa  - Will need 1-2 week postpartum mood check     Obesity  - PrePreg BMI 45  - Encourage ambulation  - Lovenox: 40 mg BID       Yesenia Chaudhari MD,MAYTE  Date and Time: 2023 10:44 AM  OB Hospitalist

## 2023-04-22 NOTE — H&P
[facility] Emergency Dept (Obstetrics)  Obstetrics & Gynecology  History & Physical    Patient Name: Carie Estrada  MRN: [id number]  Admission Date: 2023  Primary Care Provider: Rose Cordova MD    Subjective:   History of Present Illness: Carie Estrada is a 33 yo  PPD2 from [facility]. Pregnancy complicated by gHTN and anxiety/depression. She did not meet criteria for a blood pressure medication on discharged earlier today. She presents today for abdominal pain and headache. While admitted postpartum, she never took any PRN narcotics, she received ibuprofen at 10 am and took ibuprofen at 5 pm at home. She has not taken any tylenol or other medication for headache or abdominal pain. Her abdominal pain is epigastric/     Current Facility-Administered Medications on File Prior to Encounter   Medication    [DISCONTINUED] 0.9%  NaCl infusion (for blood administration)    [DISCONTINUED] 0.9%  NaCl infusion    [DISCONTINUED] acetaminophen tablet 650 mg    [DISCONTINUED] benzocaine-lanolin (DERMOPLAST) topical spray    [DISCONTINUED] calcium carbonate 200 mg calcium (500 mg) chewable tablet 500 mg    [DISCONTINUED] carboprost injection 250 mcg    [DISCONTINUED] carboprost injection 250 mcg    [DISCONTINUED] citalopram tablet 10 mg    [DISCONTINUED] diphenhydrAMINE capsule 25 mg    [DISCONTINUED] diphenhydrAMINE injection 25 mg    [DISCONTINUED] diphenoxylate-atropine 2.5-0.025 mg per tablet 1 tablet    [DISCONTINUED] docusate sodium capsule 200 mg    [DISCONTINUED] HYDROcodone-acetaminophen  mg per tablet 1 tablet    [DISCONTINUED] HYDROcodone-acetaminophen 5-325 mg per tablet 1 tablet    [DISCONTINUED] hydrocortisone 2.5 % rectal cream    [DISCONTINUED] ibuprofen tablet 600 mg    [DISCONTINUED] lactated ringers bolus 1,000 mL    [DISCONTINUED] lactated ringers bolus 500 mL    [DISCONTINUED] lactated ringers infusion    [DISCONTINUED] lanolin cream    [DISCONTINUED] LIDOcaine HCL 10 mg/ml  (1%) injection 10 mL    [DISCONTINUED] measles, mumps and rubella vaccine 1,000-12,500 TCID50/0.5 mL injection 0.5 mL    [DISCONTINUED] methylergonovine injection 200 mcg    [DISCONTINUED] methylergonovine injection 200 mcg    [DISCONTINUED] miSOPROStoL tablet 800 mcg    [DISCONTINUED] miSOPROStoL tablet 800 mcg    [DISCONTINUED] ondansetron disintegrating tablet 8 mg    [DISCONTINUED] oxytocin 30 units in 500 mL lactated ringers infusion (non-titrating)    [DISCONTINUED] oxytocin 30 units in 500 mL lactated ringers infusion (non-titrating)    [DISCONTINUED] oxytocin 30 units in 500 mL lactated ringers infusion (non-titrating)    [DISCONTINUED] oxytocin 30 units in 500 mL lactated ringers infusion (non-titrating)    [DISCONTINUED] oxytocin 30 units in 500 mL lactated ringers infusion (non-titrating)    [DISCONTINUED] oxytocin 30 units in 500 mL lactated ringers infusion (titrating)    [DISCONTINUED] oxytocin injection 10 Units    [DISCONTINUED] prenatal vitamin oral tablet    [DISCONTINUED] prochlorperazine injection Soln 5 mg    [DISCONTINUED] simethicone chewable tablet 80 mg    [DISCONTINUED] Tdap (BOOSTRIX) vaccine injection 0.5 mL    [DISCONTINUED] tranexamic acid in NaCl,iso-os IVPB 1,000 mg    [DISCONTINUED] tranexamic acid in NaCl,iso-os IVPB 1,000 mg     Current Outpatient Medications on File Prior to Encounter   Medication Sig    citalopram (CELEXA) 10 MG tablet Take 1 tablet (10 mg total) by mouth once daily.    docusate sodium (COLACE) 100 MG capsule Take 1 capsule (100 mg total) by mouth 2 (two) times daily as needed for Constipation.    ferrous sulfate (IRON) 325 mg (65 mg iron) Tab tablet Take 1 tablet (325 mg total) by mouth daily with breakfast.    ibuprofen (ADVIL,MOTRIN) 600 MG tablet Take 1 tablet (600 mg total) by mouth every 6 (six) hours.    prenatal 25/iron fum/folic/dha (PRENATAL-1 ORAL) Take by mouth.       Review of patient's allergies indicates:  No Known Allergies    Past Medical  History:   Diagnosis Date    Abnormal Pap smear 1/3/2013    Allergy     Anxiety     Panic attacks      OB History    Para Term  AB Living   1 1 1     1   SAB IAB Ectopic Multiple Live Births         0 1      # Outcome Date GA Lbr Jasbir/2nd Weight Sex Delivery Anes PTL Lv   1 Term 23 38w4d / 00:21 2.81 kg (6 lb 3.1 oz) F Vag-Spont EPI N LUIS     No past surgical history on file.  Family History       Problem Relation (Age of Onset)    Diabetes Father, Paternal Grandfather    Heart attack Paternal Grandfather    Heart attacks under age 50 Father    Hypertension Father, Maternal Grandmother, Paternal Grandmother, Paternal Grandfather          Tobacco Use    Smoking status: Never    Smokeless tobacco: Never   Substance and Sexual Activity    Alcohol use: Yes     Comment: socially    Drug use: No    Sexual activity: Yes     Partners: Male     Birth control/protection: OCP     Review of Systems   Constitutional:  Positive for fatigue. Negative for chills, diaphoresis and fever.   Eyes:  Negative for visual disturbance.   Respiratory:  Negative for cough and shortness of breath.    Cardiovascular:  Positive for leg swelling. Negative for chest pain.   Gastrointestinal:  Positive for abdominal pain (epigastric) and bloating. Negative for diarrhea, nausea and vomiting.   Genitourinary:  Negative for pelvic pain and vaginal pain.   Musculoskeletal:  Negative for leg pain.   Neurological:  Positive for headaches.   Psychiatric/Behavioral:  Positive for depression and sleep disturbance. The patient is nervous/anxious.    Objective:     Vital Signs (Most Recent):  Temp: 98.2 °F (36.8 °C) (23)  Pulse: 105 (23)  Resp: 16 (23)  BP: 111/68 (23)  SpO2: 95 % (23)   Vital Signs (24h Range):  Temp:  [97.6 °F (36.4 °C)-98.3 °F (36.8 °C)] 98.2 °F (36.8 °C)  Pulse:  [] 105  Resp:  [16-18] 16  SpO2:  [94 %-97 %] 95 %  BP: (110-185)/() 111/68        There is  no height or weight on file to calculate BMI.  No LMP recorded (lmp unknown).    Physical Exam:   Constitutional: She is oriented to person, place, and time. She appears well-developed and well-nourished. No distress.    HENT:   Head: Normocephalic and atraumatic.    Eyes: Pupils are equal, round, and reactive to light. EOM are normal.     Cardiovascular:  Normal rate.             Pulmonary/Chest: Effort normal. No respiratory distress.        Abdominal: Soft. She exhibits no distension. There is no abdominal tenderness. There is no rebound and no guarding.             Musculoskeletal: Normal range of motion and moves all extremeties. Edema present. No tenderness.       Neurological: She is alert and oriented to person, place, and time.    Skin: Skin is warm and dry. She is not diaphoretic.      Laboratory:  CBC:   Recent Labs   Lab 04/21/23 2041   WBC 7.11   RBC 2.82*   HGB 8.6*   HCT 26.0*      MCV 92   MCH 30.5   MCHC 33.1     CMP:   Recent Labs   Lab 04/21/23 2041   GLU 80   CALCIUM 8.9   ALBUMIN 2.9*   PROT 5.9*      K 3.8   CO2 21*      BUN 7   CREATININE 0.7   ALKPHOS 118   ALT 38   AST 33   BILITOT 0.3     I have personallly reviewed all pertinent lab results from the last 24 hours.        Assessment/Plan:     Active Diagnoses:    Diagnosis Date Noted POA    PRINCIPAL PROBLEM:  Preeclampsia, severe [O14.10] 04/21/2023 Unknown      Problems Resolved During this Admission:       Postpartum Preeclampsia with SF (BP/ PPD#2)  -Headache and severe range blood pressure in JOHN  -Tylenol given for abdominal pain  -Reglan/benadryl given for headache  -CBC, CMP, Lipase, Amylase as above wnl  -Procardia 10 mg IR in JOHN at 9 pm, 30 mg ER at the same time  -BP responded. Will continue procardia 30 mg XL daily   -Magnesium sulfate IV started for 24 hours for seizure prophylaxis    Chucky Dunbar MD  OBGYN PGY-4

## 2023-04-23 VITALS
WEIGHT: 253.19 LBS | HEART RATE: 80 BPM | OXYGEN SATURATION: 98 % | SYSTOLIC BLOOD PRESSURE: 134 MMHG | DIASTOLIC BLOOD PRESSURE: 94 MMHG | TEMPERATURE: 98 F | RESPIRATION RATE: 18 BRPM | BODY MASS INDEX: 42.13 KG/M2

## 2023-04-23 PROCEDURE — 25000003 PHARM REV CODE 250: Performed by: OBSTETRICS & GYNECOLOGY

## 2023-04-23 PROCEDURE — 25000003 PHARM REV CODE 250: Performed by: STUDENT IN AN ORGANIZED HEALTH CARE EDUCATION/TRAINING PROGRAM

## 2023-04-23 PROCEDURE — G0378 HOSPITAL OBSERVATION PER HR: HCPCS

## 2023-04-23 PROCEDURE — 63600175 PHARM REV CODE 636 W HCPCS: Performed by: OBSTETRICS & GYNECOLOGY

## 2023-04-23 PROCEDURE — 96372 THER/PROPH/DIAG INJ SC/IM: CPT | Performed by: OBSTETRICS & GYNECOLOGY

## 2023-04-23 RX ORDER — NIFEDIPINE 30 MG/1
30 TABLET, EXTENDED RELEASE ORAL DAILY
Qty: 30 TABLET | Refills: 11 | Status: SHIPPED | OUTPATIENT
Start: 2023-04-24 | End: 2023-04-26

## 2023-04-23 RX ORDER — CITALOPRAM 20 MG/1
20 TABLET, FILM COATED ORAL DAILY
Qty: 30 TABLET | Refills: 11 | Status: SHIPPED | OUTPATIENT
Start: 2023-04-24 | End: 2024-04-23

## 2023-04-23 RX ADMIN — CITALOPRAM HYDROBROMIDE 20 MG: 10 TABLET ORAL at 08:04

## 2023-04-23 RX ADMIN — NIFEDIPINE 30 MG: 30 TABLET, FILM COATED, EXTENDED RELEASE ORAL at 08:04

## 2023-04-23 RX ADMIN — IBUPROFEN 600 MG: 600 TABLET, FILM COATED ORAL at 04:04

## 2023-04-23 RX ADMIN — ACETAMINOPHEN 650 MG: 325 TABLET, FILM COATED ORAL at 09:04

## 2023-04-23 RX ADMIN — ENOXAPARIN SODIUM 40 MG: 40 INJECTION SUBCUTANEOUS at 08:04

## 2023-04-23 RX ADMIN — PRENATAL VIT W/ FE FUMARATE-FA TAB 27-0.8 MG 1 TABLET: 27-0.8 TAB at 08:04

## 2023-04-23 RX ADMIN — FUROSEMIDE 20 MG: 20 TABLET ORAL at 08:04

## 2023-04-23 NOTE — NURSING
Pt being discharged, verbalizes understanding. VSS. NAD noted. No complaints of pain. All questions nd concerns addressed. Awaiting transportation, will continue to monitor.

## 2023-04-23 NOTE — PROGRESS NOTES
POSTPARTUM PROGRESS NOTE    Subjective:     PPD/POD#: 4  Readmit for severe Pre-E due to BP   Procedure:    EGA: 38w4d   N/V: No   F/C: No   Abd Pain: Mild, well-controlled with oral pain medication   Lochia: Mild   Voiding: Yes   Ambulating: Yes   Bowel fnc: Yes   Breastfeeding: No   Contraception: Per primary OB    Reports headache, will try tylenol.      Objective:      Temp:  [98.1 °F (36.7 °C)-98.9 °F (37.2 °C)] 98.2 °F (36.8 °C)  Pulse:  [] 80  Resp:  [18] 18  SpO2:  [94 %-98 %] 98 %  BP: (123-148)/(73-96) 134/94  Patient Vitals for the past 24 hrs:   BP Temp Temp src Pulse Resp SpO2   23 1120 (!) 134/94 98.2 °F (36.8 °C) Oral 80 18 98 %   23 0916 128/82 98.5 °F (36.9 °C) -- 88 18 98 %   23 0817 135/89 -- -- -- -- --   23 0706 135/89 98.7 °F (37.1 °C) Oral 80 18 98 %   23 0434 123/75 98.9 °F (37.2 °C) Oral 73 18 97 %   23 0000 131/73 98.2 °F (36.8 °C) Oral 70 18 98 %   23 2200 (!) 148/93 -- -- 80 -- 95 %   23 2100 (!) 139/91 -- -- 83 -- 95 %   23 2000 (!) 145/94 98.1 °F (36.7 °C) Oral 98 18 96 %   23 1900 (!) 145/96 -- -- 91 -- 97 %   23 1800 (!) 144/90 -- -- 81 -- (!) 94 %   23 1700 (!) 137/93 -- -- 92 -- 96 %   23 1600 135/88 -- -- 92 -- 96 %   23 1500 134/84 -- -- 100 -- 97 %   23 1400 (!) 143/95 -- -- 99 -- 97 %   23 1300 (!) 142/88 -- -- 92 -- 96 %   23 1200 (!) 135/91 -- -- 91 -- 96 %       Lung: Normal respiratory effort   Abdomen: Soft, appropriately tender   Uterus: Firm, no fundal tenderness   Incision: N/A   : Deferred   Extremities: No edema     Lab Review    Recent Labs   Lab 23  0041 23  204    139   K 3.6 3.8    109   CO2 18* 21*   BUN 9 7   CREATININE 0.7 0.7   GLU 93 80   PROT 6.4 5.9*   BILITOT 0.3 0.3   ALKPHOS 156* 118   ALT 20 38   AST 21 33       Recent Labs   Lab 23  0041 23  0502 23  2041   WBC 8.27 11.29 7.11   HGB 10.7* 9.0* 8.6*    HCT 31.3* 27.6* 26.0*   MCV 91 93 92    206 233         I/O    Intake/Output Summary (Last 24 hours) at 4/23/2023 1129  Last data filed at 4/22/2023 2035  Gross per 24 hour   Intake --   Output 2850 ml   Net -2850 ml        Assessment and Plan:       PreE w/SF  - BP as above  - asymptomatic  - preE labs as above  - UOP: 1.9 cc/kg/hr  - Mag: continue  - Hypertensive agent Procardia XL 30 mg po qd, discontinue lasix as good control with single agent and no discernable edema.     Mood Disorder  - Mood stable  - Medications: Celexa, increased to 20mg   - Will need 1-2 week postpartum mood check. Referral to Dr. Yanes placed     Obesity  - PrePreg BMI 45  - Encourage ambulation  - Lovenox: 40 mg BID     Pt aware procardia can give headache as side effect. Will try tylenol for current headache, but if persistent, will change to ACE-I as she is not breastfeeding and has low baseline HR.     Deneen To MD  4/23/2023 11:33 AM

## 2023-04-23 NOTE — DISCHARGE SUMMARY
Postpartum Readmit Discharge Summary  Obstetrics      Primary OB Clinician: Rose Cordova, *      Admission date: 2023  Discharge date: 2023    Disposition: To home, self care    Discharge Diagnosis List:      Patient Active Problem List   Diagnosis    Adjustment disorder with mixed anxiety and depressed mood    Anxiety    Uterine fibroids affecting pregnancy, unspecified trimester    Back pain in pregnancy    Gestational hypertension, third trimester     (spontaneous vaginal delivery)    Preeclampsia, severe    Class 3 severe obesity with body mass index (BMI) of 45.0 to 49.9 in adult       Procedure:  none    Hospital Course:  Carie Estrada is a 32 y.o. now , PPD #4 who was admitted on 2023 for Preeclampsia with Severe Features. She was admitted to the postpartum unit for administration of magnesium sulfate. Her inpatient stay was complicated by anxiety and intermittent mild chest pain, which was evaluated with labs and EKG. She was started on the following medications: procardia 30mg XL, and her citalopram was increased to 20mg daily. On discharge day, patient's pain is controlled with oral pain medications. Pt is tolerating ambulation without SOB or CP, and regular diet without N/V. Reports lochia is mild. Denies any HA, vision changes, F/C, LE swelling. Denies any breast pain/soreness.    Pt in stable condition and ready for discharge. She has been instructed to start and/or continue medications and follow up with her obstetrics provider as listed below.    Pertinent studies:  CBC  Recent Labs   Lab 23  0041 23  0502 23  2041   WBC 8.27 11.29 7.11   HGB 10.7* 9.0* 8.6*   HCT 31.3* 27.6* 26.0*   MCV 91 93 92    206 233          Immunization History   Administered Date(s) Administered    COVID-19, MRNA, LN-S, PF (MODERNA FULL 0.5 ML DOSE) 2021    DTaP 1991, 01/15/1992, 1992, 1993, 10/03/1995    HIB 1991,  01/15/1992, 04/08/1992, 12/30/1992    Hepatitis B, Pediatric/Adolescent 04/29/2000, 06/24/2000, 12/08/2000    IPV 1991, 01/15/1992, 04/08/1992, 09/21/1993, 10/03/1995    Influenza - Quadrivalent - [facility] - PF 10/18/2022    MMR 12/30/1992, 10/03/1995    Td (ADULT) 09/20/2003    Tdap 02/14/2023    Patient Instructions:   Current Discharge Medication List        START taking these medications    Details   NIFEdipine (PROCARDIA-XL) 30 MG (OSM) 24 hr tablet Take 1 tablet (30 mg total) by mouth once daily.  Qty: 30 tablet, Refills: 11    Comments: .           CONTINUE these medications which have CHANGED    Details   citalopram (CELEXA) 20 MG tablet Take 1 tablet (20 mg total) by mouth once daily.  Qty: 30 tablet, Refills: 11           CONTINUE these medications which have NOT CHANGED    Details   docusate sodium (COLACE) 100 MG capsule Take 1 capsule (100 mg total) by mouth 2 (two) times daily as needed for Constipation.  Qty: 60 capsule, Refills: 0      ferrous sulfate (IRON) 325 mg (65 mg iron) Tab tablet Take 1 tablet (325 mg total) by mouth daily with breakfast.  Qty: 30 tablet, Refills: 1      ibuprofen (ADVIL,MOTRIN) 600 MG tablet Take 1 tablet (600 mg total) by mouth every 6 (six) hours.  Qty: 60 tablet, Refills: 1      prenatal 25/iron fum/folic/dha (PRENATAL-1 ORAL) Take by mouth.             Discharge Procedure Orders   Ambulatory referral/consult to Psychiatry   Standing Status: Future   Referral Priority: Routine Referral Type: Psychiatric   Referral Reason: Specialty Services Required   Referred to Provider: SCOTTY SHEPARD Requested Specialty: Psychiatry   Number of Visits Requested: 1     Diet Adult Regular     No driving until:   Order Comments: No driving until 24 hours since last narcotic/opioid     Pelvic Rest     Notify your health care provider if you experience any of the following:  temperature >100.4     Notify your health care provider if you experience any of the following:  persistent  nausea and vomiting or diarrhea     Notify your health care provider if you experience any of the following:  severe uncontrolled pain     Notify your health care provider if you experience any of the following:  redness, tenderness, or signs of infection (pain, swelling, redness, odor or green/yellow discharge around incision site)     Notify your health care provider if you experience any of the following:  severe persistent headache     Notify your health care provider if you experience any of the following:   Order Comments: Heavy bleeding saturating >1 pad per hour for 2 hours     Activity as tolerated        Follow-up Information       Rose Cordova MD. Scheduled for Tuesday 4/23/2023.     Specialty: Obstetrics and Gynecology  Why: for blood pressure check  Contact information:  [address] [address]  [phone]                              Deneen To MD  4/23/2023 12:16 PM

## 2023-04-23 NOTE — PLAN OF CARE
Problem: Adult Inpatient Plan of Care  Goal: Plan of Care Review  Outcome: Ongoing, Progressing  Goal: Optimal Comfort and Wellbeing  Outcome: Ongoing, Progressing     Problem: Hypertensive Disorders in Pregnancy  Goal: Maternal-Fetal Stabilization  Outcome: Ongoing, Progressing     VSS. Mag infusion taken down at 2100. No complaints at this time.

## 2023-04-24 NOTE — TELEPHONE ENCOUNTER
Pt reported abdominal pain after d/c.  Went back to [facility] and was admited for pre-e.  Sent home yesterday with BP medication.  Feeling better.  Has a f/u appt with Dr. Cordova tomorrow.    Advised to let us know if she needs anything before then.  Pt verbalized understanding.

## 2023-04-25 ENCOUNTER — HOSPITAL ENCOUNTER (OUTPATIENT)
Dept: RADIOLOGY | Facility: OTHER | Age: 33
Discharge: HOME | End: 2023-04-25
Attending: OBSTETRICS & GYNECOLOGY
Payer: COMMERCIAL

## 2023-04-25 ENCOUNTER — PATIENT MESSAGE (OUTPATIENT)
Dept: OBSTETRICS AND GYNECOLOGY | Facility: CLINIC | Age: 33
End: 2023-04-25

## 2023-04-25 ENCOUNTER — POSTPARTUM VISIT (OUTPATIENT)
Dept: OBSTETRICS AND GYNECOLOGY | Facility: CLINIC | Age: 33
End: 2023-04-25
Payer: COMMERCIAL

## 2023-04-25 VITALS
WEIGHT: 244.69 LBS | DIASTOLIC BLOOD PRESSURE: 90 MMHG | BODY MASS INDEX: 40.77 KG/M2 | SYSTOLIC BLOOD PRESSURE: 138 MMHG | HEIGHT: 65 IN

## 2023-04-25 DIAGNOSIS — O14.10 SEVERE PRE-ECLAMPSIA, ANTEPARTUM: Primary | ICD-10-CM

## 2023-04-25 DIAGNOSIS — O14.10 SEVERE PRE-ECLAMPSIA, ANTEPARTUM: ICD-10-CM

## 2023-04-25 PROCEDURE — 99213 OFFICE O/P EST LOW 20 MIN: CPT | Mod: 24,S$GLB,, | Performed by: OBSTETRICS & GYNECOLOGY

## 2023-04-25 PROCEDURE — 99999 PR PBB SHADOW E&M-EST. PATIENT-LVL III: ICD-10-PCS | Mod: PBBFAC,,, | Performed by: OBSTETRICS & GYNECOLOGY

## 2023-04-25 PROCEDURE — 1111F PR DISCHARGE MEDS RECONCILED W/ CURRENT OUTPATIENT MED LIST: ICD-10-PCS | Mod: CPTII,S$GLB,, | Performed by: OBSTETRICS & GYNECOLOGY

## 2023-04-25 PROCEDURE — 71046 X-RAY EXAM CHEST 2 VIEWS: CPT | Mod: TC,FY

## 2023-04-25 PROCEDURE — 1111F DSCHRG MED/CURRENT MED MERGE: CPT | Mod: CPTII,S$GLB,, | Performed by: OBSTETRICS & GYNECOLOGY

## 2023-04-25 PROCEDURE — 99999 PR PBB SHADOW E&M-EST. PATIENT-LVL III: CPT | Mod: PBBFAC,,, | Performed by: OBSTETRICS & GYNECOLOGY

## 2023-04-25 PROCEDURE — 99213 PR OFFICE/OUTPT VISIT, EST, LEVL III, 20-29 MIN: ICD-10-PCS | Mod: 24,S$GLB,, | Performed by: OBSTETRICS & GYNECOLOGY

## 2023-04-25 PROCEDURE — 71046 X-RAY EXAM CHEST 2 VIEWS: CPT | Mod: 26,,, | Performed by: RADIOLOGY

## 2023-04-25 PROCEDURE — 71046 XR CHEST PA AND LATERAL: ICD-10-PCS | Mod: 26,,, | Performed by: RADIOLOGY

## 2023-04-25 NOTE — PROGRESS NOTES
"Subjective:     Carie Estrada is a 32 y.o.  who presents for a one week post-partum follow up after a vaginal delivery complicated by severe pre-e post partum. Discharged to home on Procardia XL 30 mg daily. Despite this, home readings have still been elevated. BP in clinic today slightly better than home readings. Today she reports HAs but denies vision changes. Formula Feeding. Reports baby is doing well. Scores 18 on the PPD scale but believes it's due to her fears about her BP. Denies SI/HI. She has been having sharp chest pains that last for about 20 minutes. Can happen when active or at rest. No SOB. Swelling is improving. No leg pain. EKG in ER WNL.      Review the Delivery Report for details.     Objective:     Vitals: BP (!) 138/90   Ht 5' 5" (1.651 m)   Wt 111 kg (244 lb 11.4 oz)   LMP  (LMP Unknown)   Breastfeeding No   BMI 40.72 kg/m²     General:  Alert and cooperative      Assessment:     1. S/p vaginal delivery 1 week ago.   2. Severe pre-e s/p magnesium treatment    Plan:     1. Increased Procardia to 60 mg daily  2. F/u in 3 days to re-check BPs and mood.   3. On Celexa which was increased after delivery.     "

## 2023-04-26 ENCOUNTER — PATIENT MESSAGE (OUTPATIENT)
Dept: OBSTETRICS AND GYNECOLOGY | Facility: CLINIC | Age: 33
End: 2023-04-26
Payer: COMMERCIAL

## 2023-04-26 RX ORDER — NIFEDIPINE 60 MG/1
60 TABLET, EXTENDED RELEASE ORAL DAILY
Qty: 30 TABLET | Refills: 11 | Status: SHIPPED | OUTPATIENT
Start: 2023-04-26 | End: 2024-04-25

## 2023-04-27 NOTE — TELEPHONE ENCOUNTER
Not to worry   This is not a harmful blood clot and not a clot that could go go lungs    Looks like a very bad bruise  Reassurance and supportive case- observation  Vit C could be helpful for healing :)

## 2023-04-28 ENCOUNTER — POSTPARTUM VISIT (OUTPATIENT)
Dept: OBSTETRICS AND GYNECOLOGY | Facility: CLINIC | Age: 33
End: 2023-04-28
Payer: COMMERCIAL

## 2023-04-28 VITALS
DIASTOLIC BLOOD PRESSURE: 86 MMHG | SYSTOLIC BLOOD PRESSURE: 124 MMHG | HEIGHT: 65 IN | WEIGHT: 254.94 LBS | BODY MASS INDEX: 42.48 KG/M2

## 2023-04-28 DIAGNOSIS — O14.10 SEVERE PRE-ECLAMPSIA, ANTEPARTUM: Primary | ICD-10-CM

## 2023-04-28 PROCEDURE — 99213 PR OFFICE/OUTPT VISIT, EST, LEVL III, 20-29 MIN: ICD-10-PCS | Mod: 24,S$GLB,, | Performed by: OBSTETRICS & GYNECOLOGY

## 2023-04-28 PROCEDURE — 99999 PR PBB SHADOW E&M-EST. PATIENT-LVL III: ICD-10-PCS | Mod: PBBFAC,,, | Performed by: OBSTETRICS & GYNECOLOGY

## 2023-04-28 PROCEDURE — 99999 PR PBB SHADOW E&M-EST. PATIENT-LVL III: CPT | Mod: PBBFAC,,, | Performed by: OBSTETRICS & GYNECOLOGY

## 2023-04-28 PROCEDURE — 99213 OFFICE O/P EST LOW 20 MIN: CPT | Mod: 24,S$GLB,, | Performed by: OBSTETRICS & GYNECOLOGY

## 2023-04-28 PROCEDURE — 1111F DSCHRG MED/CURRENT MED MERGE: CPT | Mod: CPTII,S$GLB,, | Performed by: OBSTETRICS & GYNECOLOGY

## 2023-04-28 PROCEDURE — 1111F PR DISCHARGE MEDS RECONCILED W/ CURRENT OUTPATIENT MED LIST: ICD-10-PCS | Mod: CPTII,S$GLB,, | Performed by: OBSTETRICS & GYNECOLOGY

## 2023-04-28 RX ORDER — MAGNESIUM 30 MG
TABLET ORAL ONCE
COMMUNITY
End: 2023-05-30

## 2023-04-28 NOTE — PROGRESS NOTES
"Subjective:     Carie Estrada is a 32 y.o.  who presents for a follow up BP check after increasing procardia to 60 mg daily, as well as f/u mood check. PPD score last visit 18. Has improved today to 14 and patient states that she feels much better. Today she reports that she is still having HAs but they are mild and responding to motrin. Continues to have episodes of CP - CXR 2 days ago WNL, EKG in ER WNL, discussed signs/sx of PE/DVT. Reports baby is doing well.Overall doing well. Checked home cuff today which is reading much higher than clinic values. Sent to the [facility] for recalibration.     Review the Delivery Report for details.     Objective:     Vitals: /86   Ht 5' 5" (1.651 m)   Wt 115.6 kg (254 lb 15.4 oz)   LMP  (LMP Unknown)   Breastfeeding No   BMI 42.43 kg/m²     General:  Alert and cooperative   Abdomen: Abdomen is soft, non distended, non-tender.    Incision: Clean, healing well. No erythema or drainage.        Assessment:     1. S/p vaginal delivery 1 week ago.   2. Severe pre-e s/p magnesium treatment  3. Depression - currently on max dose Celexa - improving    Plan:     1. Continue daily wound care.  2. Pelvic rest for 6 weeks postpartum.    3. Gradually resume normal activities.  4. Return to clinic in 5 weeks for final post partum follow up.    "

## 2023-05-01 ENCOUNTER — PATIENT MESSAGE (OUTPATIENT)
Dept: OBSTETRICS AND GYNECOLOGY | Facility: CLINIC | Age: 33
End: 2023-05-01
Payer: COMMERCIAL

## 2023-05-06 ENCOUNTER — PATIENT MESSAGE (OUTPATIENT)
Dept: OBSTETRICS AND GYNECOLOGY | Facility: CLINIC | Age: 33
End: 2023-05-06
Payer: COMMERCIAL

## 2023-05-21 ENCOUNTER — PATIENT MESSAGE (OUTPATIENT)
Dept: OBSTETRICS AND GYNECOLOGY | Facility: CLINIC | Age: 33
End: 2023-05-21
Payer: COMMERCIAL

## 2023-05-24 ENCOUNTER — TELEPHONE (OUTPATIENT)
Dept: OBSTETRICS AND GYNECOLOGY | Facility: CLINIC | Age: 33
End: 2023-05-24

## 2023-05-30 ENCOUNTER — POSTPARTUM VISIT (OUTPATIENT)
Dept: OBSTETRICS AND GYNECOLOGY | Facility: CLINIC | Age: 33
End: 2023-05-30
Payer: COMMERCIAL

## 2023-05-30 VITALS
HEIGHT: 65 IN | DIASTOLIC BLOOD PRESSURE: 80 MMHG | BODY MASS INDEX: 42.16 KG/M2 | WEIGHT: 253.06 LBS | SYSTOLIC BLOOD PRESSURE: 118 MMHG

## 2023-05-30 DIAGNOSIS — Z30.09 ENCOUNTER FOR OTHER GENERAL COUNSELING AND ADVICE ON CONTRACEPTION: ICD-10-CM

## 2023-05-30 DIAGNOSIS — Z87.59 HISTORY OF SEVERE PRE-ECLAMPSIA: ICD-10-CM

## 2023-05-30 PROCEDURE — 0503F PR POSTPARTUM CARE VISIT: ICD-10-PCS | Mod: CPTII,S$GLB,, | Performed by: OBSTETRICS & GYNECOLOGY

## 2023-05-30 PROCEDURE — 99999 PR PBB SHADOW E&M-EST. PATIENT-LVL III: ICD-10-PCS | Mod: PBBFAC,,, | Performed by: OBSTETRICS & GYNECOLOGY

## 2023-05-30 PROCEDURE — 99999 PR PBB SHADOW E&M-EST. PATIENT-LVL III: CPT | Mod: PBBFAC,,, | Performed by: OBSTETRICS & GYNECOLOGY

## 2023-05-30 PROCEDURE — 0503F POSTPARTUM CARE VISIT: CPT | Mod: CPTII,S$GLB,, | Performed by: OBSTETRICS & GYNECOLOGY

## 2023-05-30 RX ORDER — NORETHINDRONE ACETATE AND ETHINYL ESTRADIOL 1MG-20(21)
1 KIT ORAL DAILY
Qty: 30 TABLET | Refills: 11 | Status: SHIPPED | OUTPATIENT
Start: 2023-05-30 | End: 2024-02-14

## 2023-05-30 NOTE — PROGRESS NOTES
"  Subjective:      Carie Estrada is a 32 y.o.  who presents for a postpartum visit.  She is status post  uncomplicated vaginal delivery 6 weeks ago.  Her hospitalization was complicated by severe pre-e.  She is not breastfeeding.  She desires oral contraceptives (estrogen/progesterone) for contraception.  She denies signs and symptoms of postpartum depression and feels that mood is improving. This is reflected by her improving PP Depression score. She is currently on Procardia XL 60 mg and BP is in the normal range today.    Her last pap was NILM on 3/2/2020      Review the Delivery Report for details.     Objective:     /80   Ht 5' 5" (1.651 m)   Wt 114.8 kg (253 lb 1.4 oz)   LMP  (LMP Unknown)   Breastfeeding No   BMI 42.12 kg/m²     General: healthy, no distress  Abdomen: soft, benign  External Genitalia: normal, well-healed, without lesions or masses  Vagina: normal, well-healed, physiologic discharge, without lesions    Assessment:     Post partum depression  - Resolving    Postpartum care and examination    History of severe pre-eclampsia  - Decrease from 60 mg Procardia daily to 30 mg daily x 1 week, then stop all together.   - Will re-assess BP at annual.    Contraceptive Counseling  - Lo Loestrin Rx'd.     Plan:     1. Return to clinic in 2-3 months for annual exam    After discussing the risks, benefits, and alternatives, Carie Estrada has opted to begin contraceptive treatment with oral contraceptive pills.   Today's discussion included:  1. When to initiate pills.  2. The need for regular compliance to ensure adequate contraceptive effect.  3. Potential minor side effects such as breakthrough spotting, nausea, breast tenderness, weight changes, acne, headaches, etc.   4. Potential though less likely major side effects such as MI, stroke, and deep vein thrombosis. She has been asked to report any signs of such serious problems immediately.            "

## 2023-06-28 ENCOUNTER — OFFICE VISIT (OUTPATIENT)
Dept: ORTHOPEDICS | Facility: CLINIC | Age: 33
End: 2023-06-28
Payer: COMMERCIAL

## 2023-06-28 ENCOUNTER — HOSPITAL ENCOUNTER (OUTPATIENT)
Dept: RADIOLOGY | Facility: HOSPITAL | Age: 33
Discharge: HOME | End: 2023-06-28
Attending: PHYSICIAN ASSISTANT
Payer: COMMERCIAL

## 2023-06-28 DIAGNOSIS — M79.641 RIGHT HAND PAIN: Primary | ICD-10-CM

## 2023-06-28 DIAGNOSIS — G56.01 RIGHT CARPAL TUNNEL SYNDROME: Primary | ICD-10-CM

## 2023-06-28 DIAGNOSIS — M79.641 RIGHT HAND PAIN: ICD-10-CM

## 2023-06-28 PROCEDURE — 3044F PR MOST RECENT HEMOGLOBIN A1C LEVEL <7.0%: ICD-10-PCS | Mod: CPTII,S$GLB,, | Performed by: PHYSICIAN ASSISTANT

## 2023-06-28 PROCEDURE — 1159F MED LIST DOCD IN RCRD: CPT | Mod: CPTII,S$GLB,, | Performed by: PHYSICIAN ASSISTANT

## 2023-06-28 PROCEDURE — 99203 PR OFFICE/OUTPT VISIT, NEW, LEVL III, 30-44 MIN: ICD-10-PCS | Mod: S$GLB,,, | Performed by: PHYSICIAN ASSISTANT

## 2023-06-28 PROCEDURE — 73130 X-RAY EXAM OF HAND: CPT | Mod: 26,RT,, | Performed by: RADIOLOGY

## 2023-06-28 PROCEDURE — 73130 XR HAND COMPLETE 3 VIEW RIGHT: ICD-10-PCS | Mod: 26,RT,, | Performed by: RADIOLOGY

## 2023-06-28 PROCEDURE — 99999 PR PBB SHADOW E&M-EST. PATIENT-LVL II: CPT | Mod: PBBFAC,,, | Performed by: PHYSICIAN ASSISTANT

## 2023-06-28 PROCEDURE — 3044F HG A1C LEVEL LT 7.0%: CPT | Mod: CPTII,S$GLB,, | Performed by: PHYSICIAN ASSISTANT

## 2023-06-28 PROCEDURE — 73130 X-RAY EXAM OF HAND: CPT | Mod: TC,PO,RT

## 2023-06-28 PROCEDURE — 99999 PR PBB SHADOW E&M-EST. PATIENT-LVL II: ICD-10-PCS | Mod: PBBFAC,,, | Performed by: PHYSICIAN ASSISTANT

## 2023-06-28 PROCEDURE — 99203 OFFICE O/P NEW LOW 30 MIN: CPT | Mod: S$GLB,,, | Performed by: PHYSICIAN ASSISTANT

## 2023-06-28 PROCEDURE — 1159F PR MEDICATION LIST DOCUMENTED IN MEDICAL RECORD: ICD-10-PCS | Mod: CPTII,S$GLB,, | Performed by: PHYSICIAN ASSISTANT

## 2023-06-28 RX ORDER — METHYLPREDNISOLONE 4 MG/1
TABLET ORAL
Qty: 21 EACH | Refills: 0 | Status: SHIPPED | OUTPATIENT
Start: 2023-06-28 | End: 2023-07-19

## 2023-06-28 NOTE — PROGRESS NOTES
[facility]  History & Physical  Orthopedics    SUBJECTIVE:      Chief Complaint: right hand numbness    Referring Provider: Mani Hankins Dr. is the supervising physician for this encounter/patient    History of Present Illness:  Patient is a 32 y.o. right hand dominant female who presents today with complaints of right hand numbness present for 3 weeks. No trauma/injury or overuse. Pins/needles sensation present to the thumb/index/long fingers, this is worse in the morning. She was pregnant and delivered 2 months ago, no pregnancy induced carpal tunnel symptoms. No prior treatments for this. No surgical history on the hands.     Onset of symptoms/DOI was 3 weeks.    Symptoms are aggravated by at night.    Symptoms are alleviated by rest.    Symptoms consist of numbness/tingling.    The patient rates their pain as a 0/10.    Attempted treatment(s) and/or interventions include activity modifications, rest.     The patient denies any fevers, chills, N/V, D/C and presents for evaluation.       Past Medical History:   Diagnosis Date    Abnormal Pap smear 1/3/2013    Allergy     Anxiety     Panic attacks      No past surgical history on file.  Review of patient's allergies indicates:  No Known Allergies  Social History     Social History Narrative    Not on file     Family History   Problem Relation Age of Onset    Hypertension Father     Diabetes Father     Heart attacks under age 50 Father     Hypertension Maternal Grandmother     Hypertension Paternal Grandmother     Hypertension Paternal Grandfather     Diabetes Paternal Grandfather     Heart attack Paternal Grandfather     Breast cancer Neg Hx     Ovarian cancer Neg Hx     Melanoma Neg Hx          Current Outpatient Medications:     citalopram (CELEXA) 20 MG tablet, Take 1 tablet (20 mg total) by mouth once daily., Disp: 30 tablet, Rfl: 11    ferrous sulfate (IRON) 325 mg (65 mg iron) Tab tablet, Take 1 tablet (325 mg total) by  mouth daily with breakfast., Disp: 30 tablet, Rfl: 1    methylPREDNISolone (MEDROL DOSEPACK) 4 mg tablet, use as directed, Disp: 21 each, Rfl: 0    NIFEdipine (PROCARDIA-XL) 60 MG (OSM) 24 hr tablet, Take 1 tablet (60 mg total) by mouth once daily., Disp: 30 tablet, Rfl: 11    norethindrone-ethinyl estradiol (JUNEL FE 1/20) 1 mg-20 mcg (21)/75 mg (7) per tablet, Take 1 tablet by mouth once daily., Disp: 30 tablet, Rfl: 11    prenatal 25/iron fum/folic/dha (PRENATAL-1 ORAL), Take by mouth., Disp: , Rfl:       Review of Systems:  Constitutional: no fever or chills  Eyes: no visual changes  ENT: no nasal congestion or sore throat  Respiratory: no cough or shortness of breath  Cardiovascular: no chest pain  Gastrointestinal: no nausea or vomiting, tolerating diet  Musculoskeletal: numbness/tingling    OBJECTIVE:      Vital Signs (Most Recent):  There were no vitals filed for this visit.  There is no height or weight on file to calculate BMI.      Physical Exam:  Constitutional: The patient appears well-developed and well-nourished. No distress.   Skin: No lesions appreciated  Head: Normocephalic and atraumatic.   Nose: Nose normal.   Ears: No deformities seen  Eyes: Conjunctivae and EOM are normal.   Neck: No tracheal deviation present.   Cardiovascular: Normal rate and intact distal pulses.    Pulmonary/Chest: Effort normal. No respiratory distress.   Abdominal: There is no guarding.   Neurological: The patient is alert.   Psychiatric: The patient has a normal mood and affect.     Right Hand/Wrist Examination:    Observation/Inspection:  Swelling  none    Deformity  none  Discoloration  none     Scars   none    Atrophy  none    HAND/WRIST EXAMINATION:  Finkelstein's Test   Neg  WHAT Test    Neg  Snuff box tenderness   Neg  Camejo's Test    Neg  Hook of Hamate Tenderness  Neg  CMC grind    Neg  Circumduction test   Neg    Neurovascular Exam:  Digits WWP, brisk CR < 3s throughout  NVI motor/LTS to M/R/U nerves, radial  pulse 2+  Tinel's Test - Carpal Tunnel  Neg  Tinel's Test - Cubital Tunnel  Neg  Phalen's Test    Neg  Median Nerve Compression Test Neg    ROM hand full, painless    ROM wrist full, painless    ROM elbow full, painless    Abdomen not guarded  Respirations nonlabored  Perfusion intact    Diagnostic Results:     Imaging - I independently viewed the patient's imaging as well as the radiology report.  Xrays of the patient's right hand  demonstrate no evidence of any acute fractures or dislocations or significant degenerative changes.    EMG - none    ASSESSMENT/PLAN:      32 y.o. yo female with Right hand carpal tunnel symptoms, acute    Plan: The patient and I had a thorough discussion today.  We discussed the working diagnosis as well as several other potential alternative diagnoses.  Treatment options were discussed, both conservative and surgical.  Conservative treatment options would include things such as activity modifications, workplace modifications, a period of rest, oral vs topical OTC and prescription anti-inflammatory medications, occupational therapy, splinting/bracing, immobilization, corticosteroid injections, and others.  Surgical options were discussed as well.     At this time, the patient would like to proceed with a trial of carpal tunnel brace given today, wear to sleep and during the day as needed. Medrol dosepack ordered today. If no improvement, she can RTC for CSI or EMG.    Should the patient's symptoms worsen, persist, or fail to improve they should return for reevaluation and I would be happy to see them back anytime.           Please do not hesitate to reach out to us via email, phone, or MyChart with any questions, concerns, or feedback.

## 2023-07-24 PROBLEM — O13.3 GESTATIONAL HYPERTENSION, THIRD TRIMESTER: Status: RESOLVED | Noted: 2023-04-18 | Resolved: 2023-07-24

## 2023-07-28 ENCOUNTER — PATIENT MESSAGE (OUTPATIENT)
Dept: ORTHOPEDICS | Facility: CLINIC | Age: 33
End: 2023-07-28
Payer: COMMERCIAL

## 2023-10-08 ENCOUNTER — PATIENT MESSAGE (OUTPATIENT)
Dept: OBSTETRICS AND GYNECOLOGY | Facility: CLINIC | Age: 33
End: 2023-10-08
Payer: COMMERCIAL

## 2023-10-08 DIAGNOSIS — Z30.2 ENCOUNTER FOR STERILIZATION: Primary | ICD-10-CM

## 2023-10-11 ENCOUNTER — TELEPHONE (OUTPATIENT)
Dept: OBSTETRICS AND GYNECOLOGY | Facility: CLINIC | Age: 33
End: 2023-10-11
Payer: COMMERCIAL

## 2023-10-11 NOTE — TELEPHONE ENCOUNTER
----- Message from Rose Cordova MD sent at 10/10/2023 12:54 PM CDT -----    Requested Date: 11/21   Requested Time: 11   Case Length:90 minutes    Surgeon: Rose Cordova      Assistant Surgeon: Candice   Visit Type: Outpatient    LOCATION: [facility]    PROCEDURE:Laparoscopic bilateral salpingectomy  Diagnosis:Encounter for Sterilization  Anesthesia type: General   Comments/Special Equipment Needed:  Rep needed: No  Does the patient need a PreOp appointment with MD: Yes - already scheduled  U/S needed at pre-op: No  PCP clearance: Not Needed  When does she need her Post op appointment: 2 weeks virtual   Do you need additional time blocked out from clinic? No  If so, what time do you want to be back in clinic? N/A  When can the patient go back to work? one week

## 2023-10-13 DIAGNOSIS — Z30.017 ENCOUNTER FOR INITIAL PRESCRIPTION OF NEXPLANON: Primary | ICD-10-CM

## 2023-10-24 ENCOUNTER — TELEPHONE (OUTPATIENT)
Dept: OBSTETRICS AND GYNECOLOGY | Facility: CLINIC | Age: 33
End: 2023-10-24
Payer: COMMERCIAL

## 2023-10-24 NOTE — TELEPHONE ENCOUNTER
[facility] called stating that no sx was booked for this pt and she has a preadmit appt on 10/25.      Called pt to confirm cancellation of sx.  Pt confirmed she canceled her sx.  Advised her preadmit appt tomorrow will be canceled as well.  Notified preadmit.    Pt inquiring about her Nexplanon appt.  Advised that the staff is working on making sure it's approved before calling pt to schedule.

## 2023-10-25 ENCOUNTER — TELEPHONE (OUTPATIENT)
Dept: OBSTETRICS AND GYNECOLOGY | Facility: CLINIC | Age: 33
End: 2023-10-25
Payer: COMMERCIAL

## 2023-11-01 ENCOUNTER — DOCUMENTATION ONLY (OUTPATIENT)
Dept: REHABILITATION | Facility: HOSPITAL | Age: 33
End: 2023-11-01
Payer: COMMERCIAL

## 2023-11-01 NOTE — PROGRESS NOTES
[facility]   Discharge Note    Name: Carie Nick [facility] Number: [id number]    Therapy Diagnosis: No diagnosis found.  Physician: No ref. provider found    Physician Orders: PT Eval and Treat   Medical Diagnosis from Referral: O99.891,M54.9 (ICD-10-CM) - Back pain affecting pregnancy, antepartum  Evaluation Date: 1/23/2023      Date of Last visit: 2/9/23  Total Visits Received: 3    ASSESSMENT        Discharge reason: Patient has not attended therapy since 2/9/23    Goals: not able to determine goal status    PLAN   This patient is discharged from Physical Therapy      Karen Guevara PT

## 2023-11-14 ENCOUNTER — PATIENT MESSAGE (OUTPATIENT)
Dept: OBSTETRICS AND GYNECOLOGY | Facility: CLINIC | Age: 33
End: 2023-11-14

## 2023-11-14 ENCOUNTER — PROCEDURE VISIT (OUTPATIENT)
Dept: OBSTETRICS AND GYNECOLOGY | Facility: CLINIC | Age: 33
End: 2023-11-14
Payer: COMMERCIAL

## 2023-11-14 VITALS
HEIGHT: 65 IN | SYSTOLIC BLOOD PRESSURE: 128 MMHG | WEIGHT: 293 LBS | DIASTOLIC BLOOD PRESSURE: 80 MMHG | BODY MASS INDEX: 48.82 KG/M2

## 2023-11-14 DIAGNOSIS — Z30.017 NEXPLANON INSERTION: Primary | ICD-10-CM

## 2023-11-14 LAB
B-HCG UR QL: NEGATIVE
CTP QC/QA: YES

## 2023-11-14 PROCEDURE — 11981 INSERTION DRUG DLVR IMPLANT: CPT | Mod: S$GLB,,, | Performed by: OBSTETRICS & GYNECOLOGY

## 2023-11-14 PROCEDURE — 81025 POCT URINE PREGNANCY: ICD-10-PCS | Mod: S$GLB,,, | Performed by: OBSTETRICS & GYNECOLOGY

## 2023-11-14 PROCEDURE — 81025 URINE PREGNANCY TEST: CPT | Mod: S$GLB,,, | Performed by: OBSTETRICS & GYNECOLOGY

## 2023-11-14 PROCEDURE — 11981 INSERTION OF NEXPLANON: ICD-10-PCS | Mod: S$GLB,,, | Performed by: OBSTETRICS & GYNECOLOGY

## 2023-11-14 NOTE — PROCEDURES
Insertion of Nexplanon    Date/Time: 11/14/2023 2:30 PM    Performed by: Rose Cordova MD  Authorized by: Rose Cordova MD    Consent:   Consent given by:  Patient  Patient questions answered: yes    Patient agrees, verbalizes understanding, and wants to proceed: yes    Educational handouts given: no    Instructions and paperwork completed: yes    Pre-Procedure:   Pre-procedure timeout performed: yes    Prepped with: alcohol 70% and povidone-iodine    Local anesthetic:  Lidocaine 1%  The site was cleaned and prepped in a sterile fashion: yes    Insertion Procedure:   Left/right:  left arm   68 mg etonogestreL 68 mg  Preloaded Implanon trocar was placed subdermally: yes    Visualization of implant was obtained: yes    Nexplanon was inserted and trocar removed: yes    Visualization of notch in stilette and palpitation of device: yes    Site closed with Steri-Strips: Site was closed with bandaid and pressure dressing applied.

## 2024-02-14 ENCOUNTER — OFFICE VISIT (OUTPATIENT)
Dept: OBSTETRICS AND GYNECOLOGY | Facility: CLINIC | Age: 34
End: 2024-02-14
Payer: COMMERCIAL

## 2024-02-14 VITALS
SYSTOLIC BLOOD PRESSURE: 120 MMHG | BODY MASS INDEX: 48.82 KG/M2 | WEIGHT: 293 LBS | DIASTOLIC BLOOD PRESSURE: 84 MMHG | HEIGHT: 65 IN

## 2024-02-14 DIAGNOSIS — Z00.00 ENCOUNTER FOR GENERAL HEALTH EXAMINATION: ICD-10-CM

## 2024-02-14 DIAGNOSIS — R63.5 WEIGHT GAIN: Primary | ICD-10-CM

## 2024-02-14 PROCEDURE — 3079F DIAST BP 80-89 MM HG: CPT | Mod: CPTII,S$GLB,,

## 2024-02-14 PROCEDURE — 3074F SYST BP LT 130 MM HG: CPT | Mod: CPTII,S$GLB,,

## 2024-02-14 PROCEDURE — 99213 OFFICE O/P EST LOW 20 MIN: CPT | Mod: S$GLB,,,

## 2024-02-14 PROCEDURE — 3008F BODY MASS INDEX DOCD: CPT | Mod: CPTII,S$GLB,,

## 2024-02-14 PROCEDURE — 1159F MED LIST DOCD IN RCRD: CPT | Mod: CPTII,S$GLB,,

## 2024-02-14 PROCEDURE — 99999 PR PBB SHADOW E&M-EST. PATIENT-LVL IV: CPT | Mod: PBBFAC,,,

## 2024-02-14 NOTE — PROGRESS NOTES
"Chief Complaint: Weight Gain     HPI:      Carie Estrada is a 33 y.o.  who presents today w/ complaints of weight gain.  Reports postpartum weight in April was around 240 and since then she has gained almost 70 lbs.  Currently 308 lbs.  Reports she has noticed the most amount of weight gain in the last 2-3 months. Denies any significant changes in these last few months. Of note, she did have Nexplanon for contraception placed in November.  Reports she has been working out 3-4 times/week.  Not dieting but watching what she eats.  Has cut out fast food.  Typical day consists of oatmeal in the morning, salad at lunch (M-F), meat and vegetable w/ starch for dinner, and sometimes a cookie or two. Denies significant snacking throughout the day.  A friend told her about Wegovy and her weight loss so she is inquiring about that today.  Does not have PCP.  Hasn't noticed any other issues.  Denies hot/cold intolerance, constipation, hair loss, fatigue, generalized ill feeling.  Denies any other changes in medications.    Physical Exam:      PHYSICAL EXAM:  /84 (BP Location: Left arm, Patient Position: Sitting, BP Method: Large (Manual))   Ht 5' 5" (1.651 m)   Wt (!) 140.1 kg (308 lb 13.8 oz)   BMI 51.40 kg/m²   Body mass index is 51.4 kg/m².     Alert and oriented x 3.   APPEARANCE: Overweight/obese, well developed, in no acute distress.  PSYCH: Appropriate mood and affect.  SKIN: No acne or hirsutism  NECK: Neck symmetric without masses  NODES: No inguinal, axillary, or supraclavicular lymph node enlargement  ABDOMEN:  Abdomen is obese, soft, nontender and nondistended.   EXTREMITIES: No edema, clubbing or cyanosis.     Assessment/Plan:     Weight gain  -     Ambulatory referral/consult to Internal Medicine; Future; Expected date: 2024  -     Ambulatory referral/consult to Bariatric/Obesity Medicine; Future; Expected date: 2024  -     Comprehensive Metabolic Panel; Future  -     CBC " Auto Differential; Future  -     Vitamin D; Future; Expected date: 02/14/2024  -     TSH; Future  -     T4, Free; Future; Expected date: 02/14/2024  -     Lipid Panel; Future  -     HEMOGLOBIN A1C; Future; Expected date: 02/14/2024  -     Ambulatory referral/consult to Nutrition Services; Future; Expected date: 02/21/2024    Encounter for general health examination  -     Ambulatory referral/consult to Internal Medicine; Future; Expected date: 02/21/2024  -     CBC Auto Differential; Future  -     Vitamin D; Future; Expected date: 02/14/2024  -     TSH; Future  -     T4, Free; Future; Expected date: 02/14/2024  -     Lipid Panel; Future  -     HEMOGLOBIN A1C; Future; Expected date: 02/14/2024    BMI 50.0-59.9, adult  -     Ambulatory referral/consult to Bariatric/Obesity Medicine; Future; Expected date: 02/21/2024  -     Ambulatory referral/consult to Nutrition Services; Future; Expected date: 02/21/2024    Fasting labs.  Referral to PCP.  Pt needs to get established.  Referral placed to bariatric medicine and nutritional services.  Discussed keeping daily food diary and tracking calories.   Recommended intermittent fasting and decreased carb intake.  Continue exercise with focus on cardio.  Recommend multivitamin.  Follow up with referrals for further weight management.    Follow up MIKKI.    Shana Pereira DNP (Maggie)  Obstetrics and Gynecology  [facility]

## 2024-02-16 ENCOUNTER — LAB VISIT (OUTPATIENT)
Dept: LAB | Facility: OTHER | Age: 34
End: 2024-02-16
Payer: COMMERCIAL

## 2024-02-16 ENCOUNTER — PATIENT MESSAGE (OUTPATIENT)
Dept: OBSTETRICS AND GYNECOLOGY | Facility: CLINIC | Age: 34
End: 2024-02-16
Payer: COMMERCIAL

## 2024-02-16 DIAGNOSIS — Z00.00 ENCOUNTER FOR GENERAL HEALTH EXAMINATION: ICD-10-CM

## 2024-02-16 DIAGNOSIS — R63.5 WEIGHT GAIN: ICD-10-CM

## 2024-02-16 LAB
25(OH)D3+25(OH)D2 SERPL-MCNC: 15 NG/ML (ref 30–96)
ALBUMIN SERPL BCP-MCNC: 4.1 G/DL (ref 3.5–5.2)
ALP SERPL-CCNC: 67 U/L (ref 55–135)
ALT SERPL W/O P-5'-P-CCNC: 52 U/L (ref 10–44)
ANION GAP SERPL CALC-SCNC: 8 MMOL/L (ref 8–16)
AST SERPL-CCNC: 45 U/L (ref 10–40)
BASOPHILS # BLD AUTO: 0.02 K/UL (ref 0–0.2)
BASOPHILS NFR BLD: 0.6 % (ref 0–1.9)
BILIRUB SERPL-MCNC: 0.4 MG/DL (ref 0.1–1)
BUN SERPL-MCNC: 9 MG/DL (ref 6–20)
CALCIUM SERPL-MCNC: 9.4 MG/DL (ref 8.7–10.5)
CHLORIDE SERPL-SCNC: 107 MMOL/L (ref 95–110)
CHOLEST SERPL-MCNC: 185 MG/DL (ref 120–199)
CHOLEST/HDLC SERPL: 3.5 {RATIO} (ref 2–5)
CO2 SERPL-SCNC: 23 MMOL/L (ref 23–29)
CREAT SERPL-MCNC: 0.8 MG/DL (ref 0.5–1.4)
DIFFERENTIAL METHOD BLD: ABNORMAL
EOSINOPHIL # BLD AUTO: 0.2 K/UL (ref 0–0.5)
EOSINOPHIL NFR BLD: 4.2 % (ref 0–8)
ERYTHROCYTE [DISTWIDTH] IN BLOOD BY AUTOMATED COUNT: 13 % (ref 11.5–14.5)
EST. GFR  (NO RACE VARIABLE): >60 ML/MIN/1.73 M^2
ESTIMATED AVG GLUCOSE: 117 MG/DL (ref 68–131)
GLUCOSE SERPL-MCNC: 103 MG/DL (ref 70–110)
HBA1C MFR BLD: 5.7 % (ref 4–5.6)
HCT VFR BLD AUTO: 39.8 % (ref 37–48.5)
HDLC SERPL-MCNC: 53 MG/DL (ref 40–75)
HDLC SERPL: 28.6 % (ref 20–50)
HGB BLD-MCNC: 12.6 G/DL (ref 12–16)
IMM GRANULOCYTES # BLD AUTO: 0.01 K/UL (ref 0–0.04)
IMM GRANULOCYTES NFR BLD AUTO: 0.3 % (ref 0–0.5)
LDLC SERPL CALC-MCNC: 121.4 MG/DL (ref 63–159)
LYMPHOCYTES # BLD AUTO: 1.2 K/UL (ref 1–4.8)
LYMPHOCYTES NFR BLD: 34.6 % (ref 18–48)
MCH RBC QN AUTO: 28.6 PG (ref 27–31)
MCHC RBC AUTO-ENTMCNC: 31.7 G/DL (ref 32–36)
MCV RBC AUTO: 90 FL (ref 82–98)
MONOCYTES # BLD AUTO: 0.5 K/UL (ref 0.3–1)
MONOCYTES NFR BLD: 12.9 % (ref 4–15)
NEUTROPHILS # BLD AUTO: 1.7 K/UL (ref 1.8–7.7)
NEUTROPHILS NFR BLD: 47.4 % (ref 38–73)
NONHDLC SERPL-MCNC: 132 MG/DL
NRBC BLD-RTO: 0 /100 WBC
PLATELET # BLD AUTO: 312 K/UL (ref 150–450)
PMV BLD AUTO: 9.1 FL (ref 9.2–12.9)
POTASSIUM SERPL-SCNC: 4.8 MMOL/L (ref 3.5–5.1)
PROT SERPL-MCNC: 7.6 G/DL (ref 6–8.4)
RBC # BLD AUTO: 4.41 M/UL (ref 4–5.4)
SODIUM SERPL-SCNC: 138 MMOL/L (ref 136–145)
T4 FREE SERPL-MCNC: 0.86 NG/DL (ref 0.71–1.51)
TRIGL SERPL-MCNC: 53 MG/DL (ref 30–150)
TSH SERPL DL<=0.005 MIU/L-ACNC: 1.11 UIU/ML (ref 0.4–4)
WBC # BLD AUTO: 3.56 K/UL (ref 3.9–12.7)

## 2024-02-16 PROCEDURE — 84443 ASSAY THYROID STIM HORMONE: CPT

## 2024-02-16 PROCEDURE — 82306 VITAMIN D 25 HYDROXY: CPT

## 2024-02-16 PROCEDURE — 80061 LIPID PANEL: CPT

## 2024-02-16 PROCEDURE — 85025 COMPLETE CBC W/AUTO DIFF WBC: CPT

## 2024-02-16 PROCEDURE — 84439 ASSAY OF FREE THYROXINE: CPT

## 2024-02-16 PROCEDURE — 83036 HEMOGLOBIN GLYCOSYLATED A1C: CPT

## 2024-02-16 PROCEDURE — 80053 COMPREHEN METABOLIC PANEL: CPT

## 2024-02-16 PROCEDURE — 36415 COLL VENOUS BLD VENIPUNCTURE: CPT

## 2024-02-20 ENCOUNTER — TELEPHONE (OUTPATIENT)
Dept: ADMINISTRATIVE | Facility: OTHER | Age: 34
End: 2024-02-20
Payer: COMMERCIAL

## 2024-02-20 ENCOUNTER — PATIENT MESSAGE (OUTPATIENT)
Dept: ADMINISTRATIVE | Facility: OTHER | Age: 34
End: 2024-02-20
Payer: COMMERCIAL

## 2024-02-20 NOTE — TELEPHONE ENCOUNTER
SHELL with scheduled  Nutrition appointment of 3-, and contact number provided for any questions. My Chart message to be sent.

## 2024-02-21 ENCOUNTER — PATIENT OUTREACH (OUTPATIENT)
Dept: ADMINISTRATIVE | Facility: HOSPITAL | Age: 34
End: 2024-02-21
Payer: COMMERCIAL

## 2024-02-21 NOTE — PROGRESS NOTES
Health Maintenance Due   Topic Date Due    Cervical Cancer Screening  02/05/2023    Influenza Vaccine (1) 09/01/2023    COVID-19 Vaccine (2 - 2023-24 season) 09/01/2023        Chart review done.   HM updated.   Immunizations reviewed & updated.   Care Everywhere updated.  [facility] reviewed

## 2024-02-28 ENCOUNTER — OFFICE VISIT (OUTPATIENT)
Dept: PRIMARY CARE CLINIC | Facility: CLINIC | Age: 34
End: 2024-02-28
Payer: COMMERCIAL

## 2024-02-28 VITALS
OXYGEN SATURATION: 99 % | DIASTOLIC BLOOD PRESSURE: 82 MMHG | BODY MASS INDEX: 48.82 KG/M2 | HEIGHT: 65 IN | SYSTOLIC BLOOD PRESSURE: 120 MMHG | HEART RATE: 107 BPM | WEIGHT: 293 LBS | TEMPERATURE: 99 F

## 2024-02-28 DIAGNOSIS — I10 ESSENTIAL HYPERTENSION: ICD-10-CM

## 2024-02-28 DIAGNOSIS — R74.01 TRANSAMINITIS: ICD-10-CM

## 2024-02-28 DIAGNOSIS — R73.03 PREDIABETES: ICD-10-CM

## 2024-02-28 DIAGNOSIS — Z00.00 ENCOUNTER FOR GENERAL HEALTH EXAMINATION: Primary | ICD-10-CM

## 2024-02-28 PROCEDURE — 99999 PR PBB SHADOW E&M-EST. PATIENT-LVL IV: CPT | Mod: PBBFAC,,, | Performed by: STUDENT IN AN ORGANIZED HEALTH CARE EDUCATION/TRAINING PROGRAM

## 2024-02-28 PROCEDURE — 99395 PREV VISIT EST AGE 18-39: CPT | Mod: S$GLB,,, | Performed by: STUDENT IN AN ORGANIZED HEALTH CARE EDUCATION/TRAINING PROGRAM

## 2024-02-28 PROCEDURE — 3079F DIAST BP 80-89 MM HG: CPT | Mod: CPTII,S$GLB,, | Performed by: STUDENT IN AN ORGANIZED HEALTH CARE EDUCATION/TRAINING PROGRAM

## 2024-02-28 PROCEDURE — 1159F MED LIST DOCD IN RCRD: CPT | Mod: CPTII,S$GLB,, | Performed by: STUDENT IN AN ORGANIZED HEALTH CARE EDUCATION/TRAINING PROGRAM

## 2024-02-28 PROCEDURE — 3044F HG A1C LEVEL LT 7.0%: CPT | Mod: CPTII,S$GLB,, | Performed by: STUDENT IN AN ORGANIZED HEALTH CARE EDUCATION/TRAINING PROGRAM

## 2024-02-28 PROCEDURE — 3074F SYST BP LT 130 MM HG: CPT | Mod: CPTII,S$GLB,, | Performed by: STUDENT IN AN ORGANIZED HEALTH CARE EDUCATION/TRAINING PROGRAM

## 2024-02-28 PROCEDURE — 3008F BODY MASS INDEX DOCD: CPT | Mod: CPTII,S$GLB,, | Performed by: STUDENT IN AN ORGANIZED HEALTH CARE EDUCATION/TRAINING PROGRAM

## 2024-02-28 NOTE — PROGRESS NOTES
"  Primary Care  Annual Office Visit - In Person  2/28/2024          Patient is a 33 y.o.   Carie Estrada  has a past medical history of Abnormal Pap smear (1/3/2013), Allergy, Anxiety, and Panic attacks.      Patient has no acute complaints today     She reports challenges losing weight with diet and exercise  She started exercising regularly 3x/week in December and meals typically consist of a protein and vegetables     Active Medications  Current Outpatient Medications   Medication Instructions    citalopram (CELEXA) 20 mg, Oral, Daily    ferrous sulfate (IRON) 325 mg, Oral, With breakfast    NIFEdipine (PROCARDIA-XL) 60 mg, Oral, Daily    prenatal 25/iron fum/folic/dha (PRENATAL-1 ORAL) Oral       Annual Review of Preventative Care    Health Maintenance Due   Topic Date Due    Cervical Cancer Screening  02/05/2023    Influenza Vaccine (1) 09/01/2023    COVID-19 Vaccine (4 - 2023-24 season) 09/01/2023     Diabetes Screening:    Lab Results   Component Value Date    HGBA1C 5.7 (H) 02/16/2024    HGBA1C 5.2 04/18/2023    HGBA1C 5.3 09/08/2017     BP Readings from Last 3 Encounters:   02/28/24 120/82   02/14/24 120/84   11/14/23 128/80     Wt Readings from Last 3 Encounters:   02/28/24 1522 (!) 142 kg (313 lb 0.9 oz)   02/14/24 0852 (!) 140.1 kg (308 lb 13.8 oz)   11/14/23 1436 134.1 kg (295 lb 10.2 oz)     Pap: Next Due 2024    Last STD:    No results found for: "CHLAMYDIA"  No components found for: "GC"  Trichomonas vaginalis   Date Value Ref Range Status   01/05/2018 Negative Negative Final     No components found for: "UROGENITAL"      Physical Exam  Vitals reviewed.   Constitutional:       General: She is not in acute distress.  Eyes:      Pupils: Pupils are equal, round, and reactive to light.   Cardiovascular:      Rate and Rhythm: Normal rate and regular rhythm.      Pulses: Normal pulses.      Heart sounds: Normal heart sounds.   Pulmonary:      Effort: Pulmonary effort is normal.      Breath " sounds: Normal breath sounds.   Abdominal:      General: Abdomen is flat. Bowel sounds are normal.      Palpations: Abdomen is soft.   Musculoskeletal:      Right lower leg: No edema.      Left lower leg: No edema.               Assessment and Plan     1. Encounter for general health examination  -     Ambulatory referral/consult to Internal Medicine    2. Transaminitis  Comments:  Pt denies alcohol or herbal supplement use   Possibly GARCIA  Orders:  -     HEPATIC FUNCTION PANEL; Future; Expected date: 02/28/2024    3. Essential hypertension  Comments:  Well controlled with nifedipine 60 mg daily    4. Prediabetes  Comments:  HbA1C 5.7  Counseled on diet changes                                     Upcoming Scheduled Appointments and Follow Up:    Future Appointments   Date Time Provider Department Center   3/5/2024  3:00 PM BARIATRIC SURG FRED BILLINGSLEY [facility] [place]   3/14/2024  8:30 AM ISHAAN KENNEY [facility] [address]       Follow Up [facility] (with who? when?): Follow up in about 4 months (around 6/28/2024).        Extended Emergency Contact Information  Primary Emergency Contact: Kim Estrada  Address: [address] [place] [address]  Mobile Phone: [phone]  Relation: Spouse  Secondary Emergency Contact: Dipesh Donnelly   [place]  Mobile Phone: [phone]  Relation: Mother      Teddy Austin MD   Internal Medicine  2/28/2024 - 3:42 PM    I spent a total of 30 minutes on the day of the visit.This includes face to face time and non-face to face time preparing to see the patient (eg, review of tests), obtaining and/or reviewing separately obtained history, documenting clinical information in the electronic or other health record, independently interpreting results and communicating results to the patient/family/caregiver, or care coordinator.    While patients have the right to access their medical record, it is  essential to recognize that progress notes primarily serve as a means of communication among healthcare professionals.

## 2024-03-11 ENCOUNTER — TELEPHONE (OUTPATIENT)
Dept: BARIATRICS | Facility: CLINIC | Age: 34
End: 2024-03-11
Payer: COMMERCIAL

## 2024-03-11 NOTE — TELEPHONE ENCOUNTER
"FS call completed 3/7/2024 noting:"Reviewed and verified patient demographics. Informed patient of benefits and financial responsibility. Patient must contact [facility] bariatric program and enroll and get on wait list prior to scheduling.Patient stated understanding and will contact [facility] to start the process. No further action required at this time." Routed to VILMA Prajapati.   "

## 2024-03-12 ENCOUNTER — TELEPHONE (OUTPATIENT)
Dept: BARIATRICS | Facility: CLINIC | Age: 34
End: 2024-03-12
Payer: COMMERCIAL

## 2024-04-01 ENCOUNTER — PATIENT MESSAGE (OUTPATIENT)
Dept: PRIMARY CARE CLINIC | Facility: CLINIC | Age: 34
End: 2024-04-01
Payer: COMMERCIAL

## 2024-04-02 RX ORDER — BUPROPION HYDROCHLORIDE 150 MG/1
150 TABLET ORAL DAILY
Qty: 30 TABLET | Refills: 0 | Status: SHIPPED | OUTPATIENT
Start: 2024-04-02 | End: 2024-05-02

## 2024-05-01 NOTE — TELEPHONE ENCOUNTER
No care due was identified.  [facility] Embedded Care Due Messages. Reference number: [id number].   5/01/2024 11:35:12 AM CDT

## 2024-05-02 ENCOUNTER — PATIENT MESSAGE (OUTPATIENT)
Dept: PRIMARY CARE CLINIC | Facility: CLINIC | Age: 34
End: 2024-05-02

## 2024-05-02 RX ORDER — BUPROPION HYDROCHLORIDE 150 MG/1
150 TABLET ORAL
Qty: 90 TABLET | Refills: 2 | OUTPATIENT
Start: 2024-05-02

## 2024-05-02 RX ORDER — BUPROPION HYDROCHLORIDE 150 MG/1
150 TABLET, EXTENDED RELEASE ORAL 2 TIMES DAILY
Qty: 60 TABLET | Refills: 0 | Status: SHIPPED | OUTPATIENT
Start: 2024-05-02 | End: 2024-06-01

## 2024-05-02 NOTE — TELEPHONE ENCOUNTER
Refill Routing Note   Medication(s) are not appropriate for processing by [facility] for the following reason(s):        New or recently adjusted medication    [facility] action(s):  Defer               Appointments  past 12m or future 3m with PCP    Date Provider   Last Visit   2/28/2024 Teddy Austin MD   Next Visit   5/2/2024 Teddy Austin MD   ED visits in past 90 days: 0        Note composed:10:04 PM 05/01/2024

## 2024-05-07 ENCOUNTER — OFFICE VISIT (OUTPATIENT)
Dept: PRIMARY CARE CLINIC | Facility: CLINIC | Age: 34
End: 2024-05-07
Payer: COMMERCIAL

## 2024-05-07 DIAGNOSIS — E66.01 CLASS 3 SEVERE OBESITY DUE TO EXCESS CALORIES WITH SERIOUS COMORBIDITY AND BODY MASS INDEX (BMI) OF 45.0 TO 49.9 IN ADULT: Primary | ICD-10-CM

## 2024-05-07 PROCEDURE — 3044F HG A1C LEVEL LT 7.0%: CPT | Mod: CPTII,95,, | Performed by: STUDENT IN AN ORGANIZED HEALTH CARE EDUCATION/TRAINING PROGRAM

## 2024-05-07 PROCEDURE — 99213 OFFICE O/P EST LOW 20 MIN: CPT | Mod: 95,,, | Performed by: STUDENT IN AN ORGANIZED HEALTH CARE EDUCATION/TRAINING PROGRAM

## 2024-05-07 NOTE — PROGRESS NOTES
Telehealth Visit    The patient location is: [place]   The chief complaint leading to consultation is: Med follow up     Visit type: audiovisual      Face to Face time with patient: 5 minutes  15 minutes of total time spent on the encounter, which includes face to face time and non-face to face time preparing to see the patient (eg, review of tests), Obtaining and/or reviewing separately obtained history, Documenting clinical information in the electronic or other health record, Independently interpreting results (not separately reported) and communicating results to the patient/family/caregiver, or Care coordination (not separately reported).       HPI    Patient is a 33 y.o.   Carie Estrada  has a past medical history of Abnormal Pap smear (1/3/2013), Allergy, Anxiety, and Panic attacks.    Patient presenting for medication follow up     She denies any side effects from Wellbutrin             Active Medications:  Current Outpatient Medications   Medication Instructions    buPROPion (WELLBUTRIN SR) 150 mg, Oral, 2 times daily    citalopram (CELEXA) 20 mg, Oral, Daily    ferrous sulfate (IRON) 325 mg, Oral, With breakfast    NIFEdipine (PROCARDIA-XL) 60 mg, Oral, Daily    prenatal 25/iron fum/folic/dha (PRENATAL-1 ORAL) Oral       Physical Exam    General: Does not appear to be in acute distress    Assessment and Plan     1. Class 3 severe obesity due to excess calories with serious comorbidity and body mass index (BMI) of 45.0 to 49.9 in adult  Comments:  Continue to titrate Wellbutrin as tolerated                 Upcoming Scheduled Appointments and Follow Up:    No future appointments.    Follow Up [facility] (with who? when?): No follow-ups on file.        Extended Emergency Contact Information  Primary Emergency Contact: Kim Estrada  Address: [address] [place] [address] [place]  Mobile Phone: [phone]  Relation: Spouse  Secondary Emergency  Contact: Dipesh Donnelly   [place]  Mobile Phone: [phone]  Relation: Mother      Jeanniejermainemelissa Austin MD   Internal Medicine  5/7/2024 - 4:15 PM        Each patient to whom he or she provides medical services by telemedicine is:  (1) informed of the relationship between the physician and patient and the respective role of any other health care provider with respect to management of the patient; and (2) notified that he or she may decline to receive medical services by telemedicine and may withdraw from such care at any time.    While patients have the right to access their medical record, it is essential to recognize that progress notes primarily serve as a means of communication among healthcare professionals.

## 2024-08-31 NOTE — TELEPHONE ENCOUNTER
No care due was identified.  [facility] Embedded Care Due Messages. Reference number: [id number].   8/31/2024 1:32:19 PM CDT

## 2024-09-01 RX ORDER — BUPROPION HYDROCHLORIDE 150 MG/1
150 TABLET ORAL
Qty: 30 TABLET | Refills: 0 | OUTPATIENT
Start: 2024-09-01

## 2024-09-01 NOTE — TELEPHONE ENCOUNTER
Refill Routing Note   Medication(s) are not appropriate for processing by [facility] for the following reason(s):        Clarification of medication (Rx) details    [facility] action(s):  Defer      Medication Therapy Plan: Bupropion increased to twice daily (5/2/24); Will pend current dose for your approval; Please advise      Appointments  past 12m or future 3m with PCP    Date Provider   Last Visit   5/7/2024 Teddy Austin MD   Next Visit   Visit date not found Teddy Austin MD   ED visits in past 90 days: 0        Note composed:10:09 AM 09/01/2024

## 2024-09-03 ENCOUNTER — PATIENT MESSAGE (OUTPATIENT)
Dept: PRIMARY CARE CLINIC | Facility: CLINIC | Age: 34
End: 2024-09-03
Payer: COMMERCIAL

## 2024-09-03 RX ORDER — BUPROPION HYDROCHLORIDE 150 MG/1
150 TABLET, EXTENDED RELEASE ORAL 2 TIMES DAILY
Qty: 180 TABLET | Refills: 1 | Status: SHIPPED | OUTPATIENT
Start: 2024-09-03 | End: 2025-03-02

## 2024-09-20 ENCOUNTER — TELEPHONE (OUTPATIENT)
Dept: ORTHOPEDICS | Facility: CLINIC | Age: 34
End: 2024-09-20
Payer: COMMERCIAL

## 2024-09-20 DIAGNOSIS — M25.531 RIGHT WRIST PAIN: ICD-10-CM

## 2024-09-20 DIAGNOSIS — M79.641 RIGHT HAND PAIN: Primary | ICD-10-CM

## 2024-09-27 DIAGNOSIS — M79.641 RIGHT HAND PAIN: Primary | ICD-10-CM

## 2024-09-30 ENCOUNTER — HOSPITAL ENCOUNTER (OUTPATIENT)
Dept: RADIOLOGY | Facility: OTHER | Age: 34
Discharge: HOME | End: 2024-09-30
Payer: COMMERCIAL

## 2024-09-30 ENCOUNTER — OFFICE VISIT (OUTPATIENT)
Dept: ORTHOPEDICS | Facility: CLINIC | Age: 34
End: 2024-09-30
Payer: COMMERCIAL

## 2024-09-30 ENCOUNTER — PATIENT MESSAGE (OUTPATIENT)
Dept: NEUROLOGY | Facility: CLINIC | Age: 34
End: 2024-09-30
Payer: COMMERCIAL

## 2024-09-30 VITALS
DIASTOLIC BLOOD PRESSURE: 58 MMHG | WEIGHT: 293 LBS | SYSTOLIC BLOOD PRESSURE: 144 MMHG | BODY MASS INDEX: 51.36 KG/M2 | TEMPERATURE: 99 F

## 2024-09-30 DIAGNOSIS — G56.01 RIGHT CARPAL TUNNEL SYNDROME: Primary | ICD-10-CM

## 2024-09-30 DIAGNOSIS — M79.641 RIGHT HAND PAIN: ICD-10-CM

## 2024-09-30 PROCEDURE — 73130 X-RAY EXAM OF HAND: CPT | Mod: TC,FY,RT

## 2024-09-30 PROCEDURE — 3077F SYST BP >= 140 MM HG: CPT | Mod: CPTII,S$GLB,,

## 2024-09-30 PROCEDURE — 1160F RVW MEDS BY RX/DR IN RCRD: CPT | Mod: CPTII,S$GLB,,

## 2024-09-30 PROCEDURE — 99214 OFFICE O/P EST MOD 30 MIN: CPT | Mod: 25,S$GLB,,

## 2024-09-30 PROCEDURE — 3044F HG A1C LEVEL LT 7.0%: CPT | Mod: CPTII,S$GLB,,

## 2024-09-30 PROCEDURE — 73130 X-RAY EXAM OF HAND: CPT | Mod: 26,RT,, | Performed by: RADIOLOGY

## 2024-09-30 PROCEDURE — 1159F MED LIST DOCD IN RCRD: CPT | Mod: CPTII,S$GLB,,

## 2024-09-30 PROCEDURE — 3078F DIAST BP <80 MM HG: CPT | Mod: CPTII,S$GLB,,

## 2024-09-30 PROCEDURE — 99999 PR PBB SHADOW E&M-EST. PATIENT-LVL III: CPT | Mod: PBBFAC,,,

## 2024-09-30 PROCEDURE — 20526 THER INJECTION CARP TUNNEL: CPT | Mod: RT,S$GLB,,

## 2024-09-30 PROCEDURE — 3008F BODY MASS INDEX DOCD: CPT | Mod: CPTII,S$GLB,,

## 2024-09-30 RX ORDER — LIDOCAINE HYDROCHLORIDE 10 MG/ML
1 INJECTION, SOLUTION INFILTRATION; PERINEURAL
Status: DISCONTINUED | OUTPATIENT
Start: 2024-09-30 | End: 2024-09-30 | Stop reason: HOSPADM

## 2024-09-30 RX ORDER — DEXAMETHASONE SODIUM PHOSPHATE 4 MG/ML
4 INJECTION, SOLUTION INTRA-ARTICULAR; INTRALESIONAL; INTRAMUSCULAR; INTRAVENOUS; SOFT TISSUE
Status: DISCONTINUED | OUTPATIENT
Start: 2024-09-30 | End: 2024-09-30 | Stop reason: HOSPADM

## 2024-09-30 RX ADMIN — LIDOCAINE HYDROCHLORIDE 1 ML: 10 INJECTION, SOLUTION INFILTRATION; PERINEURAL at 09:09

## 2024-09-30 RX ADMIN — DEXAMETHASONE SODIUM PHOSPHATE 4 MG: 4 INJECTION, SOLUTION INTRA-ARTICULAR; INTRALESIONAL; INTRAMUSCULAR; INTRAVENOUS; SOFT TISSUE at 09:09

## 2024-09-30 NOTE — PROGRESS NOTES
[facility]  History & Physical  Orthopedics    SUBJECTIVE:      Chief Complaint: right wrist hand pain     Referring Provider: No ref. provider found     Dr. Nathaniel Perdomo is the supervising physician for this encounter/patient    History of Present Illness:  Patient is a 34 y.o. right hand dominant female who presents today with complaints of right wrist/hand pain.  Patient reports she was previously diagnosed with right carpal tunnel in April 20, 2023 2 months after her pregnancy and was given a brace and Dosepak and her symptoms went away.  She reports recently having similar symptoms which began 2-3 weeks ago with intermittent shooting pain from her mid forearm to her right thumb with associated numbness right thumb index and middle finger.  She reports decreased pain strength in the right-hand.  States she wearing a wrist brace at work however has been causing her more pain and discomfort.  She denies any previous trauma for hand surgery.    The patient does a lot of typing.         The patient denies any fevers, chills, N/V, D/C and presents for evaluation.       Past Medical History:   Diagnosis Date    Abnormal Pap smear 1/3/2013    Allergy     Anxiety     Panic attacks      No past surgical history on file.  Review of patient's allergies indicates:  No Known Allergies  Social History     Social History Narrative    Not on file     Family History   Problem Relation Name Age of Onset    Hypertension Father      Diabetes Father      Heart attacks under age 50 Father      Hypertension Maternal Grandmother      Hypertension Paternal Grandmother      Hypertension Paternal Grandfather      Diabetes Paternal Grandfather      Heart attack Paternal Grandfather      Breast cancer Neg Hx      Ovarian cancer Neg Hx      Melanoma Neg Hx           Current Outpatient Medications:     buPROPion (WELLBUTRIN SR) 150 MG TBSR 12 hr tablet, Take 1 tablet (150 mg total) by mouth 2 (two) times daily., Disp: 180  tablet, Rfl: 1    citalopram (CELEXA) 20 MG tablet, Take 1 tablet (20 mg total) by mouth once daily., Disp: 30 tablet, Rfl: 11    Current Facility-Administered Medications:     etonogestreL subdermal device 68 mg, 68 mg, Implant, , Rose Cordova MD, 68 mg at 11/14/23 1430      Review of Systems:  Constitutional: no fever or chills  Eyes: no visual changes  ENT: no nasal congestion or sore throat  Respiratory: no cough or shortness of breath  Cardiovascular: no chest pain  Gastrointestinal: no nausea or vomiting, tolerating diet  Musculoskeletal: myalgias, pain, soreness, and numbness/tingling    OBJECTIVE:      Vital Signs (Most Recent):  There were no vitals filed for this visit.  There is no height or weight on file to calculate BMI.      Physical Exam:  Constitutional: The patient appears well-developed and well-nourished. No distress.   Skin: No lesions appreciated  Head: Normocephalic and atraumatic.   Nose: Nose normal.   Ears: No deformities seen  Eyes: Conjunctivae and EOM are normal.   Neck: No tracheal deviation present.   Cardiovascular: Normal rate and intact distal pulses.    Pulmonary/Chest: Effort normal. No respiratory distress.   Abdominal: There is no guarding.   Neurological: The patient is alert.   Psychiatric: The patient has a normal mood and affect.     Right Hand/Wrist Examination:    Observation/Inspection:  Swelling  none    Deformity  none  Discoloration  none     Scars   none    Atrophy  none    HAND/WRIST EXAMINATION:  Finkelstein's Test   Neg  WHAT Test    Neg  Snuff box tenderness   Neg  Camejo's Test    Neg  Hook of Hamate Tenderness  Neg  CMC grind    Neg  Circumduction test   Neg    Neurovascular Exam:  Digits WWP, brisk CR < 3s throughout  NVI motor/LTS to M/R/U nerves, radial pulse 2+  Tinel's Test - Carpal Tunnel  negative right  Tinel's Test - Cubital Tunnel  Neg  Phalen's Test    Neg  Median Nerve Compression Test positive right    ROM hand full, painless    ROM  wrist full, painless    ROM elbow full, painless    Abdomen not guarded  Respirations nonlabored  Perfusion intact    Diagnostic Results:     Imaging - I independently viewed the patient's imaging as well as the radiology report.  Xrays of the patient's right  hand demonstrate no evidence of any acute fractures or dislocations or significant degenerative changes.    EMG - none     ASSESSMENT/PLAN:      Carie was seen today for pain, numbness and tingling.    Diagnoses and all orders for this visit:    Right carpal tunnel syndrome  -     EMG W/ ULTRASOUND AND NERVE CONDUCTION TEST 2 Extremities; Future  -     WRIST BRACE FOR HOME USE  -     Carpal Tunnel  right CTS 1           34 y.o. yo female with acute right carpal tunnel  Plan: The patient and I had a thorough discussion today.  We discussed the working diagnosis as well as several other potential alternative diagnoses.  Treatment options were discussed, both conservative and surgical.  Conservative treatment options would include things such as activity modifications, workplace modifications, a period of rest, oral vs topical OTC and prescription anti-inflammatory medications, occupational therapy, splinting/bracing, immobilization, corticosteroid injections, and others.  Surgical options were discussed as well.     At this time, the patient would like to proceed with a trial of right carpal tunnel CSI, nighttime wrist brace, median nerve glide  Due to her previous history of right CTS we will order EMG study follow-up with Dr. Nathaniel hawkins to discuss further intervention    Should the patient's symptoms worsen, persist, or fail to improve they should return for reevaluation and I would be happy to see them back anytime.      Lily Coley NP  [facility], [facility]      Please do not hesitate to reach out to us via email, phone, or MyChart with any questions, concerns, or feedback.

## 2024-09-30 NOTE — PROCEDURES
Carpal Tunnel  right CTS 1    Date/Time: 9/30/2024 9:30 AM    Performed by: Lily Coley NP  Authorized by: Lily Coley NP    Consent Done?:  Yes (Verbal)  Indications:  Pain  Site marked: the procedure site was marked    Timeout: prior to procedure the correct patient, procedure, and site was verified    Prep: patient was prepped and draped in usual sterile fashion      Local anesthetic:  Lidocaine 1% without epinephrine  Anesthetic total (ml):  1    Location:  Wrist  Ultrasonic Guidance for Needle Placement?: No    Needle size:  25 G  Approach:  Volar  Medications:  4 mg dexAMETHasone 4 mg/mL; 1 mL LIDOcaine HCL 10 mg/ml (1%) 10 mg/mL (1 %)  Patient tolerance:  Patient tolerated the procedure well with no immediate complications

## 2024-10-15 ENCOUNTER — PATIENT MESSAGE (OUTPATIENT)
Dept: PRIMARY CARE CLINIC | Facility: CLINIC | Age: 34
End: 2024-10-15
Payer: COMMERCIAL

## 2024-10-15 ENCOUNTER — PATIENT MESSAGE (OUTPATIENT)
Dept: OBSTETRICS AND GYNECOLOGY | Facility: CLINIC | Age: 34
End: 2024-10-15
Payer: COMMERCIAL

## 2024-10-15 DIAGNOSIS — F41.9 ANXIETY: Primary | ICD-10-CM

## 2024-10-16 RX ORDER — CITALOPRAM 20 MG/1
20 TABLET, FILM COATED ORAL DAILY
Qty: 30 TABLET | Refills: 5 | Status: SHIPPED | OUTPATIENT
Start: 2024-10-16 | End: 2025-10-16

## 2024-10-17 ENCOUNTER — PATIENT MESSAGE (OUTPATIENT)
Dept: PRIMARY CARE CLINIC | Facility: CLINIC | Age: 34
End: 2024-10-17

## 2024-10-17 ENCOUNTER — PATIENT MESSAGE (OUTPATIENT)
Dept: ORTHOPEDICS | Facility: CLINIC | Age: 34
End: 2024-10-17
Payer: COMMERCIAL

## 2024-10-17 ENCOUNTER — OFFICE VISIT (OUTPATIENT)
Dept: PRIMARY CARE CLINIC | Facility: CLINIC | Age: 34
End: 2024-10-17
Payer: COMMERCIAL

## 2024-10-17 DIAGNOSIS — F41.9 ANXIETY: Primary | ICD-10-CM

## 2024-10-17 DIAGNOSIS — Z13.39 ADHD (ATTENTION DEFICIT HYPERACTIVITY DISORDER) EVALUATION: ICD-10-CM

## 2024-10-17 PROCEDURE — 99213 OFFICE O/P EST LOW 20 MIN: CPT | Mod: 95,,, | Performed by: STUDENT IN AN ORGANIZED HEALTH CARE EDUCATION/TRAINING PROGRAM

## 2024-10-17 PROCEDURE — 3044F HG A1C LEVEL LT 7.0%: CPT | Mod: CPTII,95,, | Performed by: STUDENT IN AN ORGANIZED HEALTH CARE EDUCATION/TRAINING PROGRAM

## 2024-10-17 NOTE — PROGRESS NOTES
Telehealth Visit    The patient location is: [place]   The chief complaint leading to consultation is: Anxiety    Visit type: audiovisual    Face to Face time with patient: 15 minutes  20 minutes of total time spent on the encounter, which includes face to face time and non-face to face time preparing to see the patient (eg, review of tests), Obtaining and/or reviewing separately obtained history, Documenting clinical information in the electronic or other health record, Independently interpreting results (not separately reported) and communicating results to the patient/family/caregiver, or Care coordination (not separately reported).       HPI    Patient is a 34 y.o.   Carie Estrada  has a past medical history of Abnormal Pap smear (1/3/2013), Allergy, Anxiety, and Panic attacks.    Patient presenting for referral for ADHD evaluation   She reports inability to focus, meet deadlines, and feeling overwhelmed.   She recently started a doctorate program and suspect this combined with working and motherhood are contributing to burnout.   She has been experiencing insomnia, crying, and panic attacks. She resumed Celexa on 10/16 which was prescribed by her gynecologist. She plans to establish care with a therapist. She plans to reach out if considering taking time from work.             Active Medications:  Current Outpatient Medications   Medication Instructions    buPROPion (WELLBUTRIN SR) 150 mg, Oral, 2 times daily    citalopram (CELEXA) 20 mg, Oral, Daily       Physical Exam    General: Does not appear to be in acute distress    Assessment and Plan     1. Anxiety  Comments:  F/u prn    2. ADHD (attention deficit hyperactivity disorder) evaluation  -     Ambulatory referral/consult to Psychiatry; Future; Expected date: 10/30/2024                 Upcoming Scheduled Appointments and Follow Up:    Future Appointments   Date Time Provider Department Center   11/5/2024  8:00 AM Teddy Austin MD [facility] [place]   1/14/2025  9:00 AM Rose Cordova MD [facility]       Follow Up [facility] (with who? when?): No follow-ups on file.        Extended Emergency Contact Information  Primary Emergency Contact: Kim Estrada  Address: [address] [place] [address]  Mobile Phone: [phone]  Relation: Spouse  Secondary Emergency Contact: Dipesh Donnelly   [place]  Mobile Phone: [phone]  Relation: Mother      Teddy Austin MD   Internal Medicine  10/23/2024 - 11:03 AM        Each patient to whom he or she provides medical services by telemedicine is:  (1) informed of the relationship between the physician and patient and the respective role of any other health care provider with respect to management of the patient; and (2) notified that he or she may decline to receive medical services by telemedicine and may withdraw from such care at any time.    While patients have the right to access their medical record, it is essential to recognize that progress notes primarily serve as a means of communication among healthcare professionals.

## 2024-11-05 ENCOUNTER — OFFICE VISIT (OUTPATIENT)
Dept: PRIMARY CARE CLINIC | Facility: CLINIC | Age: 34
End: 2024-11-05
Payer: COMMERCIAL

## 2024-11-05 VITALS
OXYGEN SATURATION: 99 % | HEART RATE: 105 BPM | BODY MASS INDEX: 48.82 KG/M2 | TEMPERATURE: 98 F | DIASTOLIC BLOOD PRESSURE: 98 MMHG | WEIGHT: 293 LBS | SYSTOLIC BLOOD PRESSURE: 145 MMHG | HEIGHT: 65 IN

## 2024-11-05 DIAGNOSIS — E66.01 CLASS 3 SEVERE OBESITY DUE TO EXCESS CALORIES WITH SERIOUS COMORBIDITY AND BODY MASS INDEX (BMI) OF 45.0 TO 49.9 IN ADULT: ICD-10-CM

## 2024-11-05 DIAGNOSIS — E66.813 CLASS 3 SEVERE OBESITY DUE TO EXCESS CALORIES WITH SERIOUS COMORBIDITY AND BODY MASS INDEX (BMI) OF 45.0 TO 49.9 IN ADULT: ICD-10-CM

## 2024-11-05 DIAGNOSIS — F41.9 ANXIETY: Primary | ICD-10-CM

## 2024-11-05 PROCEDURE — 99999 PR PBB SHADOW E&M-EST. PATIENT-LVL III: CPT | Mod: PBBFAC,,, | Performed by: STUDENT IN AN ORGANIZED HEALTH CARE EDUCATION/TRAINING PROGRAM

## 2024-11-05 RX ORDER — NIFEDIPINE 60 MG/1
60 TABLET, EXTENDED RELEASE ORAL DAILY
Qty: 90 TABLET | Refills: 0 | Status: SHIPPED | OUTPATIENT
Start: 2024-11-05

## 2024-11-05 NOTE — PROGRESS NOTES
"  Primary Care  Office Visit - In Person  11/5/2024      HPI    Patient is a 34 y.o.   Carie Estrada  has a past medical history of Abnormal Pap smear (1/3/2013), Allergy, Anxiety, and Panic attacks.    History of Present Illness    CHIEF COMPLAINT:  Patient presents today for follow up.    BLOOD PRESSURE:  She is not currently taking any blood pressure medication and denies monitoring her blood pressure at home.                 Active Medications:  Current Outpatient Medications   Medication Instructions    buPROPion (WELLBUTRIN SR) 150 mg, Oral, 2 times daily    citalopram (CELEXA) 20 mg, Oral, Daily    NIFEdipine (PROCARDIA-XL) 60 mg, Oral, Daily       Vitals:    11/05/24 0811 11/05/24 0833   BP: (!) 136/100 (!) 145/98   BP Location: Right arm Left arm   Patient Position: Sitting Sitting   Pulse: 105    Temp: 98.3 °F (36.8 °C)    SpO2: 99%    Weight: (!) 142 kg (313 lb 0.9 oz)    Height: 5' 5" (1.651 m)        Physical Exam  Vitals reviewed.   Constitutional:       General: She is not in acute distress.  Cardiovascular:      Rate and Rhythm: Normal rate and regular rhythm.      Pulses: Normal pulses.      Heart sounds: Normal heart sounds.   Pulmonary:      Effort: Pulmonary effort is normal.      Breath sounds: Normal breath sounds.   Abdominal:      General: Abdomen is flat. Bowel sounds are normal.      Palpations: Abdomen is soft.   Musculoskeletal:      Right lower leg: No edema.      Left lower leg: No edema.   Neurological:      General: No focal deficit present.      Mental Status: She is oriented to person, place, and time.            Assessment & Plan      HYPERTENSION:  - Resume nifedipine 60 mg daily   - Patient to monitor blood pressure at home.  - Follow up in 3 months for blood pressure reassessment.    CLASS 3 OBESITY:   - Continue Wellbutrin  mg BID with understanding that it may be contributing to HTN           Orders Placed This Encounter    NIFEdipine (PROCARDIA-XL) 60 MG " (OS[facility]) 24 hr tablet         Previous labs, records, and notes reviewed and considered for their impact on clinical decision making today.                Upcoming Scheduled Appointments and Follow Up:    Future Appointments   Date Time Provider Department Center   1/14/2025  9:00 AM Rose Cordova MD [facility]       Follow Up [facility] (with who? when?): Follow up in about 3 months (around 2/5/2025) for nursing bp check .      Extended Emergency Contact Information  Primary Emergency Contact: Kim Estrada  Address: [address] [place] [address]  Mobile Phone: [phone]  Relation: Spouse  Secondary Emergency Contact: Dipesh Donnelly   [place]  Mobile Phone: [phone]  Relation: Mother      Teddy Austin MD   Internal Medicine  11/5/2024 - 9:02 AM    I spent a total of 30 minutes on the day of the visit.This includes face to face time and non-face to face time preparing to see the patient (eg, review of tests), obtaining and/or reviewing separately obtained history, documenting clinical information in the electronic or other health record, independently interpreting results and communicating results to the patient/family/caregiver, or care coordinator.    This note was generated with the assistance of ambient listening technology. Verbal consent was obtained by the patient and accompanying visitor(s) for the recording of patient appointment to facilitate this note. I attest to having reviewed and edited the generated note for accuracy, though some syntax or spelling errors may persist. Please contact the author of this note for any clarification.      While patients have the right to access their medical record, it is essential to recognize that progress notes primarily serve as a means of communication among healthcare professionals.

## 2025-01-09 ENCOUNTER — PATIENT MESSAGE (OUTPATIENT)
Dept: ADMINISTRATIVE | Facility: HOSPITAL | Age: 35
End: 2025-01-09
Payer: COMMERCIAL

## 2025-01-14 ENCOUNTER — OFFICE VISIT (OUTPATIENT)
Dept: OBSTETRICS AND GYNECOLOGY | Facility: CLINIC | Age: 35
End: 2025-01-14
Payer: COMMERCIAL

## 2025-01-14 ENCOUNTER — PATIENT MESSAGE (OUTPATIENT)
Dept: OBSTETRICS AND GYNECOLOGY | Facility: CLINIC | Age: 35
End: 2025-01-14

## 2025-01-14 VITALS
BODY MASS INDEX: 48.82 KG/M2 | DIASTOLIC BLOOD PRESSURE: 96 MMHG | WEIGHT: 293 LBS | SYSTOLIC BLOOD PRESSURE: 138 MMHG | HEIGHT: 65 IN

## 2025-01-14 DIAGNOSIS — F41.9 ANXIETY: ICD-10-CM

## 2025-01-14 DIAGNOSIS — Z11.51 ENCOUNTER FOR SCREENING FOR HUMAN PAPILLOMAVIRUS (HPV): ICD-10-CM

## 2025-01-14 DIAGNOSIS — Z11.3 SCREEN FOR STD (SEXUALLY TRANSMITTED DISEASE): ICD-10-CM

## 2025-01-14 DIAGNOSIS — Z12.4 CERVICAL CANCER SCREENING: Primary | ICD-10-CM

## 2025-01-14 PROCEDURE — 1159F MED LIST DOCD IN RCRD: CPT | Mod: CPTII,S$GLB,, | Performed by: OBSTETRICS & GYNECOLOGY

## 2025-01-14 PROCEDURE — 99395 PREV VISIT EST AGE 18-39: CPT | Mod: S$GLB,,, | Performed by: OBSTETRICS & GYNECOLOGY

## 2025-01-14 PROCEDURE — 87624 HPV HI-RISK TYP POOLED RSLT: CPT | Performed by: OBSTETRICS & GYNECOLOGY

## 2025-01-14 PROCEDURE — 3080F DIAST BP >= 90 MM HG: CPT | Mod: CPTII,S$GLB,, | Performed by: OBSTETRICS & GYNECOLOGY

## 2025-01-14 PROCEDURE — 3008F BODY MASS INDEX DOCD: CPT | Mod: CPTII,S$GLB,, | Performed by: OBSTETRICS & GYNECOLOGY

## 2025-01-14 PROCEDURE — 3075F SYST BP GE 130 - 139MM HG: CPT | Mod: CPTII,S$GLB,, | Performed by: OBSTETRICS & GYNECOLOGY

## 2025-01-14 PROCEDURE — 99999 PR PBB SHADOW E&M-EST. PATIENT-LVL III: CPT | Mod: PBBFAC,,, | Performed by: OBSTETRICS & GYNECOLOGY

## 2025-01-14 PROCEDURE — 88175 CYTOPATH C/V AUTO FLUID REDO: CPT | Performed by: OBSTETRICS & GYNECOLOGY

## 2025-01-14 PROCEDURE — 1160F RVW MEDS BY RX/DR IN RCRD: CPT | Mod: CPTII,S$GLB,, | Performed by: OBSTETRICS & GYNECOLOGY

## 2025-01-14 PROCEDURE — 87591 N.GONORRHOEAE DNA AMP PROB: CPT | Performed by: OBSTETRICS & GYNECOLOGY

## 2025-01-14 NOTE — PROGRESS NOTES
"Chief Complaint: Well Woman Exam     HPI:      Carie Estrada is a 34 y.o.  who presents today for well woman exam.  LMP: Patient's last menstrual period was 2025 (approximate).  No issues, problems, or complaints. Specifically, patient denies abnormal vaginal bleeding, discharge, pelvic pain, urinary problems, or changes in appetite. Ms. Estrada is currently sexually active with a single male partner. She is currently using Nexplanon inserted in  for contraception. She would like STD screening today.    Previous Pap: NILM, HPV negative (2025)  T-C Score:     Gardasil:has never had     Ms. Estrada confirms that she wears her seatbelt when riding in the car and does not text while driving.     OB History          1    Para   1    Term   1            AB        Living   1         SAB        IAB        Ectopic        Multiple   0    Live Births   1               ROS:     GENERAL: Denies unintentional weight gain or weight loss. Feeling well overall.   SKIN: Denies rash or lesions.   HEENT: Denies headaches, or vision changes.   CARDIOVASCULAR: Denies palpitations or chest pain.   RESPIRATORY: Denies shortness of breath or dyspnea on exertion.  BREASTS: Denies lumps or nipple discharge.   ABDOMEN: Denies constipation, diarrhea, nausea, vomiting, change in appetite.  URINARY: Denies frequency, dysuria.  NEUROLOGIC: Denies syncope or weakness.   PSYCHIATRIC: Denies uncontrolled depression or anxiety.    Physical Exam:      Physical Exam     BP (!) 138/96 (Patient Position: Sitting)   Ht 5' 5" (1.651 m)   Wt (!) 142.4 kg (313 lb 13.2 oz)   LMP 2025 (Approximate)   Breastfeeding No   BMI 52.22 kg/m²   Body mass index is 52.22 kg/m².     APPEARANCE: Well nourished, well developed, in no acute distress.  PSYCH: Appropriate mood and affect.  SKIN: No acne or hirsutism  NECK: Neck symmetric without masses  NODES: No inguinal, axillary, or supraclavicular lymph node " enlargement  ABDOMEN: Soft.  No tenderness or masses.    CARDIOVASCULAR: No edema of peripheral extremities  BREASTS: Symmetrical, no visible skin lesions. No palpable masses. No nipple discharge bilaterally.  PELVIC: Normal external genitalia without lesions.  Normal hair distribution.  Adequate perineal body, normal urethral meatus.  Vagina moist and well rugated. Without lesions. Vagina without abnormal discharge. Small amount of menstrual blood present. Cervix without lesions, abnormal discharge, or tenderness.. No significant cystocele or rectocele.  Bimanual exam limited by habitus.      A female chaperone was present for the breast and pelvic exam.     Assessment/Plan:     Cervical cancer screening  -     Liquid-Based Pap Smear, Screening    Encounter for screening for human papillomavirus (HPV)  -     HPV High Risk Genotypes, PCR    Anxiety  -     Ambulatory referral/consult to Psychiatry; Future; Expected date: 01/21/2025    Screen for STD (sexually transmitted disease)  -     C. trachomatis/N. gonorrhoeae by AMP DNA [facility]; Vagina        Follow up in about 1 year (around 1/14/2026) for Annual Exam.    Counseling:     Patient was counseled today on current ASCCP pap guidelines, the recommendation for yearly physical exams, safe driving habits, and breast self awareness. She is to see her PCP for other health maintenance.     Use of the Real [facility] Patient Portal discussed and encouraged during today's visit.

## 2025-01-15 LAB
C TRACH DNA SPEC QL NAA+PROBE: NOT DETECTED
N GONORRHOEA DNA SPEC QL NAA+PROBE: NOT DETECTED

## 2025-01-17 ENCOUNTER — PATIENT MESSAGE (OUTPATIENT)
Dept: OBSTETRICS AND GYNECOLOGY | Facility: CLINIC | Age: 35
End: 2025-01-17
Payer: COMMERCIAL

## 2025-01-25 LAB
FINAL PATHOLOGIC DIAGNOSIS: NORMAL
Lab: NORMAL

## 2025-01-28 LAB
HPV HR 12 DNA SPEC QL NAA+PROBE: NEGATIVE
HPV16 AG SPEC QL: NEGATIVE
HPV18 DNA SPEC QL NAA+PROBE: NEGATIVE

## 2025-01-31 ENCOUNTER — OFFICE VISIT (OUTPATIENT)
Dept: PSYCHIATRY | Facility: CLINIC | Age: 35
End: 2025-01-31
Payer: COMMERCIAL

## 2025-01-31 ENCOUNTER — PATIENT MESSAGE (OUTPATIENT)
Dept: PRIMARY CARE CLINIC | Facility: CLINIC | Age: 35
End: 2025-01-31
Payer: COMMERCIAL

## 2025-01-31 VITALS
SYSTOLIC BLOOD PRESSURE: 161 MMHG | HEIGHT: 65 IN | BODY MASS INDEX: 48.82 KG/M2 | HEART RATE: 77 BPM | DIASTOLIC BLOOD PRESSURE: 99 MMHG | WEIGHT: 293 LBS

## 2025-01-31 DIAGNOSIS — F32.A DEPRESSION, UNSPECIFIED DEPRESSION TYPE: Primary | ICD-10-CM

## 2025-01-31 DIAGNOSIS — F41.1 GENERALIZED ANXIETY DISORDER: ICD-10-CM

## 2025-01-31 PROCEDURE — 99205 OFFICE O/P NEW HI 60 MIN: CPT | Mod: S$GLB,,,

## 2025-01-31 PROCEDURE — 1160F RVW MEDS BY RX/DR IN RCRD: CPT | Mod: CPTII,S$GLB,,

## 2025-01-31 PROCEDURE — 99417 PROLNG OP E/M EACH 15 MIN: CPT | Mod: S$GLB,,,

## 2025-01-31 PROCEDURE — 3080F DIAST BP >= 90 MM HG: CPT | Mod: CPTII,S$GLB,,

## 2025-01-31 PROCEDURE — 3077F SYST BP >= 140 MM HG: CPT | Mod: CPTII,S$GLB,,

## 2025-01-31 PROCEDURE — 99999 PR PBB SHADOW E&M-EST. PATIENT-LVL III: CPT | Mod: PBBFAC,,,

## 2025-01-31 PROCEDURE — 1159F MED LIST DOCD IN RCRD: CPT | Mod: CPTII,S$GLB,,

## 2025-01-31 PROCEDURE — 3008F BODY MASS INDEX DOCD: CPT | Mod: CPTII,S$GLB,,

## 2025-01-31 RX ORDER — ESCITALOPRAM OXALATE 20 MG/1
20 TABLET ORAL DAILY
Qty: 30 TABLET | Refills: 1 | Status: SHIPPED | OUTPATIENT
Start: 2025-01-31 | End: 2025-04-01
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
## 2025-02-05 ENCOUNTER — LAB VISIT (OUTPATIENT)
Dept: LAB | Facility: OTHER | Age: 35
End: 2025-02-05
Attending: STUDENT IN AN ORGANIZED HEALTH CARE EDUCATION/TRAINING PROGRAM
Payer: COMMERCIAL

## 2025-02-05 DIAGNOSIS — R74.01 TRANSAMINITIS: ICD-10-CM

## 2025-02-05 LAB
ALBUMIN SERPL BCP-MCNC: 4.1 G/DL (ref 3.5–5.2)
ALP SERPL-CCNC: 75 U/L (ref 40–150)
ALT SERPL W/O P-5'-P-CCNC: 31 U/L (ref 10–44)
AST SERPL-CCNC: 24 U/L (ref 10–40)
BILIRUB DIRECT SERPL-MCNC: 0.1 MG/DL (ref 0.1–0.3)
BILIRUB SERPL-MCNC: 0.2 MG/DL (ref 0.1–1)
PROT SERPL-MCNC: 7.5 G/DL (ref 6–8.4)

## 2025-02-05 PROCEDURE — 36415 COLL VENOUS BLD VENIPUNCTURE: CPT | Performed by: STUDENT IN AN ORGANIZED HEALTH CARE EDUCATION/TRAINING PROGRAM

## 2025-02-05 PROCEDURE — 80076 HEPATIC FUNCTION PANEL: CPT | Performed by: STUDENT IN AN ORGANIZED HEALTH CARE EDUCATION/TRAINING PROGRAM

## 2025-02-06 ENCOUNTER — PATIENT MESSAGE (OUTPATIENT)
Dept: PRIMARY CARE CLINIC | Facility: CLINIC | Age: 35
End: 2025-02-06
Payer: COMMERCIAL

## 2025-02-07 ENCOUNTER — TELEPHONE (OUTPATIENT)
Dept: ADMINISTRATIVE | Facility: OTHER | Age: 35
End: 2025-02-07
Payer: COMMERCIAL

## 2025-02-07 ENCOUNTER — OFFICE VISIT (OUTPATIENT)
Dept: OBSTETRICS AND GYNECOLOGY | Facility: CLINIC | Age: 35
End: 2025-02-07
Payer: COMMERCIAL

## 2025-02-07 ENCOUNTER — LAB VISIT (OUTPATIENT)
Dept: LAB | Facility: HOSPITAL | Age: 35
End: 2025-02-07
Attending: STUDENT IN AN ORGANIZED HEALTH CARE EDUCATION/TRAINING PROGRAM
Payer: COMMERCIAL

## 2025-02-07 DIAGNOSIS — R63.5 WEIGHT GAIN: ICD-10-CM

## 2025-02-07 DIAGNOSIS — Z83.3 FAMILY HISTORY OF DIABETES MELLITUS: ICD-10-CM

## 2025-02-07 DIAGNOSIS — R63.5 WEIGHT GAIN: Primary | ICD-10-CM

## 2025-02-07 LAB
ESTIMATED AVG GLUCOSE: 111 MG/DL (ref 68–131)
HBA1C MFR BLD: 5.5 % (ref 4–5.6)

## 2025-02-07 PROCEDURE — 36415 COLL VENOUS BLD VENIPUNCTURE: CPT | Performed by: OBSTETRICS & GYNECOLOGY

## 2025-02-07 PROCEDURE — 98005 SYNCH AUDIO-VIDEO EST LOW 20: CPT | Mod: 95,,, | Performed by: OBSTETRICS & GYNECOLOGY

## 2025-02-07 PROCEDURE — 1160F RVW MEDS BY RX/DR IN RCRD: CPT | Mod: CPTII,95,, | Performed by: OBSTETRICS & GYNECOLOGY

## 2025-02-07 PROCEDURE — 1159F MED LIST DOCD IN RCRD: CPT | Mod: CPTII,95,, | Performed by: OBSTETRICS & GYNECOLOGY

## 2025-02-07 PROCEDURE — 83036 HEMOGLOBIN GLYCOSYLATED A1C: CPT | Performed by: OBSTETRICS & GYNECOLOGY

## 2025-02-07 NOTE — TELEPHONE ENCOUNTER
SHELL with scheduled Nutrition appointment of 3-7-2025, and contact number provided for any questions. My Chart message to be sent.

## 2025-02-07 NOTE — PROGRESS NOTES
The patient location is: Work  The chief complaint leading to consultation is: Weight Gain    Visit type: audiovisual    Face to Face time with patient: 16 minutes  20 minutes of total time spent on the encounter, which includes face to face time and non-face to face time preparing to see the patient (eg, review of tests), Obtaining and/or reviewing separately obtained history, Documenting clinical information in the electronic or other health record, Independently interpreting results (not separately reported) and communicating results to the patient/family/caregiver, or Care coordination (not separately reported).         Each patient to whom he or she provides medical services by telemedicine is:  (1) informed of the relationship between the physician and patient and the respective role of any other health care provider with respect to management of the patient; and (2) notified that he or she may decline to receive medical services by telemedicine and may withdraw from such care at any time.    Notes:     Chief Complaint: Weight Gain     HPI:      Carie Estrada is a 34 y.o.  who presents today to discuss weight gain. She feels that she has been unable to lose weight since the birth of her child in 2023. Pt had a Nexplanon placed in 2023 and feels this has contributed to her inability to lose weight. Weight at beginning of pregnancy in 2022 was 123 kg. At initial post partum visit patient's weight was 111 kg. At time of Nexplanon placement weight was 134 kg. Weight at her annual last month was 142 kg.      Pt discussed pre-daibetes with her PCP. She was placed on Wellbutrin to try to help manage weight, but she says this hasn't helped. Stopped Wellbutrin about 1 month ago. No change in appetite, weight, or energy level while on the medication.  Her father has DM and her mother just got dx'd with pre-diabetes so she is worried about long term health.     Pt had been on Cellexa x 10 years.  Pt switched to a higher dose of Lexapro and is worried that that is also contributing to weight gain.      Physical Exam:     APPEARANCE: Well nourished, well developed, in no acute distress.    Assessment/Plan:     Weight gain  -     HEMOGLOBIN A1C; Future; Expected date: 02/07/2025  -     Ambulatory referral/consult to Nutrition Services; Future; Expected date: 02/14/2025  -     Ambulatory referral/consult to Weight Management Program; Future; Expected date: 02/14/2025    Family history of diabetes mellitus  -     Ambulatory referral/consult to Nutrition Services; Future; Expected date: 02/14/2025  -     Ambulatory referral/consult to Weight Management Program; Future; Expected date: 02/14/2025

## 2025-02-21 RX ORDER — NIFEDIPINE 60 MG/1
60 TABLET, EXTENDED RELEASE ORAL
Qty: 90 TABLET | Refills: 0 | Status: SHIPPED | OUTPATIENT
Start: 2025-02-21

## 2025-02-21 NOTE — TELEPHONE ENCOUNTER
No care due was identified.  [facility] Embedded Care Due Messages. Reference number: [id number].   2/21/2025 12:17:14 AM CST

## 2025-03-07 ENCOUNTER — OFFICE VISIT (OUTPATIENT)
Dept: PSYCHIATRY | Facility: CLINIC | Age: 35
End: 2025-03-07
Payer: COMMERCIAL

## 2025-03-07 VITALS
WEIGHT: 293 LBS | HEART RATE: 103 BPM | SYSTOLIC BLOOD PRESSURE: 140 MMHG | DIASTOLIC BLOOD PRESSURE: 83 MMHG | BODY MASS INDEX: 51.71 KG/M2

## 2025-03-07 DIAGNOSIS — I10 HYPERTENSION, UNSPECIFIED TYPE: ICD-10-CM

## 2025-03-07 DIAGNOSIS — F41.1 GENERALIZED ANXIETY DISORDER: Primary | ICD-10-CM

## 2025-03-07 DIAGNOSIS — F32.A DEPRESSION, UNSPECIFIED DEPRESSION TYPE: ICD-10-CM

## 2025-03-07 PROCEDURE — 99999 PR PBB SHADOW E&M-EST. PATIENT-LVL III: CPT | Mod: PBBFAC,,,

## 2025-03-07 NOTE — PROGRESS NOTES
OUTPATIENT PSYCHIATRY FOLLOW-UP VISIT    ENCOUNTER DATE:  3/7/2025  SITE:  [facility]  LENGTH OF SESSION:  23 minutes        CHIEF COMPLAINT:   Anxiety, Inattention      HISTORY OF PRESENTING ILLNESS:  Carie Estrada is a 34 y.o. female with history of NANCY, depression, and HTN who presents for follow-up assessment. Last seen in clinic on 1/31/2025 for an initial visit. Plan at that visit was to d/c celexa 20 mg and start lexapro 20 mg daily.      History as told by Patient - & or family/friend/spouse/caregiver with pts permission    Pt. reports significant improvement in anxiety control since initiating lexapro. Denies recent panic attacks. Expresses concern regarding persistent difficulties with focus. Patient reports interest in resuming ADHD medication due to difficulties with focus at work, citing missed deadlines and inability to complete tasks. Discussed prior ADHD diagnosis and past stimulant treatment. Currently on antihypertensive medications.     No reported adverse effects from lexapro, taking daily as prescribed. Missed doses for 2 days while traveling, experienced transient increase in anxiety, which resolved upon resumption of medication. She denied SI/HI/AVH.         Risk Parameters:  Patient reports no suicidal ideation  Patient reports no homicidal ideation  Patient reports no self-injurious behavior  Patient reports no violent behavior    PSYCHIATRIC MED REVIEW    Current psych meds  Medication side effects:  None  Medication compliance: Yes    Previous psych meds trials  Celexa only      MEDICAL REVIEW OF SYSTEMS:  Complete review of systems performed covering Constitutional, Cardiovascular, Respiratory, Gastrointestinal, Musculoskeletal, Skin, Neurologic and Endocrine  All systems negative.    MEDICAL HISTORY:  Past Medical History:   Diagnosis Date    Abnormal Pap smear 1/3/2013    Allergy     Anxiety     Panic attacks        ALL MEDICATIONS:    Current  "Outpatient Medications:     EScitalopram oxalate (LEXAPRO) 20 MG tablet, Take 1 tablet (20 mg total) by mouth once daily., Disp: 30 tablet, Rfl: 1    NIFEdipine (PROCARDIA-XL) 60 MG (OSM) 24 hr tablet, TAKE 1 TABLET BY MOUTH ONCE DAILY, Disp: 90 tablet, Rfl: 0    Current Facility-Administered Medications:     etonogestreL subdermal device 68 mg, 68 mg, Implant, , Rose Cordova MD, 68 mg at 11/14/23 1430    ALLERGIES:  Review of patient's allergies indicates:  No Known Allergies    RELEVANT LABS/STUDIES:    Lab Results   Component Value Date    WBC 3.56 (L) 02/16/2024    HGB 12.6 02/16/2024    HCT 39.8 02/16/2024    MCV 90 02/16/2024     02/16/2024     BMP  Lab Results   Component Value Date     02/16/2024    K 4.8 02/16/2024     02/16/2024    CO2 23 02/16/2024    BUN 9 02/16/2024    CREATININE 0.8 02/16/2024    CALCIUM 9.4 02/16/2024    ANIONGAP 8 02/16/2024    ESTGFRAFRICA >60.0 04/01/2019    EGFRNONAA >60.0 04/01/2019     Lab Results   Component Value Date    ALT 31 02/05/2025    AST 24 02/05/2025    ALKPHOS 75 02/05/2025    BILITOT 0.2 02/05/2025     Lab Results   Component Value Date    TSH 1.109 02/16/2024     Lab Results   Component Value Date    HGBA1C 5.5 02/07/2025         VITALS  Vitals:    03/07/25 0821   BP: (!) 140/83   Pulse: 103   Weight: (!) 140.9 kg (310 lb 11.8 oz)       PHYSICAL EXAM  General: well developed, well nourished  Neurologic:   Gait: Normal   Psychomotor signs:  No involuntary movements or tremor  AIMS: none    PSYCHIATRIC EXAM:    Mental Status Exam:  Appearance: unremarkable, age appropriate, nodistress  Behavior/Cooperation: appropriate normal, cooperative  Speech:  normal tone, normal rate, normal pitch, normal volume  Language: uses words appropriately; NO aphasia or dysarthria  Mood:  "good"  Affect:  Full, appropriate  Thought Process: normal and logical  Thought Content: normal, no suicidality, no homicidality, delusions, or paranoia  Level of " Consciousness: Alert and Oriented x3  Memory:  Intact    Recent:  Intact; able to report recent events   Remote:   Intact   Attention/concentration: appropriate for age/education.   Fund of Knowledge: appears adequate  Insight:  Intact  Judgment:  Intact       IMPRESSION:    Carie Estrada is a 34 y.o. female with history of NANCY, depression, and HTN who presents for follow-up assessment. Last seen in clinic on 1/31/2025 for an initial visit. Plan at that visit was to d/c celexa 20 mg and start lexapro 20 mg daily. She reports that anxiety is under control but she still has trouble with focus and with missing deadlines. She has a history of ADHD and was previously on medications as a child. Previous history of cardiovascular complications as well as abnormal EKG in 2023 which led to an admission for further work-up. This may contraindicate stimulant use. Plan to assess risk-benefit ratio and consider alternative treatment options if necessary.     DIAGNOSES:    ICD-10-CM ICD-9-CM   1. Generalized anxiety disorder  F41.1 300.02   2. Depression, unspecified depression type  F32.A 311   3. Hypertension, unspecified type  I10 401.9         PLAN:  Psych Med:  EKG ordered due to hx of abnormal EKG in April 2023   Patient instructed to obtain medical clearance from PCP prior to initiating stimulant therapy. If not medically cleared, plan to trial non-stimulant alternatives  Continue lexapro 20 mg daily    Continue psychotherapy      Discussed with patient informed consent, risks vs. benefits, alternative treatments, side effect profile and the inherent unpredictability of individual responses to these treatments. Answered any questions patient may have had. The patient expresses understanding of the above and displays the capacity to agree with this current plan     Other: F/u with PCP for healthcare maintenance, HTN, and hepatic panel lab     Instructions:  Take all medications as prescribed.    Abstain from  recreational drugs and alcohol.  Present to ED or call 911 for SI/HI plan or intent, psychosis, or medical emergency.      Case to be discussed with supervising staff: Richard Benson MD    Total of 23 minutes spent today on encounter, including chart review,  review, seeing patient, documenting encounter, ordering medications.     RETURN TO CLINIC: 1 month     Inocencio Rivera MD  [facility] PGY-III

## 2025-03-19 ENCOUNTER — OFFICE VISIT (OUTPATIENT)
Dept: PRIMARY CARE CLINIC | Facility: CLINIC | Age: 35
End: 2025-03-19
Payer: COMMERCIAL

## 2025-03-19 VITALS
WEIGHT: 293 LBS | OXYGEN SATURATION: 100 % | TEMPERATURE: 98 F | BODY MASS INDEX: 48.82 KG/M2 | HEIGHT: 65 IN | RESPIRATION RATE: 20 BRPM | HEART RATE: 106 BPM | SYSTOLIC BLOOD PRESSURE: 140 MMHG | DIASTOLIC BLOOD PRESSURE: 84 MMHG

## 2025-03-19 DIAGNOSIS — E66.813 CLASS 3 SEVERE OBESITY DUE TO EXCESS CALORIES WITH SERIOUS COMORBIDITY AND BODY MASS INDEX (BMI) OF 45.0 TO 49.9 IN ADULT: ICD-10-CM

## 2025-03-19 DIAGNOSIS — E66.01 CLASS 3 SEVERE OBESITY DUE TO EXCESS CALORIES WITH SERIOUS COMORBIDITY AND BODY MASS INDEX (BMI) OF 45.0 TO 49.9 IN ADULT: ICD-10-CM

## 2025-03-19 DIAGNOSIS — F32.1 MAJOR DEPRESSIVE DISORDER, SINGLE EPISODE, MODERATE: ICD-10-CM

## 2025-03-19 DIAGNOSIS — R07.89 CHEST PRESSURE: Primary | ICD-10-CM

## 2025-03-19 DIAGNOSIS — F41.9 ANXIETY: ICD-10-CM

## 2025-03-19 DIAGNOSIS — I10 HYPERTENSION, UNSPECIFIED TYPE: ICD-10-CM

## 2025-03-19 LAB
OHS QRS DURATION: 74 MS
OHS QTC CALCULATION: 428 MS

## 2025-03-19 PROCEDURE — 3044F HG A1C LEVEL LT 7.0%: CPT | Mod: CPTII,S$GLB,, | Performed by: NURSE PRACTITIONER

## 2025-03-19 PROCEDURE — 99214 OFFICE O/P EST MOD 30 MIN: CPT | Mod: S$GLB,,, | Performed by: NURSE PRACTITIONER

## 2025-03-19 PROCEDURE — 3077F SYST BP >= 140 MM HG: CPT | Mod: CPTII,S$GLB,, | Performed by: NURSE PRACTITIONER

## 2025-03-19 PROCEDURE — 99999 PR PBB SHADOW E&M-EST. PATIENT-LVL IV: CPT | Mod: PBBFAC,,, | Performed by: NURSE PRACTITIONER

## 2025-03-19 PROCEDURE — 3008F BODY MASS INDEX DOCD: CPT | Mod: CPTII,S$GLB,, | Performed by: NURSE PRACTITIONER

## 2025-03-19 PROCEDURE — 1159F MED LIST DOCD IN RCRD: CPT | Mod: CPTII,S$GLB,, | Performed by: NURSE PRACTITIONER

## 2025-03-19 PROCEDURE — 93010 ELECTROCARDIOGRAM REPORT: CPT | Mod: S$GLB,,, | Performed by: STUDENT IN AN ORGANIZED HEALTH CARE EDUCATION/TRAINING PROGRAM

## 2025-03-19 PROCEDURE — 3079F DIAST BP 80-89 MM HG: CPT | Mod: CPTII,S$GLB,, | Performed by: NURSE PRACTITIONER

## 2025-03-19 RX ORDER — HYDROCHLOROTHIAZIDE 12.5 MG/1
12.5 TABLET ORAL DAILY
Qty: 30 TABLET | Refills: 11 | Status: SHIPPED | OUTPATIENT
Start: 2025-03-19 | End: 2026-03-19

## 2025-03-19 NOTE — PROGRESS NOTES
"Subjective     Patient ID: Carie Estrada is a 34 y.o. female.    Chief Complaint: Hypertension    Ms. Estrada presents this morning with elevated blood pressure of 140/84.  Last evening she checked three times fifteen min apart with pressures 145-155/108-113. She also complains of left chest pressure and headaches. She admits to having a stressful job and home life.  She was prescribed Nifedipine 11/2024 by her PCP. At present she is taking the Nifedipine 60 mg daily.    Hypertension  This is a recurrent problem. The current episode started more than 1 month ago. The problem has been waxing and waning since onset. The problem is resistant. Associated symptoms include anxiety, chest pain, headaches, neck pain and PND. Pertinent negatives include no blurred vision, malaise/fatigue, orthopnea, palpitations, peripheral edema, shortness of breath or sweats. Agents associated with hypertension include NSAIDs and oral contraceptives. There are no known risk factors, obesity and stress for coronary artery disease. Risk factors for coronary artery disease include no known risk factors, obesity and stress. Past treatments include nothing. There are no compliance problems.      Review of Systems   Constitutional:  Negative for activity change and malaise/fatigue.   Eyes:  Negative for blurred vision.   Respiratory:  Negative for shortness of breath.    Cardiovascular:  Positive for chest pain and PND. Negative for palpitations and orthopnea.        Left chest "pressure"   Musculoskeletal:  Positive for neck pain.   Neurological:  Positive for headaches.   Psychiatric/Behavioral:  The patient is nervous/anxious.           Objective     Physical Exam  Constitutional:       Appearance: Normal appearance. She is normal weight.   HENT:      Head: Normocephalic.   Cardiovascular:      Rate and Rhythm: Normal rate and regular rhythm.      Pulses: Normal pulses.      Heart sounds: Normal heart sounds. No murmur " heard.  Pulmonary:      Effort: Pulmonary effort is normal.      Breath sounds: Normal breath sounds.   Chest:      Chest wall: No tenderness.   Musculoskeletal:         General: Normal range of motion.   Skin:     General: Skin is warm and dry.   Neurological:      General: No focal deficit present.      Mental Status: She is alert and oriented to person, place, and time.   Psychiatric:         Mood and Affect: Mood normal.         Behavior: Behavior normal.         Judgment: Judgment normal.          Assessment and Plan     1. Chest pressure  -evaluated complaints of chest pressure described as left sided with neck pain  -suspecting stress related as pt confirms increasing stress on the job  -referral to cardiology for the chest pressure and elevated BP  -advised the pt that if chest pressure returns to go to the ED  -     IN OFFICE EKG 12-LEAD (to Muse) (normal EKG)           2. Anxiety  -discussed the pt's lifestyle, reporting increasing stress at home and work  -educated on stress reduction, to continue follow up with psychiatry  -continue Lexapro as directed by psychiatry    3. Hypertension, unspecified type  -discussed lifestyle changes to help in reducing blood pressure such as weight loss with regular exercise, limiting fast foods, fried foods, high fat dairy, reducing portion size of meals, monitoring sodium daily sodium intake  -continue nifedipine 60 mg daily and adding a thiazide diuretic  -     hydroCHLOROthiazide 12.5 MG Tab; Take 1 tablet (12.5 mg total) by mouth once daily.  Dispense: 30 tablet; Refill: 11

## 2025-03-24 ENCOUNTER — PATIENT MESSAGE (OUTPATIENT)
Dept: INTERNAL MEDICINE | Facility: CLINIC | Age: 35
End: 2025-03-24
Payer: COMMERCIAL

## 2025-04-29 ENCOUNTER — E-VISIT (OUTPATIENT)
Dept: OBSTETRICS AND GYNECOLOGY | Facility: CLINIC | Age: 35
End: 2025-04-29
Payer: COMMERCIAL

## 2025-04-29 DIAGNOSIS — R30.0 DYSURIA: Primary | ICD-10-CM

## 2025-04-30 RX ORDER — NITROFURANTOIN 25; 75 MG/1; MG/1
100 CAPSULE ORAL 2 TIMES DAILY
Qty: 10 CAPSULE | Refills: 0 | Status: SHIPPED | OUTPATIENT
Start: 2025-04-30 | End: 2025-05-05

## 2025-04-30 NOTE — PROGRESS NOTES
Patient ID: Carie Estrada is a 34 y.o. female.    Chief Complaint: General Illness (Entered automatically based on patient selection in [employer].)    The patient initiated a request through [employer] on 4/29/2025 for evaluation and management with a chief complaint of General Illness (Entered automatically based on patient selection in [employer].)     I evaluated the questionnaire submission on 4/30/25.    [facility]    4/29/2025  9:53 PM CDT - Filed by Patient   What do you need help with? Urinary Problems   Do you agree to participate in an E-Visit? Yes   If you have any of the following symptoms, please go to the nearest emergency room or call 911: I acknowledge   Do you have any of the following pregnancy-related conditions? None   What is the main issue you would like addressed today? Possible UTI   Do you have any of the following symptoms? Discomfort or pain passing urine;  Passing urine more frequently;  Cloudy urine   When did your concern begin? 4/29/2025   What medications or treatments have you used to help your symptoms? Cranberry products;  Drinking more fluids;  Painkillers   What does your urine look like? Cloudy   Have you had any of the following symptoms during the past 24 hours?   Urgency (a sudden and uncontrollable urge to urinate) Moderate   Frequency (going to the toilet very often) Moderate   Burning pain when urinating Mild   Incomplete bladder emptying (still feel like you need to urinate again after urination) (None, Mild, Moderate, Severe) Moderate   Pain in the lower belly when youre not urinating. Moderate   Discomfort from your urinary symptoms. Moderate   Have you had any of the following symptoms during the past 24 hours?   Blood seen in the urine Mild   Pain in the lower back on one or both sides (flank pain) None   Abnormal Vaginal Discharge (abnormal amount, color and/or odor) None   Discharge from the opening you urinate from (urethra) when not  urinating. None   Have your symptoms interfered with your every day activities? Mild   Do you have a fever? No   Are you a diabetic? No   Are you currently experiencing any of the following while completing this questionnaire? None   Do you have a history of kidney stones? No   Provide any additional information you feel is important.    Please attach any relevant images or files (if you have performed a home test for UTI, please submit a photo of results)    Are you able to take your vital signs? No         Encounter Diagnosis   Name Primary?    Dysuria Yes        Orders Placed This Encounter   Procedures    Urine Culture High Risk           Standing Status:   Future     Expected Date:   2025     Expiration Date:   2026     Send normal result to authorizing provider's In Basket if patient is active on MyChart::   Yes      Medications Ordered This Encounter   Medications    nitrofurantoin, macrocrystal-monohydrate, (MACROBID) 100 MG capsule     Sig: Take 1 capsule (100 mg total) by mouth 2 (two) times daily. for 5 days     Dispense:  10 capsule     Refill:  0        Follow up in about 1 week (around 2025), or if symptoms worsen or fail to improve.      E-Visit Time Trackin minutes

## 2025-05-06 ENCOUNTER — TELEPHONE (OUTPATIENT)
Dept: BARIATRICS | Facility: CLINIC | Age: 35
End: 2025-05-06
Payer: COMMERCIAL

## 2025-05-06 ENCOUNTER — DOCUMENTATION ONLY (OUTPATIENT)
Dept: BARIATRICS | Facility: CLINIC | Age: 35
End: 2025-05-06
Payer: COMMERCIAL

## 2025-05-06 NOTE — PROGRESS NOTES
Patient is on the financial work queue list. Message sent to Victorina Cage patient access Navigator to call patient to enroll in [facility] wait list.

## 2025-05-06 NOTE — TELEPHONE ENCOUNTER
----- Message from GEM Victoria sent at 5/6/2025 10:33 AM CDT -----  Please verify meets criteria and call pt to enroll on [facility] wait list

## 2025-05-07 ENCOUNTER — TELEPHONE (OUTPATIENT)
Dept: BARIATRICS | Facility: CLINIC | Age: 35
End: 2025-05-07
Payer: COMMERCIAL

## 2025-05-14 ENCOUNTER — TELEPHONE (OUTPATIENT)
Dept: BARIATRICS | Facility: CLINIC | Age: 35
End: 2025-05-14
Payer: COMMERCIAL

## 2025-06-25 ENCOUNTER — PATIENT MESSAGE (OUTPATIENT)
Dept: ADMINISTRATIVE | Facility: HOSPITAL | Age: 35
End: 2025-06-25
Payer: COMMERCIAL

## 2025-07-31 ENCOUNTER — PATIENT MESSAGE (OUTPATIENT)
Dept: OBSTETRICS AND GYNECOLOGY | Facility: CLINIC | Age: 35
End: 2025-07-31
Payer: COMMERCIAL

## 2025-09-04 RX ORDER — NIFEDIPINE 60 MG/1
60 TABLET, EXTENDED RELEASE ORAL
Qty: 90 TABLET | Refills: 0 | Status: SHIPPED | OUTPATIENT
Start: 2025-09-04